# Patient Record
Sex: MALE | Race: WHITE | NOT HISPANIC OR LATINO | Employment: FULL TIME | RURAL
[De-identification: names, ages, dates, MRNs, and addresses within clinical notes are randomized per-mention and may not be internally consistent; named-entity substitution may affect disease eponyms.]

---

## 2017-06-09 RX ORDER — LISINOPRIL 40 MG/1
TABLET ORAL
Qty: 90 TABLET | Refills: 1 | Status: SHIPPED | OUTPATIENT
Start: 2017-06-09 | End: 2017-09-05 | Stop reason: SDUPTHER

## 2017-07-11 ENCOUNTER — OFFICE VISIT (OUTPATIENT)
Dept: FAMILY MEDICINE CLINIC | Facility: CLINIC | Age: 57
End: 2017-07-11

## 2017-07-11 VITALS
HEIGHT: 72 IN | RESPIRATION RATE: 16 BRPM | WEIGHT: 245 LBS | OXYGEN SATURATION: 97 % | SYSTOLIC BLOOD PRESSURE: 132 MMHG | HEART RATE: 72 BPM | BODY MASS INDEX: 33.18 KG/M2 | DIASTOLIC BLOOD PRESSURE: 86 MMHG

## 2017-07-11 DIAGNOSIS — Z00.00 WELLNESS EXAMINATION: Primary | ICD-10-CM

## 2017-07-11 PROCEDURE — 99396 PREV VISIT EST AGE 40-64: CPT | Performed by: FAMILY MEDICINE

## 2017-07-11 NOTE — PROGRESS NOTES
"Jade Bradley is a 57 y.o. male.     Chief Complaint   Patient presents with   • Annual Exam     physical for [employer]       History of Present Illness     see form--deniesn any cp, head, syncope or hypoglycemia      Current Outpatient Prescriptions:   •  lansoprazole (PREVACID) 30 MG capsule, Take 1 capsule by mouth Daily., Disp: 90 capsule, Rfl: 1  •  lisinopril (PRINIVIL,ZESTRIL) 40 MG tablet, TAKE 1 TABLET DAILY, Disp: 90 tablet, Rfl: 1  •  metFORMIN (GLUCOPHAGE) 500 MG tablet, Take 1 tablet by mouth 2 (Two) Times a Day With Meals. Depends on blood sugar readings, Disp: 180 tablet, Rfl: 1  •  ONE TOUCH ULTRA TEST test strip, 1 each by Other route 2 (Two) Times a Day. Use as instructed, Disp: 3 each, Rfl: 2  •  ONETOUCH DELICA LANCETS FINE misc, TEST TWICE DAILY, Disp: , Rfl: 5  Allergies   Allergen Reactions   • Penicillins        Past Medical History:   Diagnosis Date   • Diabetes mellitus    • Diverticula of colon    • GERD (gastroesophageal reflux disease)    • Hypertension      Past Surgical History:   Procedure Laterality Date   • COLONOSCOPY  11/01/2010    snare 70, 60, rectum tics recall 5 years   • COLONOSCOPY Left 11/3/2016    Procedure: COLONOSCOPY WITH ANESTHESIA;  Surgeon: Lisandro Holt MD;  Location: [facility] ENDOSCOPY;  Service:    • HEMORRHOIDECTOMY     • HERNIA REPAIR     • TONSILLECTOMY         Review of Systems   Constitutional: Negative.    HENT: Negative.    Eyes: Negative.    Respiratory: Negative.    Cardiovascular: Negative.    Gastrointestinal: Negative.    Endocrine: Negative.    Genitourinary: Negative.    Musculoskeletal: Negative.    Skin: Negative.    Allergic/Immunologic: Negative.    Neurological: Negative.    Hematological: Negative.    Psychiatric/Behavioral: Negative.        Objective  /86  Pulse 72  Resp 16  Ht 72\" (182.9 cm)  Wt 245 lb (111 kg)  SpO2 97%  BMI 33.23 kg/m2  Physical Exam   Constitutional: He is oriented to person, place, and time. " He appears well-developed and well-nourished.   HENT:   Head: Normocephalic and atraumatic.   Right Ear: External ear normal.   Left Ear: External ear normal.   Nose: Nose normal.   Mouth/Throat: Oropharynx is clear and moist.   Eyes: Conjunctivae and EOM are normal. Pupils are equal, round, and reactive to light.   Neck: Normal range of motion. Neck supple.   Cardiovascular: Normal rate, regular rhythm, normal heart sounds and intact distal pulses.    Pulmonary/Chest: Effort normal and breath sounds normal.   Abdominal: Soft. Bowel sounds are normal.   Musculoskeletal: Normal range of motion.   Neurological: He is alert and oriented to person, place, and time. He has normal reflexes.   Skin: Skin is warm and dry.   Psychiatric: He has a normal mood and affect. His behavior is normal. Judgment and thought content normal.   Nursing note and vitals reviewed.      Assessment/Plan   Ashley was seen today for annual exam.    Diagnoses and all orders for this visit:    Wellness examination    ([employer] physical)  See form             No orders of the defined types were placed in this encounter.      Follow up: next appt

## 2017-09-05 RX ORDER — LISINOPRIL 40 MG/1
40 TABLET ORAL DAILY
Qty: 90 TABLET | Refills: 1 | Status: SHIPPED | OUTPATIENT
Start: 2017-09-05 | End: 2018-02-06 | Stop reason: SDUPTHER

## 2017-09-29 ENCOUNTER — LAB (OUTPATIENT)
Dept: FAMILY MEDICINE CLINIC | Facility: CLINIC | Age: 57
End: 2017-09-29

## 2017-09-29 ENCOUNTER — DOCUMENTATION (OUTPATIENT)
Dept: FAMILY MEDICINE CLINIC | Facility: CLINIC | Age: 57
End: 2017-09-29

## 2017-09-29 DIAGNOSIS — E13.9 DIABETES MELLITUS OF OTHER TYPE WITHOUT COMPLICATION: Primary | ICD-10-CM

## 2017-09-29 LAB — HBA1C MFR BLD: 6.2 %

## 2018-02-06 ENCOUNTER — OFFICE VISIT (OUTPATIENT)
Dept: FAMILY MEDICINE CLINIC | Facility: CLINIC | Age: 58
End: 2018-02-06

## 2018-02-06 VITALS
HEIGHT: 72 IN | WEIGHT: 243 LBS | SYSTOLIC BLOOD PRESSURE: 142 MMHG | TEMPERATURE: 98.5 F | RESPIRATION RATE: 16 BRPM | OXYGEN SATURATION: 95 % | HEART RATE: 101 BPM | BODY MASS INDEX: 32.91 KG/M2 | DIASTOLIC BLOOD PRESSURE: 78 MMHG

## 2018-02-06 DIAGNOSIS — G89.29 CHRONIC MIDLINE LOW BACK PAIN WITHOUT SCIATICA: ICD-10-CM

## 2018-02-06 DIAGNOSIS — M54.50 CHRONIC MIDLINE LOW BACK PAIN WITHOUT SCIATICA: ICD-10-CM

## 2018-02-06 DIAGNOSIS — E11.9 DM TYPE 2, GOAL A1C TO BE DETERMINED (HCC): ICD-10-CM

## 2018-02-06 DIAGNOSIS — L85.3 DRY SKIN DERMATITIS: ICD-10-CM

## 2018-02-06 DIAGNOSIS — I10 ESSENTIAL HYPERTENSION: Primary | ICD-10-CM

## 2018-02-06 DIAGNOSIS — Z12.5 SCREENING FOR PROSTATE CANCER: ICD-10-CM

## 2018-02-06 PROCEDURE — 99214 OFFICE O/P EST MOD 30 MIN: CPT | Performed by: FAMILY MEDICINE

## 2018-02-06 RX ORDER — LISINOPRIL 40 MG/1
40 TABLET ORAL DAILY
Qty: 90 TABLET | Refills: 1 | Status: SHIPPED | OUTPATIENT
Start: 2018-02-06 | End: 2018-07-31 | Stop reason: SDUPTHER

## 2018-02-06 NOTE — PROGRESS NOTES
"Subjective   Ashley Bradley is a 57 y.o. male.     Chief Complaint   Patient presents with   • Follow-up     back pain/leg rash        History of Present Illness     notes having pain in his lower back  and fingers--he occ monitors his bs--his bp remains stable ..occ rash on left leg      Current Outpatient Prescriptions:   •  fluocinonide-emollient (LIDEX-E) 0.05 % cream, Apply  topically Daily. Use up to  7days, Disp: 30 g, Rfl: 0  •  lisinopril (PRINIVIL,ZESTRIL) 40 MG tablet, Take 1 tablet by mouth Daily., Disp: 90 tablet, Rfl: 1  •  metFORMIN (GLUCOPHAGE) 500 MG tablet, Take 1 tablet by mouth 2 (Two) Times a Day With Meals. Depends on blood sugar readings, Disp: 180 tablet, Rfl: 1  •  ONE TOUCH ULTRA TEST test strip, Test 2 times daily as directed, Disp: 3 each, Rfl: 2  •  ONETOUCH DELICA LANCETS FINE misc, TEST TWICE DAILY, Disp: , Rfl: 5  Allergies   Allergen Reactions   • Penicillins        Past Medical History:   Diagnosis Date   • Diabetes mellitus    • Diverticula of colon    • GERD (gastroesophageal reflux disease)    • Hypertension      Past Surgical History:   Procedure Laterality Date   • COLONOSCOPY  11/01/2010    snare 70, 60, rectum tics recall 5 years   • COLONOSCOPY Left 11/3/2016    Procedure: COLONOSCOPY WITH ANESTHESIA;  Surgeon: Lisandro Holt MD;  Location: [facility] ENDOSCOPY;  Service:    • HEMORRHOIDECTOMY     • HERNIA REPAIR     • TONSILLECTOMY         Review of Systems   Constitutional: Negative.    HENT: Negative.    Eyes: Negative.    Respiratory: Negative.    Cardiovascular: Negative.    Gastrointestinal: Negative.    Endocrine: Negative.    Genitourinary: Negative.    Musculoskeletal: Positive for back pain.   Skin: Positive for rash.   Allergic/Immunologic: Negative.    Neurological: Negative.    Hematological: Negative.    Psychiatric/Behavioral: Negative.        Objective  /78  Pulse 101  Temp 98.5 °F (36.9 °C)  Resp 16  Ht 182.9 cm (72\")  Wt 110 kg (243 lb)  SpO2 95% "  BMI 32.96 kg/m2  Physical Exam   Constitutional: He is oriented to person, place, and time. He appears well-developed and well-nourished.   HENT:   Head: Normocephalic and atraumatic.   Right Ear: External ear normal.   Left Ear: External ear normal.   Nose: Nose normal.   Mouth/Throat: Oropharynx is clear and moist.   Eyes: Conjunctivae and EOM are normal. Pupils are equal, round, and reactive to light.   Neck: Normal range of motion. Neck supple.   Cardiovascular: Normal rate, regular rhythm, normal heart sounds and intact distal pulses.    Pulmonary/Chest: Effort normal and breath sounds normal.   Abdominal: Soft. Bowel sounds are normal.   Musculoskeletal: Normal range of motion.    Ashley had a diabetic foot exam performed today.   During the foot exam he had a monofilament test not performed.    Neurological Sensory Findings - Unaltered hot/cold right ankle/foot discrimination and unaltered hot/cold left ankle/foot discrimination. Unaltered sharp/dull right ankle/foot discrimination and unaltered sharp/dull left ankle/foot discrimination. No right ankle/foot altered proprioception and no left ankle/foot altered proprioception    Vascular Status -  His exam exhibits right foot vasculature normal. His exam exhibits no right foot edema. His exam exhibits left foot vasculature normal. His exam exhibits no left foot edema.   Skin Integrity  -  His right foot skin is intact.     Ashley 's left foot skin is intact. .  Neurological: He is alert and oriented to person, place, and time. He has normal reflexes.   Skin: Skin is warm and dry. Rash noted.   Psychiatric: He has a normal mood and affect. His behavior is normal. Judgment and thought content normal.   Nursing note and vitals reviewed.      Assessment/Plan   Ashley was seen today for follow-up.    Diagnoses and all orders for this visit:    Essential hypertension  -     CBC w AUTO Differential  -     Comprehensive Metabolic Panel  -     Lipid Panel    DM type  2, goal A1C to be determined  -     Hemoglobin A1c  -     MicroAlbumin, Urine, Random - Urine, Clean Catch    Chronic midline low back pain without sciatica  -     Ambulatory Referral to Rheumatology    Dry skin dermatitis    Screening for prostate cancer  -     PSA Screen    Other orders  -     metFORMIN (GLUCOPHAGE) 500 MG tablet; Take 1 tablet by mouth 2 (Two) Times a Day With Meals. Depends on blood sugar readings  -     lisinopril (PRINIVIL,ZESTRIL) 40 MG tablet; Take 1 tablet by mouth Daily.  -     fluocinonide-emollient (LIDEX-E) 0.05 % cream; Apply  topically Daily. Use up to  7days                 Orders Placed This Encounter   Procedures   • Comprehensive Metabolic Panel   • Lipid Panel   • Hemoglobin A1c   • MicroAlbumin, Urine, Random - Urine, Clean Catch   • PSA Screen   • Ambulatory Referral to Rheumatology     Referral Priority:   Routine     Referral Type:   Consultation     Referral Reason:   Specialty Services Required     Referred to Provider:   Arnav Rodas MD     Requested Specialty:   Rheumatology     Number of Visits Requested:   1   • CBC w AUTO Differential     Order Specific Question:   Manual Differential     Answer:   No       Follow up: 6 month(s)

## 2018-02-07 LAB
ALBUMIN SERPL-MCNC: 4.7 G/DL (ref 3.5–5)
ALBUMIN/GLOB SERPL: 1.7 G/DL (ref 1.1–2.5)
ALP SERPL-CCNC: 144 U/L (ref 24–120)
ALT SERPL-CCNC: 79 U/L (ref 0–54)
AST SERPL-CCNC: 42 U/L (ref 7–45)
BASOPHILS # BLD AUTO: 0.17 10*3/MM3 (ref 0–0.2)
BASOPHILS NFR BLD AUTO: 1.4 % (ref 0–2)
BILIRUB SERPL-MCNC: 0.6 MG/DL (ref 0.1–1)
BUN SERPL-MCNC: 12 MG/DL (ref 5–21)
BUN/CREAT SERPL: 14.1 (ref 7–25)
CALCIUM SERPL-MCNC: 10.6 MG/DL (ref 8.4–10.4)
CHLORIDE SERPL-SCNC: 95 MMOL/L (ref 98–110)
CHOLEST SERPL-MCNC: 195 MG/DL (ref 130–200)
CO2 SERPL-SCNC: 26 MMOL/L (ref 24–31)
CREAT SERPL-MCNC: 0.85 MG/DL (ref 0.5–1.4)
EOSINOPHIL # BLD AUTO: 0.2 10*3/MM3 (ref 0–0.7)
EOSINOPHIL NFR BLD AUTO: 1.6 % (ref 0–4)
ERYTHROCYTE [DISTWIDTH] IN BLOOD BY AUTOMATED COUNT: 12 % (ref 12–15)
GFR SERPLBLD CREATININE-BSD FMLA CKD-EPI: 112 ML/MIN/1.73
GFR SERPLBLD CREATININE-BSD FMLA CKD-EPI: 93 ML/MIN/1.73
GLOBULIN SER CALC-MCNC: 2.8 GM/DL
GLUCOSE SERPL-MCNC: 216 MG/DL (ref 70–100)
HBA1C MFR BLD: 7.9 %
HCT VFR BLD AUTO: 51.6 % (ref 40–52)
HDLC SERPL-MCNC: 34 MG/DL
HGB BLD-MCNC: 16.9 G/DL (ref 14–18)
IMM GRANULOCYTES # BLD: 0.13 10*3/MM3 (ref 0–0.03)
IMM GRANULOCYTES NFR BLD: 1 % (ref 0–5)
LDLC SERPL CALC-MCNC: 110 MG/DL (ref 0–99)
LYMPHOCYTES # BLD AUTO: 3.53 10*3/MM3 (ref 0.72–4.86)
LYMPHOCYTES NFR BLD AUTO: 28.3 % (ref 15–45)
MCH RBC QN AUTO: 31.5 PG (ref 28–32)
MCHC RBC AUTO-ENTMCNC: 32.8 G/DL (ref 33–36)
MCV RBC AUTO: 96.3 FL (ref 82–95)
MICROALBUMIN UR-MCNC: 165.1 UG/ML
MONOCYTES # BLD AUTO: 0.89 10*3/MM3 (ref 0.19–1.3)
MONOCYTES NFR BLD AUTO: 7.1 % (ref 4–12)
NEUTROPHILS # BLD AUTO: 7.55 10*3/MM3 (ref 1.87–8.4)
NEUTROPHILS NFR BLD AUTO: 60.6 % (ref 39–78)
NRBC BLD AUTO-RTO: 0 /100 WBC (ref 0–0)
PLATELET # BLD AUTO: 299 10*3/MM3 (ref 130–400)
POTASSIUM SERPL-SCNC: 5.1 MMOL/L (ref 3.5–5.3)
PROT SERPL-MCNC: 7.5 G/DL (ref 6.3–8.7)
PSA SERPL-MCNC: 1.7 NG/ML (ref 0–4)
RBC # BLD AUTO: 5.36 10*6/MM3 (ref 4.8–5.9)
SODIUM SERPL-SCNC: 137 MMOL/L (ref 135–145)
TRIGL SERPL-MCNC: 255 MG/DL (ref 0–149)
VLDLC SERPL CALC-MCNC: 51 MG/DL
WBC # BLD AUTO: 12.47 10*3/MM3 (ref 4.8–10.8)

## 2018-02-13 ENCOUNTER — OFFICE VISIT (OUTPATIENT)
Dept: FAMILY MEDICINE CLINIC | Facility: CLINIC | Age: 58
End: 2018-02-13

## 2018-02-13 VITALS
OXYGEN SATURATION: 97 % | HEIGHT: 72 IN | WEIGHT: 243 LBS | RESPIRATION RATE: 16 BRPM | HEART RATE: 90 BPM | SYSTOLIC BLOOD PRESSURE: 124 MMHG | DIASTOLIC BLOOD PRESSURE: 82 MMHG | BODY MASS INDEX: 32.91 KG/M2

## 2018-02-13 DIAGNOSIS — E11.9 TYPE 2 DIABETES MELLITUS WITHOUT COMPLICATION, WITHOUT LONG-TERM CURRENT USE OF INSULIN (HCC): Primary | ICD-10-CM

## 2018-02-13 PROCEDURE — 99213 OFFICE O/P EST LOW 20 MIN: CPT | Performed by: FAMILY MEDICINE

## 2018-02-13 RX ORDER — METFORMIN HYDROCHLORIDE EXTENDED-RELEASE TABLETS 1000 MG/1
1000 TABLET, FILM COATED, EXTENDED RELEASE ORAL
Qty: 30 TABLET | Refills: 2 | Status: SHIPPED | OUTPATIENT
Start: 2018-02-13 | End: 2018-04-18 | Stop reason: SDUPTHER

## 2018-02-13 NOTE — PROGRESS NOTES
"Subjective   Ashley Bradley is a 57 y.o. male.     Chief Complaint   Patient presents with   • Follow-up     labs        History of Present Illness     he reveals he has been out of metfromin for about a month--we note his recent a1c...denies any hypoglycemia      Current Outpatient Prescriptions:   •  fluocinonide-emollient (LIDEX-E) 0.05 % cream, Apply  topically Daily. Use up to  7days, Disp: 30 g, Rfl: 0  •  lisinopril (PRINIVIL,ZESTRIL) 40 MG tablet, Take 1 tablet by mouth Daily., Disp: 90 tablet, Rfl: 1  •  metFORMIN (FORTAMET) 1000 MG (OSM) 24 hr tablet, Take 1 tablet by mouth Daily With Breakfast., Disp: 30 tablet, Rfl: 2  •  metFORMIN (GLUCOPHAGE) 500 MG tablet, Take 1 tablet by mouth 2 (Two) Times a Day With Meals. Depends on blood sugar readings, Disp: 180 tablet, Rfl: 1  •  ONE TOUCH ULTRA TEST test strip, Test 2 times daily as directed, Disp: 3 each, Rfl: 2  •  ONETOUCH DELICA LANCETS FINE misc, TEST TWICE DAILY, Disp: , Rfl: 5  Allergies   Allergen Reactions   • Penicillins        Past Medical History:   Diagnosis Date   • Diabetes mellitus    • Diverticula of colon    • GERD (gastroesophageal reflux disease)    • Hypertension      Past Surgical History:   Procedure Laterality Date   • COLONOSCOPY  11/01/2010    snare 70, 60, rectum tics recall 5 years   • COLONOSCOPY Left 11/3/2016    Procedure: COLONOSCOPY WITH ANESTHESIA;  Surgeon: Lisandro Holt MD;  Location: [facility] ENDOSCOPY;  Service:    • HEMORRHOIDECTOMY     • HERNIA REPAIR     • TONSILLECTOMY         Review of Systems   Constitutional: Negative.    HENT: Negative.    Eyes: Negative.    Respiratory: Negative.    Cardiovascular: Negative.    Gastrointestinal: Negative.    Endocrine: Negative.        Objective  /82  Pulse 90  Resp 16  Ht 182.9 cm (72\")  Wt 110 kg (243 lb)  SpO2 97%  BMI 32.96 kg/m2  Physical Exam   Constitutional: He appears well-developed and well-nourished.   HENT:   Head: Normocephalic.   Eyes: Pupils are " equal, round, and reactive to light.   Neck: Normal range of motion.   Cardiovascular: Normal rate, regular rhythm, normal heart sounds and intact distal pulses.    Pulmonary/Chest: Effort normal and breath sounds normal.   Abdominal: Soft. Bowel sounds are normal.   Musculoskeletal: Normal range of motion.   Neurological: He is alert. He has normal reflexes.   Skin: Skin is warm.   Psychiatric: He has a normal mood and affect. His behavior is normal. Judgment and thought content normal.   Nursing note reviewed.      Assessment/Plan   Ashley was seen today for follow-up.    Diagnoses and all orders for this visit:    Type 2 diabetes mellitus without complication, without long-term current use of insulin    Other orders  -     metFORMIN (FORTAMET) 1000 MG (OSM) 24 hr tablet; Take 1 tablet by mouth Daily With Breakfast.                 No orders of the defined types were placed in this encounter.      Follow up: 3 month(s)

## 2018-02-19 ENCOUNTER — TELEPHONE (OUTPATIENT)
Dept: FAMILY MEDICINE CLINIC | Facility: CLINIC | Age: 58
End: 2018-02-19

## 2018-02-19 DIAGNOSIS — M54.50 CHRONIC MIDLINE LOW BACK PAIN WITHOUT SCIATICA: Primary | ICD-10-CM

## 2018-02-19 DIAGNOSIS — G89.29 CHRONIC MIDLINE LOW BACK PAIN WITHOUT SCIATICA: Primary | ICD-10-CM

## 2018-02-19 NOTE — TELEPHONE ENCOUNTER
Calling about a referral that was made to Dr. Rodas. She says Dr. Rodas does not see patients with low back pain unless they have some concern for inflammatory issues.

## 2018-02-19 NOTE — TELEPHONE ENCOUNTER
Pt informed Dr. Rodas does not see Pts with just low back pain. Mr Bradley says he doesn't know what to do from here. Still has the pain. Wants to know what can be done?

## 2018-02-19 NOTE — TELEPHONE ENCOUNTER
He is agreeable to see a back specialist. He is leaving Thursday on the boat.Will be gone 21 days.

## 2018-04-18 RX ORDER — METFORMIN HYDROCHLORIDE EXTENDED-RELEASE TABLETS 1000 MG/1
1000 TABLET, FILM COATED, EXTENDED RELEASE ORAL
Qty: 90 TABLET | Refills: 3 | Status: SHIPPED | OUTPATIENT
Start: 2018-04-18 | End: 2018-12-10 | Stop reason: CLARIF

## 2018-05-14 ENCOUNTER — OFFICE VISIT (OUTPATIENT)
Dept: FAMILY MEDICINE CLINIC | Facility: CLINIC | Age: 58
End: 2018-05-14

## 2018-05-14 VITALS
HEIGHT: 72 IN | WEIGHT: 239 LBS | RESPIRATION RATE: 16 BRPM | OXYGEN SATURATION: 93 % | HEART RATE: 92 BPM | SYSTOLIC BLOOD PRESSURE: 118 MMHG | DIASTOLIC BLOOD PRESSURE: 76 MMHG | BODY MASS INDEX: 32.37 KG/M2

## 2018-05-14 DIAGNOSIS — E11.9 TYPE 2 DIABETES MELLITUS WITHOUT COMPLICATION, WITHOUT LONG-TERM CURRENT USE OF INSULIN (HCC): ICD-10-CM

## 2018-05-14 DIAGNOSIS — L24.7 IRRITANT CONTACT DERMATITIS DUE TO PLANTS, EXCEPT FOOD: ICD-10-CM

## 2018-05-14 DIAGNOSIS — N18.2 CHRONIC KIDNEY DISEASE, STAGE II (MILD): ICD-10-CM

## 2018-05-14 DIAGNOSIS — E78.2 MIXED HYPERLIPIDEMIA: ICD-10-CM

## 2018-05-14 DIAGNOSIS — I10 ESSENTIAL HYPERTENSION: Primary | ICD-10-CM

## 2018-05-14 PROCEDURE — 99214 OFFICE O/P EST MOD 30 MIN: CPT | Performed by: FAMILY MEDICINE

## 2018-05-14 RX ORDER — PREDNISONE 10 MG/1
TABLET ORAL
Qty: 15 TABLET | Refills: 0 | Status: SHIPPED | OUTPATIENT
Start: 2018-05-14 | End: 2019-04-09

## 2018-05-14 RX ORDER — DICLOFENAC SODIUM 75 MG/1
TABLET, DELAYED RELEASE ORAL
COMMUNITY
Start: 2018-05-01 | End: 2019-04-09 | Stop reason: SDUPTHER

## 2018-05-14 NOTE — PROGRESS NOTES
Subjective   Ashley Bradley is a 58 y.o. male.     Chief Complaint   Patient presents with   • Follow-up     3 mo    type 2 DM   • Rash     on arms        History of Present Illness     Ashley is here for check on his medical problelms--he thinks his bs are issa cheema and bp has been stable --his is using nsaids for his back and he insists dr shields said it was ok to continue this depsite ckd --he also has been outsie and [place] and developed purutic rash aroudn the arms      Current Outpatient Prescriptions:   •  diclofenac (VOLTAREN) 75 MG EC tablet, , Disp: , Rfl:   •  lisinopril (PRINIVIL,ZESTRIL) 40 MG tablet, Take 1 tablet by mouth Daily., Disp: 90 tablet, Rfl: 1  •  metFORMIN (FORTAMET) 1000 MG (OSM) 24 hr tablet, Take 1 tablet by mouth Daily With Breakfast., Disp: 90 tablet, Rfl: 3  •  ONE TOUCH ULTRA TEST test strip, Test 2 times daily as directed, Disp: 3 each, Rfl: 2  •  ONETOUCH DELICA LANCETS FINE misc, TEST TWICE DAILY, Disp: , Rfl: 5  •  fluocinonide-emollient (LIDEX-E) 0.05 % cream, Apply  topically 3 (Three) Times a Day., Disp: 30 g, Rfl: 0  •  predniSONE (DELTASONE) 10 MG tablet, 30mg x 3 days then 20mg x 2 days then 10mg x 2 days, Disp: 15 tablet, Rfl: 0  Allergies   Allergen Reactions   • Penicillins        Past Medical History:   Diagnosis Date   • Diabetes mellitus    • Diverticula of colon    • GERD (gastroesophageal reflux disease)    • Hypertension      Past Surgical History:   Procedure Laterality Date   • COLONOSCOPY  11/01/2010    snare 70, 60, rectum tics recall 5 years   • COLONOSCOPY Left 11/3/2016    Procedure: COLONOSCOPY WITH ANESTHESIA;  Surgeon: Lisandro Holt MD;  Location: [facility] ENDOSCOPY;  Service:    • HEMORRHOIDECTOMY     • HERNIA REPAIR     • TONSILLECTOMY         Review of Systems   Constitutional: Negative.    HENT: Negative.    Eyes: Negative.    Respiratory: Negative.    Cardiovascular: Negative.    Gastrointestinal: Negative.    Endocrine: Negative.   "  Genitourinary: Negative.    Musculoskeletal: Negative.    Skin: Positive for rash.   Allergic/Immunologic: Negative.    Neurological: Negative.    Hematological: Negative.    Psychiatric/Behavioral: Negative.        Objective  /76   Pulse 92   Resp 16   Ht 182.9 cm (72\")   Wt 108 kg (239 lb)   SpO2 93%   BMI 32.41 kg/m²   Physical Exam   Constitutional: He is oriented to person, place, and time. He appears well-developed and well-nourished.   HENT:   Head: Normocephalic and atraumatic.   Right Ear: External ear normal.   Left Ear: External ear normal.   Nose: Nose normal.   Mouth/Throat: Oropharynx is clear and moist.   Eyes: Conjunctivae and EOM are normal. Pupils are equal, round, and reactive to light.   Neck: Normal range of motion. Neck supple.   Cardiovascular: Normal rate, regular rhythm, normal heart sounds and intact distal pulses.    Pulmonary/Chest: Effort normal and breath sounds normal.   Abdominal: Soft. Bowel sounds are normal.   Musculoskeletal: Normal range of motion.   Neurological: He is alert and oriented to person, place, and time. He has normal reflexes.   Skin: Skin is warm. Capillary refill takes less than 2 seconds. Rash noted.   Psychiatric: He has a normal mood and affect. His behavior is normal. Judgment and thought content normal.   Nursing note and vitals reviewed.      Assessment/Plan   Ashley was seen today for follow-up and rash.    Diagnoses and all orders for this visit:    Essential hypertension    Mixed hyperlipidemia    Type 2 diabetes mellitus without complication, without long-term current use of insulin  -     Basic metabolic panel  -     Hemoglobin A1c    Chronic kidney disease, stage II (mild)    Irritant contact dermatitis due to plants, except food    Other orders  -     predniSONE (DELTASONE) 10 MG tablet; 30mg x 3 days then 20mg x 2 days then 10mg x 2 days  -     fluocinonide-emollient (LIDEX-E) 0.05 % cream; Apply  topically 3 (Three) Times a " Day.                 Orders Placed This Encounter   Procedures   • Basic metabolic panel   • Hemoglobin A1c       Follow up: 4 month(s)

## 2018-05-15 LAB
BUN SERPL-MCNC: 18 MG/DL (ref 5–21)
BUN/CREAT SERPL: 19.8 (ref 7–25)
CALCIUM SERPL-MCNC: 10 MG/DL (ref 8.4–10.4)
CHLORIDE SERPL-SCNC: 100 MMOL/L (ref 98–110)
CO2 SERPL-SCNC: 27 MMOL/L (ref 24–31)
CREAT SERPL-MCNC: 0.91 MG/DL (ref 0.5–1.4)
GFR SERPLBLD CREATININE-BSD FMLA CKD-EPI: 103 ML/MIN/1.73
GFR SERPLBLD CREATININE-BSD FMLA CKD-EPI: 85 ML/MIN/1.73
GLUCOSE SERPL-MCNC: 92 MG/DL (ref 70–100)
HBA1C MFR BLD: 7.4 %
POTASSIUM SERPL-SCNC: 4.5 MMOL/L (ref 3.5–5.3)
SODIUM SERPL-SCNC: 140 MMOL/L (ref 135–145)

## 2018-06-19 ENCOUNTER — TELEPHONE (OUTPATIENT)
Dept: FAMILY MEDICINE CLINIC | Facility: CLINIC | Age: 58
End: 2018-06-19

## 2018-06-19 NOTE — TELEPHONE ENCOUNTER
Pt called he went to [facility] and he said that he looks like a lobster he asked if u will call in silvadene cream [facility] [phone]

## 2018-07-31 RX ORDER — LISINOPRIL 40 MG/1
40 TABLET ORAL DAILY
Qty: 90 TABLET | Refills: 1 | Status: SHIPPED | OUTPATIENT
Start: 2018-07-31 | End: 2019-01-28 | Stop reason: SDUPTHER

## 2018-07-31 RX ORDER — LISINOPRIL 40 MG/1
40 TABLET ORAL DAILY
Qty: 90 TABLET | Refills: 1 | Status: SHIPPED | OUTPATIENT
Start: 2018-07-31 | End: 2018-07-31 | Stop reason: SDUPTHER

## 2018-12-10 ENCOUNTER — TELEPHONE (OUTPATIENT)
Dept: FAMILY MEDICINE CLINIC | Facility: CLINIC | Age: 58
End: 2018-12-10

## 2018-12-10 RX ORDER — METFORMIN HYDROCHLORIDE 500 MG/1
TABLET, EXTENDED RELEASE ORAL
Qty: 180 TABLET | Refills: 1 | Status: SHIPPED | OUTPATIENT
Start: 2018-12-10 | End: 2019-04-09 | Stop reason: SDUPTHER

## 2018-12-10 NOTE — TELEPHONE ENCOUNTER
Needs his Metformin switched to glucophage for insurance. He is presently taking Metformin 1000mg extended release. Glucophage ER only comes 500 or750. Can he switch to the regular release or he will have to take 2 tabs of the 500mg ER

## 2018-12-18 ENCOUNTER — TRANSCRIBE ORDERS (OUTPATIENT)
Dept: ADMINISTRATIVE | Facility: HOSPITAL | Age: 58
End: 2018-12-18

## 2018-12-18 DIAGNOSIS — M54.10 RADICULITIS: Primary | ICD-10-CM

## 2018-12-19 ENCOUNTER — HOSPITAL ENCOUNTER (OUTPATIENT)
Dept: MRI IMAGING | Facility: HOSPITAL | Age: 58
Discharge: HOME | End: 2018-12-19
Admitting: NURSE PRACTITIONER

## 2018-12-19 DIAGNOSIS — M54.10 RADICULITIS: ICD-10-CM

## 2018-12-19 PROCEDURE — 72148 MRI LUMBAR SPINE W/O DYE: CPT

## 2019-01-28 RX ORDER — LISINOPRIL 40 MG/1
40 TABLET ORAL DAILY
Qty: 30 TABLET | Refills: 0 | Status: SHIPPED | OUTPATIENT
Start: 2019-01-28 | End: 2019-04-04

## 2019-03-15 ENCOUNTER — TELEPHONE (OUTPATIENT)
Dept: FAMILY MEDICINE CLINIC | Facility: CLINIC | Age: 59
End: 2019-03-15

## 2019-03-15 NOTE — TELEPHONE ENCOUNTER
margaret I got a message from vidya rn pharm md since pt is over 40 yrs old and a diabetic it is recommended that he be on a statin drug and she stated that he has dx of hyperlipidemia. [phone] ref # [id number]

## 2019-04-04 ENCOUNTER — TELEPHONE (OUTPATIENT)
Dept: FAMILY MEDICINE CLINIC | Facility: CLINIC | Age: 59
End: 2019-04-04

## 2019-04-04 RX ORDER — LISINOPRIL 40 MG/1
40 TABLET ORAL DAILY
Qty: 90 TABLET | Refills: 0 | Status: SHIPPED | OUTPATIENT
Start: 2019-04-04 | End: 2019-04-09 | Stop reason: SDUPTHER

## 2019-04-04 NOTE — TELEPHONE ENCOUNTER
I spoke to the patient regarding his lisinopril refill.  He said that his insurance would only fill/pay for 90 days at a time.  I informed him that he had not been seen by Dr. Rankin since 5/2018 and he would need to have an office visit to get further refills.  He made an appt for 4-9-19 at 1615 and knows that if he does not keep this appt he will not get further refills.

## 2019-04-09 ENCOUNTER — OFFICE VISIT (OUTPATIENT)
Dept: FAMILY MEDICINE CLINIC | Facility: CLINIC | Age: 59
End: 2019-04-09

## 2019-04-09 VITALS
WEIGHT: 239 LBS | HEART RATE: 85 BPM | BODY MASS INDEX: 32.37 KG/M2 | DIASTOLIC BLOOD PRESSURE: 86 MMHG | OXYGEN SATURATION: 96 % | HEIGHT: 72 IN | SYSTOLIC BLOOD PRESSURE: 128 MMHG | RESPIRATION RATE: 16 BRPM

## 2019-04-09 DIAGNOSIS — E11.9 TYPE 2 DIABETES MELLITUS WITHOUT COMPLICATION, WITHOUT LONG-TERM CURRENT USE OF INSULIN (HCC): ICD-10-CM

## 2019-04-09 DIAGNOSIS — I10 ESSENTIAL HYPERTENSION: Primary | ICD-10-CM

## 2019-04-09 PROCEDURE — 99213 OFFICE O/P EST LOW 20 MIN: CPT | Performed by: FAMILY MEDICINE

## 2019-04-09 RX ORDER — METFORMIN HYDROCHLORIDE 500 MG/1
TABLET, EXTENDED RELEASE ORAL
Qty: 180 TABLET | Refills: 3 | Status: SHIPPED | OUTPATIENT
Start: 2019-04-09 | End: 2020-05-01

## 2019-04-09 RX ORDER — DICLOFENAC SODIUM 75 MG/1
75 TABLET, DELAYED RELEASE ORAL 2 TIMES DAILY
Qty: 180 TABLET | Refills: 3 | Status: SHIPPED | OUTPATIENT
Start: 2019-04-09 | End: 2020-05-01

## 2019-04-09 RX ORDER — LISINOPRIL 40 MG/1
40 TABLET ORAL DAILY
Qty: 90 TABLET | Refills: 3 | Status: SHIPPED | OUTPATIENT
Start: 2019-04-09 | End: 2020-05-01

## 2019-04-09 NOTE — PROGRESS NOTES
"Subjective   Ashley Bradley is a 59 y.o. male.     Chief Complaint   Patient presents with   • Annual Exam     check up & med refill        History of Present Illness     he thinks his bs are stable --he notes his bp is stable wituout cp or ha      Current Outpatient Medications:   •  diclofenac (VOLTAREN) 75 MG EC tablet, , Disp: , Rfl:   •  lisinopril (PRINIVIL,ZESTRIL) 40 MG tablet, Take 1 tablet by mouth Daily., Disp: 90 tablet, Rfl: 0  •  metFORMIN ER (GLUCOPHAGE XR) 500 MG 24 hr tablet, Take 2 tablets with breakfast every day, Disp: 180 tablet, Rfl: 1  •  ONE TOUCH ULTRA TEST test strip, Test 2 times daily as directed, Disp: 3 each, Rfl: 2  •  ONETOUCH DELICA LANCETS FINE misc, TEST TWICE DAILY, Disp: , Rfl: 5  Allergies   Allergen Reactions   • Penicillins        Past Medical History:   Diagnosis Date   • Diabetes mellitus (CMS/HCC)    • Diverticula of colon    • GERD (gastroesophageal reflux disease)    • Hypertension      Past Surgical History:   Procedure Laterality Date   • COLONOSCOPY  11/01/2010    snare 70, 60, rectum tics recall 5 years   • COLONOSCOPY Left 11/3/2016    Procedure: COLONOSCOPY WITH ANESTHESIA;  Surgeon: Lisandro Holt MD;  Location: [facility] ENDOSCOPY;  Service:    • HEMORRHOIDECTOMY     • HERNIA REPAIR     • TONSILLECTOMY         Review of Systems   Constitutional: Negative.    HENT: Negative.    Eyes: Negative.    Respiratory: Negative.    Cardiovascular: Negative.    Gastrointestinal: Negative.    Endocrine: Negative.    Genitourinary: Negative.    Musculoskeletal: Negative.    Skin: Negative.    Allergic/Immunologic: Negative.    Neurological: Negative.    Hematological: Negative.    Psychiatric/Behavioral: Negative.        Objective  /86   Pulse 85   Resp 16   Ht 182.9 cm (72\")   Wt 108 kg (239 lb)   SpO2 96%   BMI 32.41 kg/m²   Physical Exam   Constitutional: He is oriented to person, place, and time. He appears well-developed and well-nourished.   HENT:   Head: " Normocephalic and atraumatic.   Right Ear: External ear normal.   Left Ear: External ear normal.   Nose: Nose normal.   Mouth/Throat: Oropharynx is clear and moist.   Eyes: Conjunctivae and EOM are normal. Pupils are equal, round, and reactive to light.   Neck: Normal range of motion. Neck supple.   Cardiovascular: Normal rate, regular rhythm, normal heart sounds and intact distal pulses.   Pulmonary/Chest: Effort normal and breath sounds normal.   Abdominal: Soft. Bowel sounds are normal.   Musculoskeletal: Normal range of motion.    Ashley had a diabetic foot exam performed today.   During the foot exam he had a monofilament test performed.    Neurological Sensory Findings - Unaltered hot/cold right ankle/foot discrimination and unaltered hot/cold left ankle/foot discrimination. Unaltered sharp/dull right ankle/foot discrimination and unaltered sharp/dull left ankle/foot discrimination. No right ankle/foot altered proprioception and no left ankle/foot altered proprioception  Vascular Status -  His right foot exhibits abnormal foot vasculature . His right foot exhibits no edema. His left foot exhibits abnormal foot vasculature . His left foot exhibits no edema.  Skin Integrity  -  His right foot skin is not intact.  His left foot skin is not intact..  Neurological: He is alert and oriented to person, place, and time.   Skin: Skin is warm and dry. Capillary refill takes less than 2 seconds.   Psychiatric: He has a normal mood and affect. His behavior is normal. Judgment and thought content normal.   Nursing note and vitals reviewed.      Assessment/Plan   Ashley was seen today for annual exam.    Diagnoses and all orders for this visit:    Essential hypertension  -     PSA Screen  -     CBC w AUTO Differential  -     Comprehensive metabolic panel  -     Lipid Panel With / Chol / HDL Ratio  -     Hemoglobin A1c  -     MicroAlbumin, Urine, Random - Urine, Clean Catch    Type 2 diabetes mellitus without complication,  without long-term current use of insulin (CMS/HCC)      He says his colonoscopy is up to date           Orders Placed This Encounter   Procedures   • PSA Screen   • Comprehensive metabolic panel   • Lipid Panel With / Chol / HDL Ratio   • Hemoglobin A1c   • MicroAlbumin, Urine, Random - Urine, Clean Catch   • CBC w AUTO Differential     Order Specific Question:   Manual Differential     Answer:   No       Follow up: 6 month(s)

## 2019-04-10 LAB
ALBUMIN SERPL-MCNC: 4.2 G/DL (ref 3.5–5.2)
ALBUMIN/GLOB SERPL: 1.5 G/DL
ALP SERPL-CCNC: 103 U/L (ref 39–117)
ALT SERPL-CCNC: 48 U/L (ref 1–41)
AST SERPL-CCNC: 31 U/L (ref 1–40)
BASOPHILS # BLD AUTO: (no result) 10*3/UL
BASOPHILS # BLD MANUAL: 0.09 10*3/MM3 (ref 0–0.2)
BASOPHILS NFR BLD MANUAL: 1 % (ref 0–1.5)
BILIRUB SERPL-MCNC: 0.5 MG/DL (ref 0.2–1.2)
BUN SERPL-MCNC: 16 MG/DL (ref 8–23)
BUN/CREAT SERPL: 16.5 (ref 7–25)
CALCIUM SERPL-MCNC: 10 MG/DL (ref 8.6–10.5)
CHLORIDE SERPL-SCNC: 98 MMOL/L (ref 98–107)
CHOLEST SERPL-MCNC: 185 MG/DL (ref 0–200)
CHOLEST/HDLC SERPL: 6.61 {RATIO}
CO2 SERPL-SCNC: 24 MMOL/L (ref 22–29)
CREAT SERPL-MCNC: 0.97 MG/DL (ref 0.76–1.27)
DIFFERENTIAL COMMENT: NORMAL
EOSINOPHIL # BLD AUTO: (no result) 10*3/UL
EOSINOPHIL NFR BLD AUTO: (no result) %
ERYTHROCYTE [DISTWIDTH] IN BLOOD BY AUTOMATED COUNT: 11.9 % (ref 12.3–15.4)
GLOBULIN SER CALC-MCNC: 2.8 GM/DL
GLUCOSE SERPL-MCNC: 100 MG/DL (ref 65–99)
HBA1C MFR BLD: 8.2 % (ref 4.8–5.6)
HCT VFR BLD AUTO: 46.2 % (ref 37.5–51)
HDLC SERPL-MCNC: 28 MG/DL (ref 40–60)
HGB BLD-MCNC: 14.6 G/DL (ref 13–17.7)
LDLC SERPL CALC-MCNC: 96 MG/DL (ref 0–100)
LYMPHOCYTES # BLD AUTO: (no result) 10*3/UL
LYMPHOCYTES # BLD MANUAL: 3.09 10*3/MM3 (ref 0.7–3.1)
LYMPHOCYTES NFR BLD AUTO: (no result) %
LYMPHOCYTES NFR BLD MANUAL: 34 % (ref 19.6–45.3)
MCH RBC QN AUTO: 32.5 PG (ref 26.6–33)
MCHC RBC AUTO-ENTMCNC: 31.6 G/DL (ref 31.5–35.7)
MCV RBC AUTO: 102.9 FL (ref 79–97)
MICROALBUMIN UR-MCNC: 18.5 UG/ML
MONOCYTES # BLD MANUAL: 0.75 10*3/MM3 (ref 0.1–0.9)
MONOCYTES NFR BLD AUTO: (no result) %
MONOCYTES NFR BLD MANUAL: 8.2 % (ref 5–12)
NEUTROPHILS # BLD MANUAL: 5.06 10*3/MM3 (ref 1.4–7)
NEUTROPHILS NFR BLD AUTO: (no result) %
NEUTROPHILS NFR BLD MANUAL: 55.7 % (ref 42.7–76)
PLATELET # BLD AUTO: 219 10*3/MM3 (ref 140–450)
PLATELET BLD QL SMEAR: NORMAL
POTASSIUM SERPL-SCNC: 5.6 MMOL/L (ref 3.5–5.2)
PROT SERPL-MCNC: 7 G/DL (ref 6–8.5)
PSA SERPL-MCNC: 1.82 NG/ML (ref 0–4)
RBC # BLD AUTO: 4.49 10*6/MM3 (ref 4.14–5.8)
RBC MORPH BLD: NORMAL
SODIUM SERPL-SCNC: 138 MMOL/L (ref 136–145)
TRIGL SERPL-MCNC: 305 MG/DL (ref 0–150)
VLDLC SERPL CALC-MCNC: 61 MG/DL (ref 5–40)
WBC # BLD AUTO: 9.09 10*3/MM3 (ref 3.4–10.8)

## 2019-04-11 DIAGNOSIS — E87.5 SERUM POTASSIUM ELEVATED: Primary | ICD-10-CM

## 2019-04-11 NOTE — PROGRESS NOTES
Pt informed his dm control is not good and his K+ is high. Needs to get this repeated at one of the hospitals. He is going to P to get done. Order put in.

## 2019-04-12 ENCOUNTER — LAB (OUTPATIENT)
Dept: LAB | Facility: HOSPITAL | Age: 59
End: 2019-04-12

## 2019-04-12 DIAGNOSIS — E87.5 SERUM POTASSIUM ELEVATED: ICD-10-CM

## 2019-04-12 LAB — POTASSIUM BLD-SCNC: 5.4 MMOL/L (ref 3.5–5.3)

## 2019-04-12 PROCEDURE — 84132 ASSAY OF SERUM POTASSIUM: CPT | Performed by: FAMILY MEDICINE

## 2019-05-24 ENCOUNTER — TELEPHONE (OUTPATIENT)
Dept: FAMILY MEDICINE CLINIC | Facility: CLINIC | Age: 59
End: 2019-05-24

## 2019-05-24 NOTE — TELEPHONE ENCOUNTER
I suggest walk in ortho clinic--he will get instant reading on xray and treatment instead of having to wait for reading report on Tuesday--Monday holiday

## 2019-05-24 NOTE — TELEPHONE ENCOUNTER
Pt called said that he hurt his right leg last night he dont know if he has fx or pulled muscle but is in pain he asked for xray or do u want to see or send to ortho walk in [phone]

## 2019-11-08 ENCOUNTER — OFFICE VISIT (OUTPATIENT)
Dept: FAMILY MEDICINE CLINIC | Facility: CLINIC | Age: 59
End: 2019-11-08

## 2019-11-08 ENCOUNTER — TELEPHONE (OUTPATIENT)
Dept: FAMILY MEDICINE CLINIC | Facility: CLINIC | Age: 59
End: 2019-11-08

## 2019-11-08 VITALS
HEART RATE: 101 BPM | HEIGHT: 72 IN | WEIGHT: 234 LBS | BODY MASS INDEX: 31.69 KG/M2 | OXYGEN SATURATION: 97 % | SYSTOLIC BLOOD PRESSURE: 128 MMHG | DIASTOLIC BLOOD PRESSURE: 88 MMHG | RESPIRATION RATE: 16 BRPM

## 2019-11-08 DIAGNOSIS — I10 ESSENTIAL HYPERTENSION: Primary | ICD-10-CM

## 2019-11-08 DIAGNOSIS — E11.9 TYPE 2 DIABETES MELLITUS WITHOUT COMPLICATION, WITHOUT LONG-TERM CURRENT USE OF INSULIN (HCC): ICD-10-CM

## 2019-11-08 DIAGNOSIS — Z23 NEED FOR VACCINATION: ICD-10-CM

## 2019-11-08 PROCEDURE — 90674 CCIIV4 VAC NO PRSV 0.5 ML IM: CPT | Performed by: FAMILY MEDICINE

## 2019-11-08 PROCEDURE — 90471 IMMUNIZATION ADMIN: CPT | Performed by: FAMILY MEDICINE

## 2019-11-08 PROCEDURE — 99213 OFFICE O/P EST LOW 20 MIN: CPT | Performed by: FAMILY MEDICINE

## 2019-11-08 RX ORDER — FLASH GLUCOSE SENSOR
1 KIT MISCELLANEOUS
Qty: 1 EACH | Refills: 4 | Status: ON HOLD | OUTPATIENT
Start: 2019-11-08 | End: 2022-06-02

## 2019-11-08 RX ORDER — CLARITHROMYCIN 500 MG/1
500 TABLET, COATED ORAL EVERY 12 HOURS SCHEDULED
Qty: 20 TABLET | Refills: 0 | Status: SHIPPED | OUTPATIENT
Start: 2019-11-08 | End: 2019-11-18

## 2019-11-08 RX ORDER — FLASH GLUCOSE SCANNING READER
1 EACH MISCELLANEOUS DAILY
Qty: 1 DEVICE | Refills: 0 | Status: ON HOLD | OUTPATIENT
Start: 2019-11-08 | End: 2022-06-02

## 2019-11-08 NOTE — TELEPHONE ENCOUNTER
Ashley stopped before he left the office & said that he req med for productive cough w/ green mucus & congestion.  [facility]

## 2019-11-08 NOTE — PROGRESS NOTES
"Subjective   Ashley Bradley is a 59 y.o. male.     Chief Complaint   Patient presents with   • Follow-up     6 mo   htn        History of Present Illness     he thinks his bs are doing better---he thinks bp is better wthout cp or ha      Current Outpatient Medications:   •  diclofenac (VOLTAREN) 75 MG EC tablet, Take 1 tablet by mouth 2 (Two) Times a Day., Disp: 180 tablet, Rfl: 3  •  lisinopril (PRINIVIL,ZESTRIL) 40 MG tablet, Take 1 tablet by mouth Daily., Disp: 90 tablet, Rfl: 3  •  metFORMIN ER (GLUCOPHAGE XR) 500 MG 24 hr tablet, Take 2 tablets with breakfast every day, Disp: 180 tablet, Rfl: 3  •  ONE TOUCH ULTRA TEST test strip, Use to test blood sugar twice daily, Disp: 200 each, Rfl: 3  •  ONETOUCH DELICA LANCETS FINE misc, Use to test blood sugar twice daily, Disp: 200 each, Rfl: 3  Allergies   Allergen Reactions   • Penicillins        Past Medical History:   Diagnosis Date   • Diabetes mellitus (CMS/HCC)    • Diverticula of colon    • GERD (gastroesophageal reflux disease)    • Hypertension      Past Surgical History:   Procedure Laterality Date   • COLONOSCOPY  11/01/2010    snare 70, 60, rectum tics recall 5 years   • COLONOSCOPY Left 11/3/2016    Procedure: COLONOSCOPY WITH ANESTHESIA;  Surgeon: Lisandro Holt MD;  Location: [facility] ENDOSCOPY;  Service:    • HEMORRHOIDECTOMY     • HERNIA REPAIR     • TONSILLECTOMY         Review of Systems   Constitutional: Negative.    HENT: Negative.    Eyes: Negative.    Respiratory: Negative.    Cardiovascular: Negative.    Gastrointestinal: Negative.    Endocrine: Negative.    Genitourinary: Negative.    Musculoskeletal: Negative.    Skin: Negative.    Allergic/Immunologic: Negative.    Neurological: Negative.    Hematological: Negative.    Psychiatric/Behavioral: Negative.        Objective  /88   Pulse 101   Resp 16   Ht 182.9 cm (72\")   Wt 106 kg (234 lb)   SpO2 97%   BMI 31.74 kg/m²   Physical Exam   Constitutional: He is oriented to person, " place, and time. He appears well-developed and well-nourished.   HENT:   Head: Normocephalic and atraumatic.   Right Ear: External ear normal.   Left Ear: External ear normal.   Nose: Nose normal.   Mouth/Throat: Oropharynx is clear and moist.   Eyes: Conjunctivae and EOM are normal. Pupils are equal, round, and reactive to light.   Neck: Normal range of motion. Neck supple.   Cardiovascular: Normal rate, regular rhythm, normal heart sounds and intact distal pulses.   Pulmonary/Chest: Effort normal and breath sounds normal.   Abdominal: Soft. Bowel sounds are normal.   Musculoskeletal: Normal range of motion.   Neurological: He is alert and oriented to person, place, and time.   Skin: Skin is warm. Capillary refill takes less than 2 seconds.   Psychiatric: He has a normal mood and affect. His behavior is normal. Judgment and thought content normal.   Nursing note and vitals reviewed.      Assessment/Plan   Ashley was seen today for follow-up.    Diagnoses and all orders for this visit:    Essential hypertension  -     Comprehensive metabolic panel  -     CBC w AUTO Differential  -     Hemoglobin A1c    Type 2 diabetes mellitus without complication, without long-term current use of insulin (CMS/[facility])  -     Comprehensive metabolic panel  -     CBC w AUTO Differential  -     Hemoglobin A1c                 Orders Placed This Encounter   Procedures   • Comprehensive metabolic panel   • Hemoglobin A1c   • CBC w AUTO Differential     Order Specific Question:   Manual Differential     Answer:   No       Follow up: 4 month(s)

## 2019-11-09 LAB
ALBUMIN SERPL-MCNC: 4.8 G/DL (ref 3.5–5.2)
ALBUMIN/GLOB SERPL: 2.1 G/DL
ALP SERPL-CCNC: 100 U/L (ref 39–117)
ALT SERPL-CCNC: 46 U/L (ref 1–41)
AST SERPL-CCNC: 26 U/L (ref 1–40)
BASOPHILS # BLD AUTO: (no result) 10*3/UL
BASOPHILS # BLD MANUAL: 0.11 10*3/MM3 (ref 0–0.2)
BASOPHILS NFR BLD MANUAL: 1 % (ref 0–1.5)
BILIRUB SERPL-MCNC: 0.4 MG/DL (ref 0.2–1.2)
BUN SERPL-MCNC: 10 MG/DL (ref 8–23)
BUN/CREAT SERPL: 10.9 (ref 7–25)
CALCIUM SERPL-MCNC: 9.7 MG/DL (ref 8.6–10.5)
CHLORIDE SERPL-SCNC: 99 MMOL/L (ref 98–107)
CO2 SERPL-SCNC: 27.4 MMOL/L (ref 22–29)
CREAT SERPL-MCNC: 0.92 MG/DL (ref 0.76–1.27)
DIFFERENTIAL COMMENT: ABNORMAL
EOSINOPHIL # BLD AUTO: (no result) 10*3/UL
EOSINOPHIL NFR BLD AUTO: (no result) %
ERYTHROCYTE [DISTWIDTH] IN BLOOD BY AUTOMATED COUNT: 11.8 % (ref 12.3–15.4)
GLOBULIN SER CALC-MCNC: 2.3 GM/DL
GLUCOSE SERPL-MCNC: 112 MG/DL (ref 65–99)
HBA1C MFR BLD: 6.8 % (ref 4.8–5.6)
HCT VFR BLD AUTO: 44.5 % (ref 37.5–51)
HGB BLD-MCNC: 15.7 G/DL (ref 13–17.7)
LYMPHOCYTES # BLD AUTO: (no result) 10*3/UL
LYMPHOCYTES # BLD MANUAL: 3.44 10*3/MM3 (ref 0.7–3.1)
LYMPHOCYTES NFR BLD AUTO: (no result) %
LYMPHOCYTES NFR BLD MANUAL: 32 % (ref 19.6–45.3)
MCH RBC QN AUTO: 33.8 PG (ref 26.6–33)
MCHC RBC AUTO-ENTMCNC: 35.3 G/DL (ref 31.5–35.7)
MCV RBC AUTO: 95.7 FL (ref 79–97)
MONOCYTES # BLD MANUAL: 1.11 10*3/MM3 (ref 0.1–0.9)
MONOCYTES NFR BLD AUTO: (no result) %
MONOCYTES NFR BLD MANUAL: 10.3 % (ref 5–12)
NEUTROPHILS # BLD MANUAL: 5.99 10*3/MM3 (ref 1.7–7)
NEUTROPHILS NFR BLD AUTO: (no result) %
NEUTROPHILS NFR BLD MANUAL: 55.7 % (ref 42.7–76)
PLATELET # BLD AUTO: 206 10*3/MM3 (ref 140–450)
PLATELET BLD QL SMEAR: ABNORMAL
POTASSIUM SERPL-SCNC: 5.3 MMOL/L (ref 3.5–5.2)
PROT SERPL-MCNC: 7.1 G/DL (ref 6–8.5)
RBC # BLD AUTO: 4.65 10*6/MM3 (ref 4.14–5.8)
RBC MORPH BLD: ABNORMAL
SODIUM SERPL-SCNC: 139 MMOL/L (ref 136–145)
WBC # BLD AUTO: 10.75 10*3/MM3 (ref 3.4–10.8)

## 2019-12-30 ENCOUNTER — TELEPHONE (OUTPATIENT)
Dept: FAMILY MEDICINE CLINIC | Facility: CLINIC | Age: 59
End: 2019-12-30

## 2019-12-30 RX ORDER — BENZONATATE 200 MG/1
200 CAPSULE ORAL 3 TIMES DAILY PRN
Qty: 30 CAPSULE | Refills: 0 | Status: SHIPPED | OUTPATIENT
Start: 2019-12-30 | End: 2020-03-13

## 2019-12-30 RX ORDER — CLARITHROMYCIN 500 MG/1
500 TABLET, COATED ORAL EVERY 12 HOURS SCHEDULED
Qty: 20 TABLET | Refills: 0 | Status: SHIPPED | OUTPATIENT
Start: 2019-12-30 | End: 2020-01-09

## 2019-12-30 RX ORDER — GUAIFENESIN/DEXTROMETHORPHAN 100-10MG/5
5 SYRUP ORAL EVERY 6 HOURS PRN
Qty: 125 ML | Refills: 0 | Status: SHIPPED | OUTPATIENT
Start: 2019-12-30 | End: 2020-03-13

## 2019-12-30 NOTE — TELEPHONE ENCOUNTER
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

## 2019-12-30 NOTE — TELEPHONE ENCOUNTER
Pt called and said that he is having coughing a lot of chest congestion head congestion runny nose he is on the boat and asked if u will send in anbtc? [phone]

## 2020-01-03 ENCOUNTER — TELEPHONE (OUTPATIENT)
Dept: FAMILY MEDICINE CLINIC | Facility: CLINIC | Age: 60
End: 2020-01-03

## 2020-01-03 NOTE — TELEPHONE ENCOUNTER
Jovita breaux/ [facility] called on behalf of [facility].  She said that due to Ashley's dx of DM & elevated cholesterol as well as being over 40 y/o,  they recommend putting him on a statin.      REF # [id number]   Ph# [phone]

## 2020-03-13 ENCOUNTER — HOSPITAL ENCOUNTER (OUTPATIENT)
Dept: GENERAL RADIOLOGY | Facility: HOSPITAL | Age: 60
Discharge: HOME | End: 2020-03-13
Admitting: FAMILY MEDICINE

## 2020-03-13 ENCOUNTER — OFFICE VISIT (OUTPATIENT)
Dept: FAMILY MEDICINE CLINIC | Facility: CLINIC | Age: 60
End: 2020-03-13

## 2020-03-13 VITALS
SYSTOLIC BLOOD PRESSURE: 128 MMHG | BODY MASS INDEX: 32.1 KG/M2 | HEART RATE: 100 BPM | HEIGHT: 72 IN | RESPIRATION RATE: 16 BRPM | WEIGHT: 237 LBS | OXYGEN SATURATION: 95 % | DIASTOLIC BLOOD PRESSURE: 86 MMHG

## 2020-03-13 DIAGNOSIS — J40 BRONCHITIS: ICD-10-CM

## 2020-03-13 DIAGNOSIS — E11.9 TYPE 2 DIABETES MELLITUS WITHOUT COMPLICATION, WITHOUT LONG-TERM CURRENT USE OF INSULIN (HCC): ICD-10-CM

## 2020-03-13 DIAGNOSIS — I10 ESSENTIAL HYPERTENSION: Primary | ICD-10-CM

## 2020-03-13 DIAGNOSIS — J01.90 ACUTE SINUSITIS, RECURRENCE NOT SPECIFIED, UNSPECIFIED LOCATION: ICD-10-CM

## 2020-03-13 PROCEDURE — 71046 X-RAY EXAM CHEST 2 VIEWS: CPT

## 2020-03-13 PROCEDURE — 99213 OFFICE O/P EST LOW 20 MIN: CPT | Performed by: FAMILY MEDICINE

## 2020-03-13 RX ORDER — LEVOFLOXACIN 500 MG/1
500 TABLET, FILM COATED ORAL DAILY
Qty: 10 TABLET | Refills: 0 | Status: SHIPPED | OUTPATIENT
Start: 2020-03-13 | End: 2021-08-27

## 2020-03-13 NOTE — PROGRESS NOTES
Subjective   Ashley Bradley is a 59 y.o. male.     Chief Complaint   Patient presents with   • Follow-up     4 mo   htn   • Cough     productive cough       History of Present Illness     he thinks bs and bp are dsoing well--he is coughing up green to brown sputum without fver or hemoptysis      Current Outpatient Medications:   •  Continuous Blood Gluc  (FREESTYLE MAURI 14 DAY READER) device, 1 each Daily., Disp: 1 Device, Rfl: 0  •  Continuous Blood Gluc Sensor (FREESTYLE MAURI 14 DAY SENSOR) misc, 1 each Every 14 (Fourteen) Days., Disp: 1 each, Rfl: 4  •  diclofenac (VOLTAREN) 75 MG EC tablet, Take 1 tablet by mouth 2 (Two) Times a Day., Disp: 180 tablet, Rfl: 3  •  levoFLOXacin (LEVAQUIN) 500 MG tablet, Take 1 tablet by mouth Daily., Disp: 10 tablet, Rfl: 0  •  lisinopril (PRINIVIL,ZESTRIL) 40 MG tablet, Take 1 tablet by mouth Daily., Disp: 90 tablet, Rfl: 3  •  metFORMIN ER (GLUCOPHAGE XR) 500 MG 24 hr tablet, Take 2 tablets with breakfast every day, Disp: 180 tablet, Rfl: 3  •  ONE TOUCH ULTRA TEST test strip, Use to test blood sugar twice daily, Disp: 200 each, Rfl: 3  •  ONETOUCH DELICA LANCETS FINE misc, Use to test blood sugar twice daily, Disp: 200 each, Rfl: 3  Allergies   Allergen Reactions   • Penicillins        Past Medical History:   Diagnosis Date   • Diabetes mellitus (CMS/HCC)    • Diverticula of colon    • GERD (gastroesophageal reflux disease)    • Hypertension      Past Surgical History:   Procedure Laterality Date   • COLONOSCOPY  11/01/2010    snare 70, 59, rectum tics recall 5 years   • COLONOSCOPY Left 11/3/2016    Procedure: COLONOSCOPY WITH ANESTHESIA;  Surgeon: Lisandro Holt MD;  Location: [facility] ENDOSCOPY;  Service:    • HEMORRHOIDECTOMY     • HERNIA REPAIR     • TONSILLECTOMY         Review of Systems   Constitutional: Negative.    HENT: Negative.    Eyes: Negative.    Respiratory: Positive for cough.    Cardiovascular: Negative.    Gastrointestinal: Negative.    Endocrine:  "Negative.    Genitourinary: Negative.    Musculoskeletal: Negative.    Skin: Negative.    Allergic/Immunologic: Negative.    Neurological: Negative.    Hematological: Negative.    Psychiatric/Behavioral: Negative.        Objective  /86   Pulse 100   Resp 16   Ht 182.9 cm (72\")   Wt 108 kg (237 lb)   SpO2 95%   BMI 32.14 kg/m²   Physical Exam   Constitutional: He is oriented to person, place, and time. He appears well-developed and well-nourished.   HENT:   Head: Normocephalic and atraumatic.   Right Ear: External ear normal.   Left Ear: External ear normal.   Nose: Nose normal.   Mouth/Throat: Oropharynx is clear and moist.   Eyes: Pupils are equal, round, and reactive to light. Conjunctivae and EOM are normal.   Neck: Normal range of motion. Neck supple.   Cardiovascular: Normal rate, regular rhythm, normal heart sounds and intact distal pulses.   Pulmonary/Chest: Effort normal and breath sounds normal.   Abdominal: Soft. Bowel sounds are normal.   Musculoskeletal: Normal range of motion.   Neurological: He is alert and oriented to person, place, and time.   Skin: Skin is warm. Capillary refill takes less than 2 seconds.   Psychiatric: He has a normal mood and affect. His behavior is normal. Judgment and thought content normal.   Nursing note and vitals reviewed.      Assessment/Plan   Ashley was seen today for follow-up and cough.    Diagnoses and all orders for this visit:    Essential hypertension    Type 2 diabetes mellitus without complication, without long-term current use of insulin (CMS/[facility])    Acute sinusitis, recurrence not specified, unspecified location  -     XR Chest PA & Lateral    Bronchitis  -     XR Chest PA & Lateral    Other orders  -     levoFLOXacin (LEVAQUIN) 500 MG tablet; Take 1 tablet by mouth Daily.                 Orders Placed This Encounter   Procedures   • XR Chest PA & Lateral     Order Specific Question:   Reason for Exam:     Answer:   cough     Order Specific Question: "   Does this patient have a diabetic monitoring/medication delivering device on?     Answer:   No       Follow up: 2 month(s) with labs

## 2020-03-20 ENCOUNTER — TELEPHONE (OUTPATIENT)
Dept: FAMILY MEDICINE CLINIC | Facility: CLINIC | Age: 60
End: 2020-03-20

## 2020-03-20 NOTE — TELEPHONE ENCOUNTER
I think where she is coming from is the fact that lisinopril acts in the lungs--I think a much bigger concern is the cig smoking hx--this would be the time to stop smoking certainly--

## 2020-03-20 NOTE — TELEPHONE ENCOUNTER
"Notified pt as per dr arreguin's information and he said that he \"only smokes one every once in a while\"    "

## 2020-03-20 NOTE — TELEPHONE ENCOUNTER
Ashley called and asked if you had heard or read anything about taking lisinopril and covid 19.  He says that his fiance saw on the news that he only has a 20% chance of survival if he contracts covid-19 due to him taking lisinopril.

## 2020-05-01 RX ORDER — METFORMIN HYDROCHLORIDE 500 MG/1
TABLET, EXTENDED RELEASE ORAL
Qty: 180 TABLET | Refills: 3 | Status: SHIPPED | OUTPATIENT
Start: 2020-05-01 | End: 2021-06-14

## 2020-05-01 RX ORDER — DICLOFENAC SODIUM 75 MG/1
TABLET, DELAYED RELEASE ORAL
Qty: 180 TABLET | Refills: 3 | Status: SHIPPED | OUTPATIENT
Start: 2020-05-01 | End: 2021-06-14

## 2020-05-01 RX ORDER — LISINOPRIL 40 MG/1
TABLET ORAL
Qty: 90 TABLET | Refills: 3 | Status: SHIPPED | OUTPATIENT
Start: 2020-05-01 | End: 2021-06-14

## 2020-07-14 ENCOUNTER — TELEPHONE (OUTPATIENT)
Dept: FAMILY MEDICINE CLINIC | Facility: CLINIC | Age: 60
End: 2020-07-14

## 2020-07-14 RX ORDER — PREDNISONE 10 MG/1
TABLET ORAL
Qty: 18 TABLET | Refills: 0 | Status: SHIPPED | OUTPATIENT
Start: 2020-07-14 | End: 2021-08-27

## 2020-11-04 RX ORDER — VARENICLINE TARTRATE 1 MG/1
1 TABLET, FILM COATED ORAL 2 TIMES DAILY
Qty: 56 TABLET | Refills: 1 | Status: SHIPPED | OUTPATIENT
Start: 2020-12-02 | End: 2020-11-30

## 2020-11-04 NOTE — TELEPHONE ENCOUNTER
chantix start pack--be warned this can cause weird dreams and signficant depression so be aware--congrats on stopping smoking

## 2020-11-04 NOTE — TELEPHONE ENCOUNTER
Patient called in and stated that he would like some medication to quit smoking. He has heard of chantix and would like to give it a try. Please advise on whether this can be done or not.    Callback #: [phone]

## 2020-11-30 RX ORDER — VARENICLINE TARTRATE 1 MG/1
TABLET, FILM COATED ORAL
Qty: 56 TABLET | Refills: 1 | Status: SHIPPED | OUTPATIENT
Start: 2020-11-30 | End: 2021-01-05

## 2020-11-30 NOTE — TELEPHONE ENCOUNTER
Requested Prescriptions     Pending Prescriptions Disp Refills   • Chantix Continuing Month Aden 1 MG tablet [Pharmacy Med Name: CHANTIX 1 MG CONT MONTH BOX] 56 tablet 1     Sig: TAKE 1 TABLET BY MOUTH 2 (TWO) TIMES A DAY FOR 56 DAYS.     Last OV was 3/13/20

## 2021-01-05 RX ORDER — VARENICLINE TARTRATE 1 MG/1
TABLET, FILM COATED ORAL
Qty: 56 TABLET | Refills: 1 | Status: SHIPPED | OUTPATIENT
Start: 2021-01-05 | End: 2021-01-21 | Stop reason: SDUPTHER

## 2021-01-05 NOTE — TELEPHONE ENCOUNTER
Requested Prescriptions     Pending Prescriptions Disp Refills   • Chantix Continuing Month Aden 1 MG tablet [Pharmacy Med Name: CHANTIX 1 MG CONT MONTH BOX] 56 tablet 1     Sig: TAKE 1 TABLET BY MOUTH 2 (TWO) TIMES A DAY FOR 56 DAYS.

## 2021-01-21 RX ORDER — VARENICLINE TARTRATE 1 MG/1
1 TABLET, FILM COATED ORAL DAILY
Qty: 60 TABLET | Refills: 1 | Status: SHIPPED | OUTPATIENT
Start: 2021-01-21 | End: 2021-03-16 | Stop reason: SDUPTHER

## 2021-03-16 RX ORDER — VARENICLINE TARTRATE 1 MG/1
1 TABLET, FILM COATED ORAL 2 TIMES DAILY
Qty: 60 TABLET | Refills: 2 | Status: SHIPPED | OUTPATIENT
Start: 2021-03-16 | End: 2021-08-27

## 2021-03-24 ENCOUNTER — HOSPITAL ENCOUNTER (OUTPATIENT)
Dept: NON INVASIVE DIAGNOSTICS | Age: 61
Discharge: HOME | End: 2021-03-24

## 2021-03-24 DIAGNOSIS — Z00.00 ENCOUNTER FOR GENERAL ADULT MEDICAL EXAMINATION WITHOUT ABNORMAL FINDINGS: ICD-10-CM

## 2021-03-24 LAB
LV EF: 50 %
LVEF MODALITY: NORMAL

## 2021-03-24 PROCEDURE — 6360000004 HC RX CONTRAST MEDICATION: Performed by: INTERNAL MEDICINE

## 2021-03-24 PROCEDURE — C8928 TTE W OR W/O FOL W/CON,STRES: HCPCS

## 2021-03-24 RX ADMIN — PERFLUTREN 1.65 MG: 6.52 INJECTION, SUSPENSION INTRAVENOUS at 11:10

## 2021-06-14 RX ORDER — DICLOFENAC SODIUM 75 MG/1
75 TABLET, DELAYED RELEASE ORAL 2 TIMES DAILY
Qty: 180 TABLET | Refills: 0 | Status: SHIPPED | OUTPATIENT
Start: 2021-06-14 | End: 2021-10-26

## 2021-06-14 RX ORDER — METFORMIN HYDROCHLORIDE 500 MG/1
1000 TABLET, EXTENDED RELEASE ORAL
Qty: 180 TABLET | Refills: 0 | Status: SHIPPED | OUTPATIENT
Start: 2021-06-14 | End: 2021-10-26

## 2021-06-14 RX ORDER — LISINOPRIL 40 MG/1
40 TABLET ORAL DAILY
Qty: 90 TABLET | Refills: 0 | Status: SHIPPED | OUTPATIENT
Start: 2021-06-14 | End: 2021-10-26

## 2021-06-14 NOTE — TELEPHONE ENCOUNTER
LAST OV 3/13/2020      Requested Prescriptions     Pending Prescriptions Disp Refills   • metFORMIN ER (GLUCOPHAGE-XR) 500 MG 24 hr tablet [Pharmacy Med Name: METFORMIN HCL  MG TABLET] 180 tablet 0     Sig: Take 2 tablets by mouth Daily With Breakfast. CALL OFFICE TO SCHEDULE APPOINTMENT   • lisinopril (PRINIVIL,ZESTRIL) 40 MG tablet [Pharmacy Med Name: LISINOPRIL 40 MG TABLET] 90 tablet 0     Sig: Take 1 tablet by mouth Daily. CALL OFFICE TO SCHEDULE APPOINTMENT   • diclofenac (VOLTAREN) 75 MG EC tablet [Pharmacy Med Name: DICLOFENAC SOD EC 75 MG TAB] 180 tablet 0     Sig: Take 1 tablet by mouth 2 (Two) Times a Day. CALL OFFICE TO SCHEDULE APPOINTMENT        Problem: GI Endoscopy (Adult)  Goal: Signs and Symptoms of Listed Potential Problems Will be Absent or Manageable (GI Endoscopy)  Outcome: Outcome(s) achieved Date Met:  03/22/17

## 2021-08-19 ENCOUNTER — TELEPHONE (OUTPATIENT)
Dept: FAMILY MEDICINE CLINIC | Facility: CLINIC | Age: 61
End: 2021-08-19

## 2021-08-19 NOTE — TELEPHONE ENCOUNTER
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 [phone]

## 2021-08-27 ENCOUNTER — OFFICE VISIT (OUTPATIENT)
Dept: FAMILY MEDICINE CLINIC | Facility: CLINIC | Age: 61
End: 2021-08-27

## 2021-08-27 VITALS
BODY MASS INDEX: 30.2 KG/M2 | DIASTOLIC BLOOD PRESSURE: 80 MMHG | HEIGHT: 72 IN | TEMPERATURE: 97.5 F | HEART RATE: 85 BPM | RESPIRATION RATE: 18 BRPM | OXYGEN SATURATION: 96 % | SYSTOLIC BLOOD PRESSURE: 120 MMHG | WEIGHT: 223 LBS

## 2021-08-27 DIAGNOSIS — E11.9 TYPE 2 DIABETES MELLITUS WITHOUT COMPLICATION, WITHOUT LONG-TERM CURRENT USE OF INSULIN (HCC): ICD-10-CM

## 2021-08-27 DIAGNOSIS — I10 ESSENTIAL HYPERTENSION: Primary | ICD-10-CM

## 2021-08-27 DIAGNOSIS — Z12.5 SCREENING FOR MALIGNANT NEOPLASM OF PROSTATE: ICD-10-CM

## 2021-08-27 PROBLEM — F17.200 SMOKER: Status: ACTIVE | Noted: 2021-08-27

## 2021-08-27 PROBLEM — IMO0002 DEGENERATION OF INTERVERTEBRAL DISC: Status: ACTIVE | Noted: 2021-08-27

## 2021-08-27 PROCEDURE — 99213 OFFICE O/P EST LOW 20 MIN: CPT | Performed by: FAMILY MEDICINE

## 2021-08-27 NOTE — PROGRESS NOTES
Subjective   Ashley Bradley is a 61 y.o. male.     Chief Complaint   Patient presents with   • Diabetes     Patient is having high readings.       History of Present Illness     as above ..he notes good bp control without cp or ha      Current Outpatient Medications:   •  Continuous Blood Gluc  (FREESTYLE MAURI 14 DAY READER) device, 1 each Daily., Disp: 1 Device, Rfl: 0  •  Continuous Blood Gluc Sensor (FREESTYLE MAURI 14 DAY SENSOR) misc, 1 each Every 14 (Fourteen) Days., Disp: 1 each, Rfl: 4  •  diclofenac (VOLTAREN) 75 MG EC tablet, Take 1 tablet by mouth 2 (Two) Times a Day. CALL OFFICE TO SCHEDULE APPOINTMENT, Disp: 180 tablet, Rfl: 0  •  lisinopril (PRINIVIL,ZESTRIL) 40 MG tablet, Take 1 tablet by mouth Daily. CALL OFFICE TO SCHEDULE APPOINTMENT, Disp: 90 tablet, Rfl: 0  •  metFORMIN ER (GLUCOPHAGE-XR) 500 MG 24 hr tablet, Take 2 tablets by mouth Daily With Breakfast. CALL OFFICE TO SCHEDULE APPOINTMENT, Disp: 180 tablet, Rfl: 0  •  ONETOUCH DELICA LANCETS FINE misc, Use to test blood sugar twice daily, Disp: 200 each, Rfl: 3  Allergies   Allergen Reactions   • Penicillamine Unknown - High Severity   • Penicillins        Past Medical History:   Diagnosis Date   • Diabetes mellitus (CMS/HCC)    • Diverticula of colon    • GERD (gastroesophageal reflux disease)    • Hypertension      Past Surgical History:   Procedure Laterality Date   • COLONOSCOPY  11/01/2010    snare 70, 60, rectum tics recall 5 years   • COLONOSCOPY Left 11/3/2016    Procedure: COLONOSCOPY WITH ANESTHESIA;  Surgeon: Lisandro Holt MD;  Location: [facility] ENDOSCOPY;  Service:    • HEMORRHOIDECTOMY     • HERNIA REPAIR     • TONSILLECTOMY         Review of Systems   Constitutional: Negative.    HENT: Negative.    Eyes: Negative.    Respiratory: Negative.    Cardiovascular: Negative.    Gastrointestinal: Negative.    Endocrine: Negative.    Genitourinary: Negative.    Musculoskeletal: Negative.    Skin: Negative.   "  Allergic/Immunologic: Negative.    Neurological: Negative.    Hematological: Negative.    Psychiatric/Behavioral: Negative.        Objective  /80 (BP Location: Left arm, Patient Position: Sitting, Cuff Size: Adult)   Pulse 85   Temp 97.5 °F (36.4 °C) (Infrared)   Resp 18   Ht 182.9 cm (72\")   Wt 101 kg (223 lb)   SpO2 96%   BMI 30.24 kg/m²   Physical Exam  Vitals and nursing note reviewed.   Constitutional:       Appearance: Normal appearance. He is normal weight.   HENT:      Head: Normocephalic and atraumatic.      Nose: Nose normal.      Mouth/Throat:      Mouth: Mucous membranes are dry.   Eyes:      Extraocular Movements: Extraocular movements intact.      Pupils: Pupils are equal, round, and reactive to light.   Cardiovascular:      Rate and Rhythm: Normal rate and regular rhythm.      Pulses: Normal pulses.   Pulmonary:      Effort: Pulmonary effort is normal.      Breath sounds: Normal breath sounds.   Abdominal:      General: Abdomen is flat. Bowel sounds are normal.      Palpations: Abdomen is soft.   Musculoskeletal:         General: Normal range of motion.      Cervical back: Normal range of motion and neck supple.   Skin:     General: Skin is warm and dry.      Capillary Refill: Capillary refill takes less than 2 seconds.   Neurological:      General: No focal deficit present.      Mental Status: He is alert and oriented to person, place, and time. Mental status is at baseline.   Psychiatric:         Mood and Affect: Mood normal.         Behavior: Behavior normal.         Thought Content: Thought content normal.         Judgment: Judgment normal.     Diabetic foot exam:   Left: Filament test absent   Pulses Dorsalis Pedis:  present   Reflexes 3+    Vibratory sensation normal   Proprioception normal   Sharp/dull discrimination diminished       Right: Filament test present   Pulses Dorsalis Pedis:  present   Reflexes 3+    Vibratory sensation diminished   Proprioception " diminished   Sharp/dull discrimination diminished      Assessment/Plan   Diagnoses and all orders for this visit:    1. Essential hypertension (Primary)  -     CBC & Differential  -     Comprehensive metabolic panel  -     Lipid Panel With / Chol / HDL Ratio  -     Hemoglobin A1c  -     Hepatitis C Antibody  -     PSA Screen  -     MicroAlbumin, Urine, Random - Urine, Clean Catch    2. Type 2 diabetes mellitus without complication, without long-term current use of insulin (CMS/HCC)  -     CBC & Differential  -     Comprehensive metabolic panel  -     Lipid Panel With / Chol / HDL Ratio  -     Hemoglobin A1c  -     Hepatitis C Antibody  -     PSA Screen  -     MicroAlbumin, Urine, Random - Urine, Clean Catch    3. Screening for malignant neoplasm of prostate  -     PSA Screen      He will monitor bp and bs and keep me informed           Orders Placed This Encounter   Procedures   • Comprehensive metabolic panel     Order Specific Question:   Release to patient     Answer:   Immediate   • Lipid Panel With / Chol / HDL Ratio     Order Specific Question:   Release to patient     Answer:   Immediate   • Hemoglobin A1c     Order Specific Question:   Release to patient     Answer:   Immediate   • Hepatitis C Antibody     Order Specific Question:   Release to patient     Answer:   Immediate   • PSA Screen     Order Specific Question:   Release to patient     Answer:   Immediate   • MicroAlbumin, Urine, Random - Urine, Clean Catch     Order Specific Question:   Release to patient     Answer:   Immediate   • CBC & Differential       Follow up: 4 month(s)

## 2021-08-30 ENCOUNTER — TELEPHONE (OUTPATIENT)
Dept: FAMILY MEDICINE CLINIC | Facility: CLINIC | Age: 61
End: 2021-08-30

## 2021-08-30 LAB
ALBUMIN SERPL-MCNC: 4.5 G/DL (ref 3.5–5.2)
ALBUMIN/GLOB SERPL: 2 G/DL
ALP SERPL-CCNC: 107 U/L (ref 39–117)
ALT SERPL-CCNC: 38 U/L (ref 1–41)
AST SERPL-CCNC: 28 U/L (ref 1–40)
BASOPHILS # BLD AUTO: ABNORMAL 10*3/UL
BASOPHILS # BLD MANUAL: 0.1 10*3/MM3 (ref 0–0.2)
BASOPHILS NFR BLD MANUAL: 1 % (ref 0–1.5)
BILIRUB SERPL-MCNC: 0.6 MG/DL (ref 0–1.2)
BUN SERPL-MCNC: 11 MG/DL (ref 8–23)
BUN/CREAT SERPL: 10 (ref 7–25)
CALCIUM SERPL-MCNC: 9.7 MG/DL (ref 8.6–10.5)
CHLORIDE SERPL-SCNC: 97 MMOL/L (ref 98–107)
CHOLEST SERPL-MCNC: 199 MG/DL (ref 0–200)
CHOLEST/HDLC SERPL: 6.86 {RATIO}
CO2 SERPL-SCNC: 28.5 MMOL/L (ref 22–29)
CREAT SERPL-MCNC: 1.1 MG/DL (ref 0.76–1.27)
DIFFERENTIAL COMMENT: NORMAL
EOSINOPHIL # BLD AUTO: ABNORMAL 10*3/UL
EOSINOPHIL # BLD MANUAL: 0.32 10*3/MM3 (ref 0–0.4)
EOSINOPHIL NFR BLD AUTO: ABNORMAL %
EOSINOPHIL NFR BLD MANUAL: 3.1 % (ref 0.3–6.2)
ERYTHROCYTE [DISTWIDTH] IN BLOOD BY AUTOMATED COUNT: 12.5 % (ref 12.3–15.4)
GLOBULIN SER CALC-MCNC: 2.2 GM/DL
GLUCOSE SERPL-MCNC: 131 MG/DL (ref 65–99)
HBA1C MFR BLD: NORMAL %
HCT VFR BLD AUTO: 44.8 % (ref 37.5–51)
HCV AB S/CO SERPL IA: <0.1 S/CO RATIO (ref 0–0.9)
HDLC SERPL-MCNC: 29 MG/DL (ref 40–60)
HGB BLD-MCNC: 15 G/DL (ref 13–17.7)
LDLC SERPL CALC-MCNC: 125 MG/DL (ref 0–100)
LYMPHOCYTES # BLD AUTO: ABNORMAL 10*3/UL
LYMPHOCYTES # BLD MANUAL: 2.59 10*3/MM3 (ref 0.7–3.1)
LYMPHOCYTES NFR BLD AUTO: ABNORMAL %
LYMPHOCYTES NFR BLD MANUAL: 25.5 % (ref 19.6–45.3)
MCH RBC QN AUTO: 32.5 PG (ref 26.6–33)
MCHC RBC AUTO-ENTMCNC: 33.5 G/DL (ref 31.5–35.7)
MCV RBC AUTO: 97.2 FL (ref 79–97)
MICROALBUMIN UR-MCNC: 54.2 UG/ML
MONOCYTES # BLD MANUAL: 0.62 10*3/MM3 (ref 0.1–0.9)
MONOCYTES NFR BLD AUTO: ABNORMAL %
MONOCYTES NFR BLD MANUAL: 6.1 % (ref 5–12)
NEUTROPHILS # BLD MANUAL: 6.54 10*3/MM3 (ref 1.7–7)
NEUTROPHILS NFR BLD AUTO: ABNORMAL %
NEUTROPHILS NFR BLD MANUAL: 64.3 % (ref 42.7–76)
PLATELET # BLD AUTO: 260 10*3/MM3 (ref 140–450)
PLATELET BLD QL SMEAR: NORMAL
POTASSIUM SERPL-SCNC: 4.4 MMOL/L (ref 3.5–5.2)
PROT SERPL-MCNC: 6.7 G/DL (ref 6–8.5)
PSA SERPL-MCNC: 1.89 NG/ML (ref 0–4)
RBC # BLD AUTO: 4.61 10*6/MM3 (ref 4.14–5.8)
RBC MORPH BLD: NORMAL
REQUEST PROBLEM: NORMAL
SODIUM SERPL-SCNC: 136 MMOL/L (ref 136–145)
TRIGL SERPL-MCNC: 252 MG/DL (ref 0–150)
VLDLC SERPL CALC-MCNC: 45 MG/DL (ref 5–40)
WBC # BLD AUTO: 10.17 10*3/MM3 (ref 3.4–10.8)

## 2021-08-30 NOTE — TELEPHONE ENCOUNTER
At his appt did you all talke about meds for neuropathy?   H/O umbilical hernia repair  10/15/2014  S/p bilateral blepharoplasty  12/17/2016  S/P colonoscopy  2/1/2018  S/P partial hysterectomy  8/16/2011

## 2021-08-30 NOTE — TELEPHONE ENCOUNTER
Caller: Ashley Bradley    Relationship: Self    Medication needed:   ONETOUCH TEST STRIPS AND MEDICATION FOR NUEROPATHY    What is the patient's preferred pharmacy: [id number] - [place] - [address]  [phone] [facility] [phone] FX

## 2021-08-31 ENCOUNTER — CLINICAL SUPPORT (OUTPATIENT)
Dept: FAMILY MEDICINE CLINIC | Facility: CLINIC | Age: 61
End: 2021-08-31

## 2021-08-31 DIAGNOSIS — E11.9 TYPE 2 DIABETES MELLITUS WITHOUT COMPLICATION, WITHOUT LONG-TERM CURRENT USE OF INSULIN (HCC): Primary | ICD-10-CM

## 2021-08-31 LAB — HBA1C MFR BLD: 8.5 %

## 2021-08-31 PROCEDURE — 83036 HEMOGLOBIN GLYCOSYLATED A1C: CPT | Performed by: FAMILY MEDICINE

## 2021-09-01 DIAGNOSIS — M79.2 NEUROPATHIC PAIN: Primary | ICD-10-CM

## 2021-09-01 RX ORDER — GABAPENTIN 100 MG/1
100 CAPSULE ORAL 3 TIMES DAILY
Qty: 90 CAPSULE | Refills: 0 | Status: SHIPPED | OUTPATIENT
Start: 2021-09-01 | End: 2021-12-07

## 2021-09-13 ENCOUNTER — TELEPHONE (OUTPATIENT)
Dept: FAMILY MEDICINE CLINIC | Facility: CLINIC | Age: 61
End: 2021-09-13

## 2021-09-13 NOTE — TELEPHONE ENCOUNTER
Pt called and stated that he can not get his fasting glucose under 161 he said he even doulbled up on his meds for a couple days and still fasting is elevated he wants to know what to do?

## 2021-10-25 NOTE — TELEPHONE ENCOUNTER
Caller: Ashley Bradley    Relationship: Self      Medication requested (name and dosage):   LISTED BELOW    Requested Prescriptions:   Requested Prescriptions     Pending Prescriptions Disp Refills   • glucose blood test strip 100 each 12     Sig: onetouch test strips-dx e11.9- test twice daily        Pharmacy where request should be sent:   [id number] - [place] - [address] - [phone]  - [phone]   [phone]    Additional details provided by patient:   PATIENT USES ONE TOUCH ULTRA TEST STRIPS    Best call back number: [phone]     Does the patient have less than a 3 day supply:  [x] Yes  [] No    Minh Colbert Rep   10/25/21 10:25 CDT

## 2021-10-26 RX ORDER — METFORMIN HYDROCHLORIDE 500 MG/1
TABLET, EXTENDED RELEASE ORAL
Qty: 180 TABLET | Refills: 0 | Status: SHIPPED | OUTPATIENT
Start: 2021-10-26 | End: 2021-11-19

## 2021-10-26 RX ORDER — DICLOFENAC SODIUM 75 MG/1
TABLET, DELAYED RELEASE ORAL
Qty: 180 TABLET | Refills: 0 | Status: SHIPPED | OUTPATIENT
Start: 2021-10-26 | End: 2021-11-19

## 2021-10-26 RX ORDER — LISINOPRIL 40 MG/1
TABLET ORAL
Qty: 90 TABLET | Refills: 0 | Status: SHIPPED | OUTPATIENT
Start: 2021-10-26 | End: 2021-11-19

## 2021-11-19 RX ORDER — DICLOFENAC SODIUM 75 MG/1
TABLET, DELAYED RELEASE ORAL
Qty: 180 TABLET | Refills: 0 | Status: SHIPPED | OUTPATIENT
Start: 2021-11-19 | End: 2022-03-29

## 2021-11-19 RX ORDER — LISINOPRIL 40 MG/1
TABLET ORAL
Qty: 90 TABLET | Refills: 0 | Status: SHIPPED | OUTPATIENT
Start: 2021-11-19 | End: 2022-03-29

## 2021-11-19 RX ORDER — METFORMIN HYDROCHLORIDE 500 MG/1
TABLET, EXTENDED RELEASE ORAL
Qty: 180 TABLET | Refills: 0 | Status: SHIPPED | OUTPATIENT
Start: 2021-11-19 | End: 2022-03-29

## 2021-12-07 DIAGNOSIS — M79.2 NEUROPATHIC PAIN: ICD-10-CM

## 2021-12-07 RX ORDER — ORAL SEMAGLUTIDE 7 MG/1
TABLET ORAL
Qty: 30 TABLET | Refills: 1 | Status: SHIPPED | OUTPATIENT
Start: 2021-12-07 | End: 2021-12-30 | Stop reason: SDUPTHER

## 2021-12-07 RX ORDER — GABAPENTIN 100 MG/1
CAPSULE ORAL
Qty: 90 CAPSULE | Refills: 0 | Status: SHIPPED | OUTPATIENT
Start: 2021-12-07 | End: 2022-03-30

## 2021-12-30 ENCOUNTER — OFFICE VISIT (OUTPATIENT)
Dept: FAMILY MEDICINE CLINIC | Facility: CLINIC | Age: 61
End: 2021-12-30

## 2021-12-30 VITALS
DIASTOLIC BLOOD PRESSURE: 84 MMHG | OXYGEN SATURATION: 96 % | HEIGHT: 72 IN | RESPIRATION RATE: 16 BRPM | HEART RATE: 84 BPM | WEIGHT: 227 LBS | SYSTOLIC BLOOD PRESSURE: 126 MMHG | BODY MASS INDEX: 30.75 KG/M2

## 2021-12-30 DIAGNOSIS — E11.9 TYPE 2 DIABETES MELLITUS WITHOUT COMPLICATION, WITHOUT LONG-TERM CURRENT USE OF INSULIN (HCC): ICD-10-CM

## 2021-12-30 DIAGNOSIS — I10 ESSENTIAL HYPERTENSION: Primary | ICD-10-CM

## 2021-12-30 PROCEDURE — 99213 OFFICE O/P EST LOW 20 MIN: CPT | Performed by: FAMILY MEDICINE

## 2021-12-30 RX ORDER — ORAL SEMAGLUTIDE 7 MG/1
1 TABLET ORAL DAILY
Qty: 90 TABLET | Refills: 1 | Status: SHIPPED | OUTPATIENT
Start: 2021-12-30 | End: 2022-07-01

## 2021-12-30 NOTE — PROGRESS NOTES
Subjective   Ashley Bradley is a 61 y.o. male.     Chief Complaint   Patient presents with   • Hypertension     4 mo f/u          History of Present Illness     he thinks his bp and his bs have been somewhat elevatd..      Current Outpatient Medications:   •  Continuous Blood Gluc  (FREESTYLE MAURI 14 DAY READER) device, 1 each Daily., Disp: 1 Device, Rfl: 0  •  Continuous Blood Gluc Sensor (FREESTYLE MAURI 14 DAY SENSOR) misc, 1 each Every 14 (Fourteen) Days., Disp: 1 each, Rfl: 4  •  diclofenac (VOLTAREN) 75 MG EC tablet, TAKE 1 TABLET BY MOUTH TWICE A DAY, Disp: 180 tablet, Rfl: 0  •  gabapentin (NEURONTIN) 100 MG capsule, TAKE 1 CAPSULE BY MOUTH THREE TIMES A DAY, Disp: 90 capsule, Rfl: 0  •  glucose blood test strip, onetouch test strips-dx e11.9- test twice daily, Disp: 100 each, Rfl: 12  •  lisinopril (PRINIVIL,ZESTRIL) 40 MG tablet, TAKE 1 TABLET BY MOUTH EVERY DAY, Disp: 90 tablet, Rfl: 0  •  metFORMIN ER (GLUCOPHAGE-XR) 500 MG 24 hr tablet, TAKE 2 TABLETS BY MOUTH DAILY WITH BREAKFAST., Disp: 180 tablet, Rfl: 0  •  ONETOUCH DELICA LANCETS FINE misc, Use to test blood sugar twice daily, Disp: 200 each, Rfl: 3  •  Rybelsus 7 MG tablet, TAKE 1 TABLET BY MOUTH DAILY (START AFTER TAKE 1 MONTH OF 3MG TABLET), Disp: 30 tablet, Rfl: 1  •  Semaglutide 3 MG tablet, Take 1 tablet by mouth Daily., Disp: 30 tablet, Rfl: 0  Allergies   Allergen Reactions   • Penicillamine Unknown - High Severity   • Penicillins        Past Medical History:   Diagnosis Date   • Diabetes mellitus (CMS/HCC)    • Diverticula of colon    • GERD (gastroesophageal reflux disease)    • Hypertension      Past Surgical History:   Procedure Laterality Date   • COLONOSCOPY  11/01/2010    snare 70, 60, rectum tics recall 5 years   • COLONOSCOPY Left 11/3/2016    Procedure: COLONOSCOPY WITH ANESTHESIA;  Surgeon: Lisandro Holt MD;  Location: [facility] ENDOSCOPY;  Service:    • HEMORRHOIDECTOMY     • HERNIA REPAIR     • TONSILLECTOMY    "      Review of Systems   Constitutional: Negative.    HENT: Negative.    Eyes: Negative.    Respiratory: Negative.    Cardiovascular: Negative.    Gastrointestinal: Negative.    Endocrine: Negative.    Genitourinary: Negative.    Musculoskeletal: Negative.    Skin: Negative.    Allergic/Immunologic: Negative.    Neurological: Negative.    Hematological: Negative.        Objective  /84   Pulse 84   Resp 16   Ht 182.9 cm (72\")   Wt 103 kg (227 lb)   SpO2 96%   BMI 30.79 kg/m²   Physical Exam  Vitals and nursing note reviewed.   Constitutional:       Appearance: Normal appearance. He is normal weight.   HENT:      Head: Normocephalic and atraumatic.      Nose: Nose normal.      Mouth/Throat:      Mouth: Mucous membranes are moist.   Eyes:      Extraocular Movements: Extraocular movements intact.      Pupils: Pupils are equal, round, and reactive to light.   Cardiovascular:      Rate and Rhythm: Normal rate and regular rhythm.      Pulses: Normal pulses.      Heart sounds: Normal heart sounds.   Pulmonary:      Effort: Pulmonary effort is normal.      Breath sounds: Normal breath sounds.   Abdominal:      General: Abdomen is flat. Bowel sounds are normal.      Palpations: Abdomen is soft.   Musculoskeletal:         General: Normal range of motion.      Cervical back: Normal range of motion and neck supple.   Skin:     General: Skin is warm and dry.      Capillary Refill: Capillary refill takes less than 2 seconds.   Neurological:      General: No focal deficit present.      Mental Status: He is alert and oriented to person, place, and time. Mental status is at baseline.   Psychiatric:         Mood and Affect: Mood normal.         Assessment/Plan   Diagnoses and all orders for this visit:    1. Essential hypertension (Primary)    2. Type 2 diabetes mellitus without complication, without long-term current use of insulin (HCC)      a1c today           No orders of the defined types were placed in this " encounter.      Follow up: 4 month(s)

## 2022-01-31 ENCOUNTER — HOSPITAL ENCOUNTER (OUTPATIENT)
Dept: INFUSION THERAPY | Facility: HOSPITAL | Age: 62
Discharge: HOME | End: 2022-01-31
Admitting: NURSE PRACTITIONER

## 2022-01-31 VITALS
TEMPERATURE: 98 F | OXYGEN SATURATION: 98 % | SYSTOLIC BLOOD PRESSURE: 146 MMHG | DIASTOLIC BLOOD PRESSURE: 95 MMHG | RESPIRATION RATE: 22 BRPM | HEART RATE: 84 BPM

## 2022-01-31 PROCEDURE — M0247 HC INTRAVENOUS INFUSION SOTROVIMAB: HCPCS | Performed by: NURSE PRACTITIONER

## 2022-01-31 PROCEDURE — 25010000002 SOTROVIMAB 500 MG/8ML SOLUTION 8 ML VIAL: Performed by: NURSE PRACTITIONER

## 2022-01-31 RX ORDER — EPINEPHRINE 0.3 MG/.3ML
0.3 INJECTION SUBCUTANEOUS ONCE AS NEEDED
Status: DISCONTINUED | OUTPATIENT
Start: 2022-01-31 | End: 2022-02-02 | Stop reason: HOSPADM

## 2022-01-31 RX ORDER — SODIUM CHLORIDE 9 MG/ML
30 INJECTION, SOLUTION INTRAVENOUS ONCE
Status: DISCONTINUED | OUTPATIENT
Start: 2022-01-31 | End: 2022-02-02 | Stop reason: HOSPADM

## 2022-01-31 RX ORDER — METHYLPREDNISOLONE SODIUM SUCCINATE 125 MG/2ML
125 INJECTION, POWDER, LYOPHILIZED, FOR SOLUTION INTRAMUSCULAR; INTRAVENOUS ONCE AS NEEDED
Status: DISCONTINUED | OUTPATIENT
Start: 2022-01-31 | End: 2022-02-02 | Stop reason: HOSPADM

## 2022-01-31 RX ORDER — DIPHENHYDRAMINE HCL 50 MG
50 CAPSULE ORAL ONCE AS NEEDED
Status: DISCONTINUED | OUTPATIENT
Start: 2022-01-31 | End: 2022-02-02 | Stop reason: HOSPADM

## 2022-01-31 RX ORDER — DIPHENHYDRAMINE HYDROCHLORIDE 50 MG/ML
50 INJECTION INTRAMUSCULAR; INTRAVENOUS ONCE AS NEEDED
Status: DISCONTINUED | OUTPATIENT
Start: 2022-01-31 | End: 2022-02-02 | Stop reason: HOSPADM

## 2022-01-31 RX ADMIN — SODIUM CHLORIDE 500 MG: 9 INJECTION, SOLUTION INTRAVENOUS at 09:15

## 2022-03-29 RX ORDER — DICLOFENAC SODIUM 75 MG/1
TABLET, DELAYED RELEASE ORAL
Qty: 180 TABLET | Refills: 0 | Status: ON HOLD | OUTPATIENT
Start: 2022-03-29 | End: 2022-06-02

## 2022-03-29 RX ORDER — METFORMIN HYDROCHLORIDE 500 MG/1
TABLET, EXTENDED RELEASE ORAL
Qty: 180 TABLET | Refills: 0 | Status: ON HOLD | OUTPATIENT
Start: 2022-03-29 | End: 2022-06-02

## 2022-03-29 RX ORDER — LISINOPRIL 40 MG/1
TABLET ORAL
Qty: 90 TABLET | Refills: 0 | Status: ON HOLD | OUTPATIENT
Start: 2022-03-29 | End: 2022-06-02

## 2022-03-30 ENCOUNTER — OFFICE VISIT (OUTPATIENT)
Dept: FAMILY MEDICINE CLINIC | Facility: CLINIC | Age: 62
End: 2022-03-30

## 2022-03-30 VITALS
SYSTOLIC BLOOD PRESSURE: 138 MMHG | WEIGHT: 231.5 LBS | RESPIRATION RATE: 18 BRPM | OXYGEN SATURATION: 98 % | BODY MASS INDEX: 31.36 KG/M2 | DIASTOLIC BLOOD PRESSURE: 82 MMHG | HEIGHT: 72 IN | HEART RATE: 96 BPM

## 2022-03-30 DIAGNOSIS — Z00.00 ANNUAL PHYSICAL EXAM: Primary | ICD-10-CM

## 2022-03-30 DIAGNOSIS — M25.522 LEFT ELBOW PAIN: ICD-10-CM

## 2022-03-30 DIAGNOSIS — M79.2 NEUROPATHIC PAIN: ICD-10-CM

## 2022-03-30 DIAGNOSIS — R01.1 MURMUR, CARDIAC: ICD-10-CM

## 2022-03-30 PROCEDURE — 99396 PREV VISIT EST AGE 40-64: CPT | Performed by: FAMILY MEDICINE

## 2022-03-30 RX ORDER — GABAPENTIN 100 MG/1
CAPSULE ORAL
Qty: 90 CAPSULE | Refills: 0 | Status: ON HOLD | OUTPATIENT
Start: 2022-03-30 | End: 2022-06-02

## 2022-03-30 NOTE — PROGRESS NOTES
Subjective   Ashley Bradley is a 61 y.o. male.     Chief Complaint   Patient presents with   • Annual Exam        History of Present Illness     here for annual exam---he thinks bs and bp are doing well ..  He notes left elbow pain for several mos    Current Outpatient Medications:   •  Continuous Blood Gluc  (FREESTYLE MAURI 14 DAY READER) device, 1 each Daily., Disp: 1 Device, Rfl: 0  •  Continuous Blood Gluc Sensor (FREESTYLE MAURI 14 DAY SENSOR) misc, 1 each Every 14 (Fourteen) Days., Disp: 1 each, Rfl: 4  •  diclofenac (VOLTAREN) 75 MG EC tablet, TAKE 1 TABLET BY MOUTH TWICE A DAY, Disp: 180 tablet, Rfl: 0  •  gabapentin (NEURONTIN) 100 MG capsule, TAKE 1 CAPSULE BY MOUTH THREE TIMES A DAY, Disp: 90 capsule, Rfl: 0  •  glucose blood test strip, onetouch test strips-dx e11.9- test twice daily, Disp: 100 each, Rfl: 12  •  lisinopril (PRINIVIL,ZESTRIL) 40 MG tablet, TAKE 1 TABLET BY MOUTH EVERY DAY, Disp: 90 tablet, Rfl: 0  •  metFORMIN ER (GLUCOPHAGE-XR) 500 MG 24 hr tablet, TAKE 2 TABLETS BY MOUTH DAILY WITH BREAKFAST., Disp: 180 tablet, Rfl: 0  •  ONETOUCH DELICA LANCETS FINE misc, Use to test blood sugar twice daily, Disp: 200 each, Rfl: 3  •  Semaglutide (Rybelsus) 7 MG tablet, Take 7 mg by mouth Daily., Disp: 90 tablet, Rfl: 1  Allergies   Allergen Reactions   • Penicillamine Unknown - High Severity   • Penicillins        Patient's Body mass index is 31.4 kg/m². indicating that he is obese (BMI >30). Obesity-related health conditions include the following: hypertension and diabetes mellitus. Obesity is unchanged. BMI is is above average; BMI management plan is completed. We discussed portion control and increasing exercise..      Past Medical History:   Diagnosis Date   • Diabetes mellitus (HCC)    • Diverticula of colon    • GERD (gastroesophageal reflux disease)    • Hypertension      Past Surgical History:   Procedure Laterality Date   • COLONOSCOPY  11/01/2010    snare 70, 60, rectum tics  "recall 5 years   • COLONOSCOPY Left 11/3/2016    Procedure: COLONOSCOPY WITH ANESTHESIA;  Surgeon: Lisandro Holt MD;  Location: [facility] ENDOSCOPY;  Service:    • HEMORRHOIDECTOMY     • HERNIA REPAIR     • TONSILLECTOMY         Review of Systems   Constitutional: Negative.    HENT: Negative.    Eyes: Negative.    Respiratory: Negative.    Cardiovascular: Negative.    Gastrointestinal: Negative.    Endocrine: Negative.    Genitourinary: Negative.    Musculoskeletal: Negative.    Skin: Negative.    Allergic/Immunologic: Negative.    Neurological: Negative.    Hematological: Negative.    Psychiatric/Behavioral: Negative.        Objective  /82 (BP Location: Left arm, Patient Position: Sitting)   Pulse 96   Resp 18   Ht 182.9 cm (72\")   Wt 105 kg (231 lb 8 oz)   SpO2 98%   BMI 31.40 kg/m²   Physical Exam  Vitals and nursing note reviewed.   Constitutional:       Appearance: Normal appearance. He is normal weight.   HENT:      Head: Normocephalic and atraumatic.      Nose: Nose normal.      Mouth/Throat:      Mouth: Mucous membranes are moist.      Pharynx: Oropharynx is clear.   Eyes:      Extraocular Movements: Extraocular movements intact.      Conjunctiva/sclera: Conjunctivae normal.      Pupils: Pupils are equal, round, and reactive to light.   Cardiovascular:      Rate and Rhythm: Normal rate and regular rhythm.      Pulses: Normal pulses.      Heart sounds: Murmur heard.   Pulmonary:      Effort: Pulmonary effort is normal.      Breath sounds: Normal breath sounds.   Abdominal:      General: Abdomen is flat. Bowel sounds are normal.      Palpations: Abdomen is soft.   Musculoskeletal:         General: Normal range of motion.      Cervical back: Normal range of motion and neck supple.   Skin:     General: Skin is warm.      Capillary Refill: Capillary refill takes less than 2 seconds.   Neurological:      General: No focal deficit present.      Mental Status: He is alert and oriented to person, place, " and time. Mental status is at baseline.   Psychiatric:         Mood and Affect: Mood normal.         Assessment/Plan   Diagnoses and all orders for this visit:    1. Annual physical exam (Primary)    2. Murmur, cardiac  -     Ambulatory Referral to Cardiology    3. Left elbow pain  -     Ambulatory Referral to Orthopedic Surgery    see form--              Orders Placed This Encounter   Procedures   • Ambulatory Referral to Cardiology     Referral Priority:   Routine     Referral Type:   Consultation     Referral Reason:   Specialty Services Required     Referred to Provider:   Earl Flood DO     Requested Specialty:   Cardiology     Number of Visits Requested:   1   • Ambulatory Referral to Orthopedic Surgery     Referral Priority:   Routine     Referral Type:   Consultation     Referral Reason:   Specialty Services Required     Requested Specialty:   Orthopedic Surgery     Number of Visits Requested:   1       Follow up: 4 month(s)

## 2022-04-05 ENCOUNTER — OFFICE VISIT (OUTPATIENT)
Dept: CARDIOLOGY | Facility: CLINIC | Age: 62
End: 2022-04-05

## 2022-04-05 VITALS
DIASTOLIC BLOOD PRESSURE: 82 MMHG | HEIGHT: 72 IN | HEART RATE: 87 BPM | WEIGHT: 230 LBS | BODY MASS INDEX: 31.15 KG/M2 | SYSTOLIC BLOOD PRESSURE: 120 MMHG | OXYGEN SATURATION: 99 %

## 2022-04-05 DIAGNOSIS — R06.09 DYSPNEA ON EXERTION: ICD-10-CM

## 2022-04-05 DIAGNOSIS — R01.1 HEART MURMUR: ICD-10-CM

## 2022-04-05 DIAGNOSIS — E11.9 TYPE 2 DIABETES MELLITUS WITHOUT COMPLICATION, WITHOUT LONG-TERM CURRENT USE OF INSULIN: ICD-10-CM

## 2022-04-05 DIAGNOSIS — R07.89 CHEST PAIN, ATYPICAL: Primary | ICD-10-CM

## 2022-04-05 DIAGNOSIS — I10 ESSENTIAL HYPERTENSION: ICD-10-CM

## 2022-04-05 DIAGNOSIS — E78.2 MIXED HYPERLIPIDEMIA: ICD-10-CM

## 2022-04-05 PROCEDURE — 99204 OFFICE O/P NEW MOD 45 MIN: CPT | Performed by: EMERGENCY MEDICINE

## 2022-04-05 PROCEDURE — 93000 ELECTROCARDIOGRAM COMPLETE: CPT | Performed by: EMERGENCY MEDICINE

## 2022-04-05 RX ORDER — ATORVASTATIN CALCIUM 40 MG/1
40 TABLET, FILM COATED ORAL DAILY
Qty: 90 TABLET | Refills: 3 | Status: SHIPPED | OUTPATIENT
Start: 2022-04-05 | End: 2022-07-29 | Stop reason: SDUPTHER

## 2022-04-09 PROBLEM — R01.1 HEART MURMUR: Status: ACTIVE | Noted: 2022-04-09

## 2022-04-10 NOTE — PROGRESS NOTES
[facility] - CARDIOLOGY  New Patient Initial Outpatient Evaulation    Primary Care Physician: Jian Rankin MD    Subjective     Chief Complaint   Patient presents with   • Heart Murmur     NEW PT        History of Present Illness    Patient is a very pleasant 62-year-old male with a past medical history of hypertension, hyperlipidemia, and diabetes and previous smoker who presents to the cardiology clinic today for initial evaluation.  [place] has a very physical job.  He was evaluated by Dr. Raknin last week who noticed that he had a new murmur for which she was referred to cardiology.  He denies any significant chest pain episodes but does admit to getting shortness of breath when he exerts himself.  He also gets episodes of dizziness when he gets overworked.  He denies any significant palpitations.  His current cardiac regimen consist of lisinopril 40 mg daily.  His blood pressure and heart rate are well controlled.  A recent lipid panel showed a total cholesterol of 199, triglyceride 252, HDL 25 and LDL of 125.      Review of Systems   Constitutional: Negative for diaphoresis, fever and malaise/fatigue.   HENT: Negative for congestion.    Eyes: Negative for vision loss in left eye and vision loss in right eye.   Cardiovascular: Positive for dyspnea on exertion. Negative for chest pain, claudication, irregular heartbeat, leg swelling, orthopnea, palpitations and syncope.   Respiratory: Negative for cough, shortness of breath and wheezing.    Hematologic/Lymphatic: Negative for adenopathy.   Skin: Negative for rash.   Musculoskeletal: Negative for joint pain and joint swelling.   Gastrointestinal: Negative for abdominal pain, diarrhea, nausea and vomiting.   Neurological: Positive for dizziness. Negative for excessive daytime sleepiness, focal weakness, light-headedness, numbness and weakness.   Psychiatric/Behavioral: Negative for depression. The patient does not have  insomnia.         Otherwise complete ROS reviewed and negative except as mentioned in the HPI.      Past Medical History:   Past Medical History:   Diagnosis Date   • Diabetes mellitus (HCC)    • Diverticula of colon    • GERD (gastroesophageal reflux disease)    • Hypertension        Past Surgical History:  Past Surgical History:   Procedure Laterality Date   • CARDIAC CATHETERIZATION     • COLONOSCOPY  11/01/2010    snare 70, 60, rectum tics recall 5 years   • COLONOSCOPY Left 11/03/2016    Procedure: COLONOSCOPY WITH ANESTHESIA;  Surgeon: Lisandro Holt MD;  Location: [facility] ENDOSCOPY;  Service:    • HEMORRHOIDECTOMY     • HERNIA REPAIR     • TONSILLECTOMY         Family History: family history includes Heart murmur in his father; Ulcerative colitis in his mother; Valvular heart disease in his mother.    Social History:  reports that he has quit smoking. His smoking use included cigarettes. He has a 10.00 pack-year smoking history. His smokeless tobacco use includes snuff. He reports that he does not drink alcohol and does not use drugs.    Medications:  Prior to Admission medications    Medication Sig Start Date End Date Taking? Authorizing Provider   Continuous Blood Gluc  (FREESTYLE MAURI 14 DAY READER) device 1 each Daily. 11/8/19  Yes Jian Rankin MD   Continuous Blood Gluc Sensor (FREESTYLE MAURI 14 DAY SENSOR) misc 1 each Every 14 (Fourteen) Days. 11/8/19  Yes Jian Rankin MD   diclofenac (VOLTAREN) 75 MG EC tablet TAKE 1 TABLET BY MOUTH TWICE A DAY 3/29/22  Yes Jian Rankin MD   gabapentin (NEURONTIN) 100 MG capsule TAKE 1 CAPSULE BY MOUTH THREE TIMES A DAY 3/30/22  Yes Jian Rankin MD   glucose blood test strip onetouch test strips-dx e11.9- test twice daily 10/25/21  Yes Jian Rankin MD   lisinopril (PRINIVIL,ZESTRIL) 40 MG tablet TAKE 1 TABLET BY MOUTH EVERY DAY 3/29/22  Yes Jian Rankin MD   metFORMIN ER (GLUCOPHAGE-XR) 500 MG 24 hr  "tablet TAKE 2 TABLETS BY MOUTH DAILY WITH BREAKFAST. 3/29/22  Yes Jian Rankin MD   ONETOUCH DELICA LANCETS FINE misc Use to test blood sugar twice daily 4/9/19  Yes Jian Rankin MD   Semaglutide (Rybelsus) 7 MG tablet Take 7 mg by mouth Daily. 12/30/21  Yes Jian Rankin MD   atorvastatin (LIPITOR) 40 MG tablet Take 1 tablet by mouth Daily. 4/5/22   Earl Flood DO     Allergies:  Allergies   Allergen Reactions   • Penicillamine Unknown - High Severity   • Penicillins        Objective     Vital Signs: /82   Pulse 87   Ht 182.9 cm (72.01\")   Wt 104 kg (230 lb)   SpO2 99%   BMI 31.19 kg/m²     Vitals and nursing note reviewed.   Constitutional:       Appearance: Normal and healthy appearance. Well-developed and not in distress.   Eyes:      Extraocular Movements: Extraocular movements intact.      Pupils: Pupils are equal, round, and reactive to light.   HENT:      Head: Normocephalic and atraumatic.    Mouth/Throat:      Pharynx: Oropharynx is clear.   Neck:      Vascular: JVD normal.      Trachea: Trachea normal.   Pulmonary:      Effort: Pulmonary effort is normal.      Breath sounds: Normal breath sounds. No wheezing. No rhonchi. No rales.   Cardiovascular:      PMI at left midclavicular line. Normal rate. Regular rhythm. Normal S1. Normal S2.      Murmurs: There is a grade 2/6 systolic murmur.      No gallop. No click. No rub.   Pulses:     Dorsalis pedis: 2+ bilaterally.     Posterior tibial: 2+ bilaterally.  Abdominal:      General: Bowel sounds are normal.      Palpations: Abdomen is soft.      Tenderness: There is no abdominal tenderness.   Musculoskeletal: Normal range of motion.      Cervical back: Normal range of motion and neck supple. Skin:     General: Skin is warm and dry.      Capillary Refill: Capillary refill takes less than 2 seconds.   Feet:      Right foot:      Skin integrity: Skin integrity normal.      Left foot:      Skin integrity: Skin " integrity normal.   Neurological:      Mental Status: Alert and oriented to person, place and time.      Cranial Nerves: Cranial nerves are intact.      Sensory: Sensation is intact.      Motor: Motor function is intact.      Coordination: Coordination is intact.   Psychiatric:         Speech: Speech normal.         Behavior: Behavior is cooperative.         Results Reviewed:      ECG 12 Lead    Date/Time: 4/9/2022 9:41 PM  Performed by: Earl Flood DO  Authorized by: Earl Flood DO   Comparison: compared with previous ECG   Similar to previous ECG  Rhythm: sinus rhythm  Rate: normal  Conduction: conduction normal  ST Segments: ST segments normal  T Waves: T waves normal  QRS axis: normal  Other: no other findings    Clinical impression: normal ECG              Lab Results   Component Value Date    TRIG 252 (H) 08/27/2021    HDL 29 (L) 08/27/2021    VLDL 45 (H) 08/27/2021     Lab Results   Component Value Date    HGBA1C 8.5 08/31/2021       Assessment / Plan        Problem List Items Addressed This Visit        Cardiac and Vasculature    Essential hypertension    Mixed hyperlipidemia    Relevant Medications    atorvastatin (LIPITOR) 40 MG tablet    Heart murmur       Endocrine and Metabolic    Type 2 diabetes mellitus without complication, without long-term current use of insulin (HCC)      Other Visit Diagnoses     Chest pain, atypical    -  Primary    Relevant Orders    Stress Test With Myocardial Perfusion (1 Day)    Dyspnea on exertion        Relevant Orders    Adult Transthoracic Echo Complete W/ Cont if Necessary Per Protocol          Plan:    62-year-old male with a history of hypertension, hyperlipidemia and diabetes presents today for initial evaluation.  He has not having any specific chest pain but does have significant dyspnea on exertion with episodes of dizziness.  Based off of his lipid panel and ASCVD risk core, we will start the patient on high intensity statin with  Lipitor 40 mg daily.    We will also schedule him for a stress test to restratify him for possible coronary artery disease.  We will also get a transthoracic echocardiogram to further assess this new murmur recently discovered.    Follow-up with cardiology in 6 weeks or sooner if necessary    Thank you Dr. Rankin for this referral.  Please call or text me with any questions at any time.  My cell phone number [phone].      Earl Flood, DO    Interventional cardiology  [facility]  04/09/22   21:44 CDT

## 2022-05-18 ENCOUNTER — HOSPITAL ENCOUNTER (OUTPATIENT)
Dept: CARDIOLOGY | Facility: HOSPITAL | Age: 62
Discharge: HOME | End: 2022-05-18
Admitting: EMERGENCY MEDICINE

## 2022-05-18 ENCOUNTER — HOSPITAL ENCOUNTER (OUTPATIENT)
Dept: CARDIOLOGY | Facility: HOSPITAL | Age: 62
Discharge: HOME | End: 2022-05-18

## 2022-05-18 VITALS
WEIGHT: 230 LBS | HEART RATE: 68 BPM | HEIGHT: 72 IN | BODY MASS INDEX: 31.15 KG/M2 | DIASTOLIC BLOOD PRESSURE: 67 MMHG | SYSTOLIC BLOOD PRESSURE: 130 MMHG

## 2022-05-18 VITALS
HEIGHT: 72 IN | SYSTOLIC BLOOD PRESSURE: 120 MMHG | BODY MASS INDEX: 31.15 KG/M2 | DIASTOLIC BLOOD PRESSURE: 82 MMHG | WEIGHT: 230 LBS

## 2022-05-18 DIAGNOSIS — R06.09 DYSPNEA ON EXERTION: ICD-10-CM

## 2022-05-18 DIAGNOSIS — R07.89 CHEST PAIN, ATYPICAL: ICD-10-CM

## 2022-05-18 PROCEDURE — 25010000002 REGADENOSON 0.4 MG/5ML SOLUTION: Performed by: EMERGENCY MEDICINE

## 2022-05-18 PROCEDURE — 93306 TTE W/DOPPLER COMPLETE: CPT | Performed by: EMERGENCY MEDICINE

## 2022-05-18 PROCEDURE — 78452 HT MUSCLE IMAGE SPECT MULT: CPT | Performed by: EMERGENCY MEDICINE

## 2022-05-18 PROCEDURE — 93017 CV STRESS TEST TRACING ONLY: CPT

## 2022-05-18 PROCEDURE — A9500 TC99M SESTAMIBI: HCPCS | Performed by: EMERGENCY MEDICINE

## 2022-05-18 PROCEDURE — 0 TECHNETIUM SESTAMIBI: Performed by: EMERGENCY MEDICINE

## 2022-05-18 PROCEDURE — 93306 TTE W/DOPPLER COMPLETE: CPT

## 2022-05-18 PROCEDURE — 93018 CV STRESS TEST I&R ONLY: CPT | Performed by: EMERGENCY MEDICINE

## 2022-05-18 PROCEDURE — 78452 HT MUSCLE IMAGE SPECT MULT: CPT

## 2022-05-18 RX ADMIN — TECHNETIUM TC 99M SESTAMIBI 1 DOSE: 1 INJECTION INTRAVENOUS at 08:40

## 2022-05-18 RX ADMIN — TECHNETIUM TC 99M SESTAMIBI 1 DOSE: 1 INJECTION INTRAVENOUS at 10:00

## 2022-05-18 RX ADMIN — REGADENOSON 0.4 MG: 0.08 INJECTION, SOLUTION INTRAVENOUS at 09:53

## 2022-05-19 LAB
BH CV ECHO MEAS - AO MAX PG: 23.4 MMHG
BH CV ECHO MEAS - AO MEAN PG: 12.5 MMHG
BH CV ECHO MEAS - AO ROOT DIAM: 3 CM
BH CV ECHO MEAS - AO V2 MAX: 242 CM/SEC
BH CV ECHO MEAS - AO V2 VTI: 50.5 CM
BH CV ECHO MEAS - AVA(I,D): 1.95 CM2
BH CV ECHO MEAS - EDV(CUBED): 65.9 ML
BH CV ECHO MEAS - EDV(MOD-SP4): 69.7 ML
BH CV ECHO MEAS - EF(MOD-SP4): 60.4 %
BH CV ECHO MEAS - ESV(CUBED): 18.4 ML
BH CV ECHO MEAS - ESV(MOD-SP4): 27.6 ML
BH CV ECHO MEAS - FS: 34.7 %
BH CV ECHO MEAS - IVS/LVPW: 0.94 CM
BH CV ECHO MEAS - IVSD: 0.87 CM
BH CV ECHO MEAS - LA DIMENSION: 2.7 CM
BH CV ECHO MEAS - LAT PEAK E' VEL: 7.9 CM/SEC
BH CV ECHO MEAS - LV DIASTOLIC VOL/BSA (35-75): 30.8 CM2
BH CV ECHO MEAS - LV MASS(C)D: 110.4 GRAMS
BH CV ECHO MEAS - LV MAX PG: 4.7 MMHG
BH CV ECHO MEAS - LV MEAN PG: 3 MMHG
BH CV ECHO MEAS - LV SYSTOLIC VOL/BSA (12-30): 12.2 CM2
BH CV ECHO MEAS - LV V1 MAX: 108 CM/SEC
BH CV ECHO MEAS - LV V1 VTI: 23.7 CM
BH CV ECHO MEAS - LVIDD: 4 CM
BH CV ECHO MEAS - LVIDS: 2.6 CM
BH CV ECHO MEAS - LVOT AREA: 4.2 CM2
BH CV ECHO MEAS - LVOT DIAM: 2.3 CM
BH CV ECHO MEAS - LVPWD: 0.92 CM
BH CV ECHO MEAS - MED PEAK E' VEL: 7.4 CM/SEC
BH CV ECHO MEAS - MV A MAX VEL: 92.2 CM/SEC
BH CV ECHO MEAS - MV DEC TIME: 0.21 MSEC
BH CV ECHO MEAS - MV E MAX VEL: 85.9 CM/SEC
BH CV ECHO MEAS - MV E/A: 0.93
BH CV ECHO MEAS - SI(MOD-SP4): 18.6 ML/M2
BH CV ECHO MEAS - SV(LVOT): 98.5 ML
BH CV ECHO MEAS - SV(MOD-SP4): 42.1 ML
BH CV ECHO MEASUREMENTS AVERAGE E/E' RATIO: 11.23
LEFT ATRIUM VOLUME INDEX: 23 ML/M2
LEFT ATRIUM VOLUME: 52 CM3
MAXIMAL PREDICTED HEART RATE: 157 BPM
STRESS TARGET HR: 133 BPM

## 2022-05-20 ENCOUNTER — OFFICE VISIT (OUTPATIENT)
Dept: CARDIOLOGY | Facility: CLINIC | Age: 62
End: 2022-05-20

## 2022-05-20 VITALS
OXYGEN SATURATION: 99 % | HEIGHT: 72 IN | WEIGHT: 230 LBS | BODY MASS INDEX: 31.15 KG/M2 | SYSTOLIC BLOOD PRESSURE: 124 MMHG | HEART RATE: 82 BPM | DIASTOLIC BLOOD PRESSURE: 76 MMHG

## 2022-05-20 DIAGNOSIS — F17.200 SMOKER: ICD-10-CM

## 2022-05-20 DIAGNOSIS — I10 ESSENTIAL HYPERTENSION: ICD-10-CM

## 2022-05-20 DIAGNOSIS — N18.2 CHRONIC KIDNEY DISEASE, STAGE II (MILD): ICD-10-CM

## 2022-05-20 DIAGNOSIS — E11.9 TYPE 2 DIABETES MELLITUS WITHOUT COMPLICATION, WITHOUT LONG-TERM CURRENT USE OF INSULIN: ICD-10-CM

## 2022-05-20 DIAGNOSIS — E78.2 MIXED HYPERLIPIDEMIA: ICD-10-CM

## 2022-05-20 DIAGNOSIS — R94.39 ABNORMAL NUCLEAR STRESS TEST: Primary | ICD-10-CM

## 2022-05-20 DIAGNOSIS — R01.1 HEART MURMUR: ICD-10-CM

## 2022-05-20 LAB
BH CV REST NUCLEAR ISOTOPE DOSE: 11 MCI
BH CV STRESS BP STAGE 1: NORMAL
BH CV STRESS COMMENTS STAGE 1: NORMAL
BH CV STRESS DOSE REGADENOSON STAGE 1: 0.4
BH CV STRESS DURATION MIN STAGE 1: 0
BH CV STRESS DURATION SEC STAGE 1: 10
BH CV STRESS HR STAGE 1: 88
BH CV STRESS NUCLEAR ISOTOPE DOSE: 34.5 MCI
BH CV STRESS PROTOCOL 1: NORMAL
BH CV STRESS RECOVERY BP: NORMAL MMHG
BH CV STRESS RECOVERY HR: 78 BPM
BH CV STRESS STAGE 1: 1
MAXIMAL PREDICTED HEART RATE: 157 BPM
PERCENT MAX PREDICTED HR: 56.05 %
STRESS BASELINE BP: NORMAL MMHG
STRESS BASELINE HR: 68 BPM
STRESS PERCENT HR: 66 %
STRESS POST EXERCISE DUR SEC: 10 SEC
STRESS POST PEAK BP: NORMAL MMHG
STRESS POST PEAK HR: 88 BPM
STRESS TARGET HR: 133 BPM

## 2022-05-20 PROCEDURE — 99214 OFFICE O/P EST MOD 30 MIN: CPT | Performed by: EMERGENCY MEDICINE

## 2022-05-20 RX ORDER — NITROGLYCERIN 0.4 MG/1
TABLET SUBLINGUAL
Qty: 30 TABLET | Refills: 3 | Status: SHIPPED | OUTPATIENT
Start: 2022-05-20 | End: 2022-08-03 | Stop reason: SDUPTHER

## 2022-05-20 RX ORDER — METOPROLOL SUCCINATE 25 MG/1
25 TABLET, EXTENDED RELEASE ORAL DAILY
Qty: 30 TABLET | Refills: 11 | Status: SHIPPED | OUTPATIENT
Start: 2022-05-20

## 2022-05-20 NOTE — PROGRESS NOTES
Subjective:     Encounter Date:05/20/2022      Patient ID: Ashley Bradley is a 62 y.o. male.    Chief Complaint:  History of Present Illness    Patient is a 62-year-old male who is a 2-month follow-up today.  He has a history of hypertension and hyperlipidemia as well as diabetes.  He underwent a stress test and transthoracic echocardiogram after his initial visit in which she complained of chest pain and dyspnea on exertion.  The echocardiogram showed normal systolic and normal diastolic function.  Trace aortic stenosis was the only valvular abnormality and likely the cause for the murmur.    Stress test showed evidence of ischemia in the inferior distribution.  Overall, the stress test was felt to be intermediate to high risk and further cardiac work-up was recommended.    Patient states he continues to have the same symptoms he complained of 2 months ago including the chest pain and pressure on exertion as well as the shortness of breath when he is working.    Review of Systems   Constitutional: Positive for malaise/fatigue. Negative for diaphoresis and fever.   HENT: Negative for congestion.    Eyes: Negative for vision loss in left eye and vision loss in right eye.   Cardiovascular: Positive for chest pain and dyspnea on exertion. Negative for claudication, irregular heartbeat, leg swelling, orthopnea, palpitations and syncope.   Respiratory: Positive for shortness of breath. Negative for cough and wheezing.    Hematologic/Lymphatic: Negative for adenopathy.   Skin: Negative for rash.   Musculoskeletal: Negative for joint pain and joint swelling.   Gastrointestinal: Negative for abdominal pain, diarrhea, nausea and vomiting.   Neurological: Negative for excessive daytime sleepiness, dizziness, focal weakness, light-headedness, numbness and weakness.   Psychiatric/Behavioral: Negative for depression. The patient does not have insomnia.            Current Outpatient Medications:   •  atorvastatin (LIPITOR)  40 MG tablet, Take 1 tablet by mouth Daily., Disp: 90 tablet, Rfl: 3  •  Continuous Blood Gluc  (FREESTYLE MAURI 14 DAY READER) device, 1 each Daily., Disp: 1 Device, Rfl: 0  •  Continuous Blood Gluc Sensor (FREESTYLE MAURI 14 DAY SENSOR) misc, 1 each Every 14 (Fourteen) Days., Disp: 1 each, Rfl: 4  •  diclofenac (VOLTAREN) 75 MG EC tablet, TAKE 1 TABLET BY MOUTH TWICE A DAY, Disp: 180 tablet, Rfl: 0  •  gabapentin (NEURONTIN) 100 MG capsule, TAKE 1 CAPSULE BY MOUTH THREE TIMES A DAY, Disp: 90 capsule, Rfl: 0  •  glucose blood test strip, onetouch test strips-dx e11.9- test twice daily, Disp: 100 each, Rfl: 12  •  lisinopril (PRINIVIL,ZESTRIL) 40 MG tablet, TAKE 1 TABLET BY MOUTH EVERY DAY, Disp: 90 tablet, Rfl: 0  •  metFORMIN ER (GLUCOPHAGE-XR) 500 MG 24 hr tablet, TAKE 2 TABLETS BY MOUTH DAILY WITH BREAKFAST., Disp: 180 tablet, Rfl: 0  •  ONETOUCH DELICA LANCETS FINE misc, Use to test blood sugar twice daily, Disp: 200 each, Rfl: 3  •  Semaglutide (Rybelsus) 7 MG tablet, Take 7 mg by mouth Daily., Disp: 90 tablet, Rfl: 1  •  metoprolol succinate XL (TOPROL-XL) 25 MG 24 hr tablet, Take 1 tablet by mouth Daily., Disp: 30 tablet, Rfl: 11  •  nitroglycerin (NITROSTAT) 0.4 MG SL tablet, 1 under the tongue as needed for angina, may repeat q5mins for up three doses, Disp: 30 tablet, Rfl: 3       Objective:      Vitals:    05/20/22 1009   BP: 124/76   Pulse: 82   SpO2: 99%     Vitals and nursing note reviewed.   Constitutional:       Appearance: Normal and healthy appearance. Well-developed and not in distress.   Eyes:      Extraocular Movements: Extraocular movements intact.      Pupils: Pupils are equal, round, and reactive to light.   HENT:      Head: Normocephalic and atraumatic.    Mouth/Throat:      Pharynx: Oropharynx is clear.   Neck:      Vascular: JVD normal.      Trachea: Trachea normal.   Pulmonary:      Effort: Pulmonary effort is normal.      Breath sounds: Normal breath sounds. No wheezing. No  rhonchi. No rales.   Cardiovascular:      PMI at left midclavicular line. Normal rate. Regular rhythm. Normal S1. Normal S2.      Murmurs: There is a grade 2/6 systolic murmur.      No gallop. No click. No rub.   Pulses:     Dorsalis pedis: 2+ bilaterally.     Posterior tibial: 2+ bilaterally.  Abdominal:      General: Bowel sounds are normal.      Palpations: Abdomen is soft.      Tenderness: There is no abdominal tenderness.   Musculoskeletal: Normal range of motion.      Cervical back: Normal range of motion and neck supple. Skin:     General: Skin is warm and dry.      Capillary Refill: Capillary refill takes less than 2 seconds.   Feet:      Right foot:      Skin integrity: Skin integrity normal.      Left foot:      Skin integrity: Skin integrity normal.   Neurological:      Mental Status: Alert and oriented to person, place and time.      Cranial Nerves: Cranial nerves are intact.      Sensory: Sensation is intact.      Motor: Motor function is intact.      Coordination: Coordination is intact.   Psychiatric:         Speech: Speech normal.         Behavior: Behavior is cooperative.         Lab Review:       Procedures      Assessment/Plan:     Problem List Items Addressed This Visit        Cardiac and Vasculature    Essential hypertension    Relevant Medications    metoprolol succinate XL (TOPROL-XL) 25 MG 24 hr tablet    Mixed hyperlipidemia    Heart murmur    Abnormal nuclear stress test - Primary    Overview     Added automatically from request for surgery [id number]           Relevant Orders    Case Request Cath Lab: Left Heart  WITH POSS INTERVENTION, RIGHT RADIAL, US GUIDED (Completed)       Endocrine and Metabolic    Type 2 diabetes mellitus without complication, without long-term current use of insulin (HCC)       Genitourinary and Reproductive     Chronic kidney disease, stage II (mild)       Tobacco    Smoker            Recommendations/plans:      Based off patient's symptoms and results of his nuclear  stress test, we will schedule him for a diagnostic left heart catheterization with possible intervention based on findings.    We will also start him on sublingual nitroglycerin as needed as well as beta-blocker therapy with Toprol-XL 25 mg daily for his symptoms.  Continue on current dose of Lipitor, lisinopril.    Follow-up with cardiology in 1 month to continue optimizing his medical regimen    Earl Flood, DO   Interventional cardiology  [facility]  05/20/2022  12:21 CDT

## 2022-05-23 ENCOUNTER — HOSPITAL ENCOUNTER (INPATIENT)
Facility: HOSPITAL | Age: 62
LOS: 2 days | Discharge: HOME | End: 2022-05-25
Attending: EMERGENCY MEDICINE | Admitting: EMERGENCY MEDICINE

## 2022-05-23 DIAGNOSIS — R94.39 ABNORMAL NUCLEAR STRESS TEST: ICD-10-CM

## 2022-05-23 DIAGNOSIS — I25.110 CORONARY ARTERY DISEASE INVOLVING NATIVE CORONARY ARTERY OF NATIVE HEART WITH UNSTABLE ANGINA PECTORIS: Primary | ICD-10-CM

## 2022-05-23 PROBLEM — I25.10 CAD (CORONARY ARTERY DISEASE): Status: ACTIVE | Noted: 2022-05-23

## 2022-05-23 LAB
ANION GAP SERPL CALCULATED.3IONS-SCNC: 10 MMOL/L (ref 5–15)
BUN SERPL-MCNC: 12 MG/DL (ref 8–23)
BUN/CREAT SERPL: 12.4 (ref 7–25)
CALCIUM SPEC-SCNC: 9.7 MG/DL (ref 8.6–10.5)
CHLORIDE SERPL-SCNC: 101 MMOL/L (ref 98–107)
CO2 SERPL-SCNC: 26 MMOL/L (ref 22–29)
CREAT SERPL-MCNC: 0.97 MG/DL (ref 0.76–1.27)
DEPRECATED RDW RBC AUTO: 40.7 FL (ref 37–54)
EGFRCR SERPLBLD CKD-EPI 2021: 87.7 ML/MIN/1.73
ERYTHROCYTE [DISTWIDTH] IN BLOOD BY AUTOMATED COUNT: 11.8 % (ref 12.3–15.4)
GLUCOSE SERPL-MCNC: 155 MG/DL (ref 65–99)
HCT VFR BLD AUTO: 47.4 % (ref 37.5–51)
HGB BLD-MCNC: 15.9 G/DL (ref 13–17.7)
INR PPP: 1.15 (ref 0.91–1.09)
MCH RBC QN AUTO: 31.9 PG (ref 26.6–33)
MCHC RBC AUTO-ENTMCNC: 33.5 G/DL (ref 31.5–35.7)
MCV RBC AUTO: 95.2 FL (ref 79–97)
PLATELET # BLD AUTO: 241 10*3/MM3 (ref 140–450)
PMV BLD AUTO: 12.3 FL (ref 6–12)
POTASSIUM SERPL-SCNC: 5 MMOL/L (ref 3.5–5.2)
PROTHROMBIN TIME: 14.3 SECONDS (ref 11.9–14.6)
RBC # BLD AUTO: 4.98 10*6/MM3 (ref 4.14–5.8)
SODIUM SERPL-SCNC: 137 MMOL/L (ref 136–145)
WBC NRBC COR # BLD: 8.9 10*3/MM3 (ref 3.4–10.8)

## 2022-05-23 PROCEDURE — S0260 H&P FOR SURGERY: HCPCS | Performed by: EMERGENCY MEDICINE

## 2022-05-23 PROCEDURE — 80048 BASIC METABOLIC PNL TOTAL CA: CPT | Performed by: EMERGENCY MEDICINE

## 2022-05-23 PROCEDURE — C1887 CATHETER, GUIDING: HCPCS | Performed by: EMERGENCY MEDICINE

## 2022-05-23 PROCEDURE — B2111ZZ FLUOROSCOPY OF MULTIPLE CORONARY ARTERIES USING LOW OSMOLAR CONTRAST: ICD-10-PCS | Performed by: EMERGENCY MEDICINE

## 2022-05-23 PROCEDURE — 02703ZZ DILATION OF CORONARY ARTERY, ONE ARTERY, PERCUTANEOUS APPROACH: ICD-10-PCS | Performed by: EMERGENCY MEDICINE

## 2022-05-23 PROCEDURE — G0378 HOSPITAL OBSERVATION PER HR: HCPCS

## 2022-05-23 PROCEDURE — C1725 CATH, TRANSLUMIN NON-LASER: HCPCS | Performed by: EMERGENCY MEDICINE

## 2022-05-23 PROCEDURE — 93005 ELECTROCARDIOGRAM TRACING: CPT | Performed by: EMERGENCY MEDICINE

## 2022-05-23 PROCEDURE — 85610 PROTHROMBIN TIME: CPT | Performed by: EMERGENCY MEDICINE

## 2022-05-23 PROCEDURE — 25010000002 HEPARIN (PORCINE) 1000-0.9 UT/500ML-% SOLUTION: Performed by: EMERGENCY MEDICINE

## 2022-05-23 PROCEDURE — C1874 STENT, COATED/COV W/DEL SYS: HCPCS | Performed by: EMERGENCY MEDICINE

## 2022-05-23 PROCEDURE — 99152 MOD SED SAME PHYS/QHP 5/>YRS: CPT | Performed by: EMERGENCY MEDICINE

## 2022-05-23 PROCEDURE — 85347 COAGULATION TIME ACTIVATED: CPT

## 2022-05-23 PROCEDURE — 25010000002 DIPHENHYDRAMINE PER 50 MG: Performed by: EMERGENCY MEDICINE

## 2022-05-23 PROCEDURE — 92920 PRQ TRLUML C ANGIOP 1ART&/BR: CPT | Performed by: EMERGENCY MEDICINE

## 2022-05-23 PROCEDURE — C1769 GUIDE WIRE: HCPCS | Performed by: EMERGENCY MEDICINE

## 2022-05-23 PROCEDURE — C9600 PERC DRUG-EL COR STENT SING: HCPCS | Performed by: EMERGENCY MEDICINE

## 2022-05-23 PROCEDURE — 25010000002 FENTANYL CITRATE (PF) 50 MCG/ML SOLUTION: Performed by: EMERGENCY MEDICINE

## 2022-05-23 PROCEDURE — 93458 L HRT ARTERY/VENTRICLE ANGIO: CPT | Performed by: EMERGENCY MEDICINE

## 2022-05-23 PROCEDURE — 02JA3ZZ INSPECTION OF HEART, PERCUTANEOUS APPROACH: ICD-10-PCS | Performed by: EMERGENCY MEDICINE

## 2022-05-23 PROCEDURE — 25010000002 HEPARIN (PORCINE) 2000-0.9 UNIT/L-% SOLUTION: Performed by: EMERGENCY MEDICINE

## 2022-05-23 PROCEDURE — 99153 MOD SED SAME PHYS/QHP EA: CPT | Performed by: EMERGENCY MEDICINE

## 2022-05-23 PROCEDURE — 25010000002 HEPARIN (PORCINE) PER 1000 UNITS: Performed by: EMERGENCY MEDICINE

## 2022-05-23 PROCEDURE — 85027 COMPLETE CBC AUTOMATED: CPT | Performed by: EMERGENCY MEDICINE

## 2022-05-23 PROCEDURE — 93010 ELECTROCARDIOGRAM REPORT: CPT | Performed by: EMERGENCY MEDICINE

## 2022-05-23 PROCEDURE — 4A023N7 MEASUREMENT OF CARDIAC SAMPLING AND PRESSURE, LEFT HEART, PERCUTANEOUS APPROACH: ICD-10-PCS | Performed by: EMERGENCY MEDICINE

## 2022-05-23 PROCEDURE — C1894 INTRO/SHEATH, NON-LASER: HCPCS | Performed by: EMERGENCY MEDICINE

## 2022-05-23 PROCEDURE — 0 IOPAMIDOL PER 1 ML: Performed by: EMERGENCY MEDICINE

## 2022-05-23 PROCEDURE — 92928 PRQ TCAT PLMT NTRAC ST 1 LES: CPT | Performed by: EMERGENCY MEDICINE

## 2022-05-23 PROCEDURE — 027034Z DILATION OF CORONARY ARTERY, ONE ARTERY WITH DRUG-ELUTING INTRALUMINAL DEVICE, PERCUTANEOUS APPROACH: ICD-10-PCS | Performed by: EMERGENCY MEDICINE

## 2022-05-23 PROCEDURE — 25010000002 MIDAZOLAM HCL (PF) 5 MG/5ML SOLUTION: Performed by: EMERGENCY MEDICINE

## 2022-05-23 DEVICE — XIENCE SKYPOINT™ EVEROLIMUS ELUTING CORONARY STENT SYSTEM 2.50 MM X 18 MM / RAPID-EXCHANGE
Type: IMPLANTABLE DEVICE | Site: CORONARY | Status: FUNCTIONAL
Brand: XIENCE SKYPOINT™

## 2022-05-23 RX ORDER — SODIUM CHLORIDE 0.9 % (FLUSH) 0.9 %
10 SYRINGE (ML) INJECTION EVERY 12 HOURS SCHEDULED
Status: DISCONTINUED | OUTPATIENT
Start: 2022-05-23 | End: 2022-05-23 | Stop reason: HOSPADM

## 2022-05-23 RX ORDER — SODIUM CHLORIDE 9 MG/ML
100 INJECTION, SOLUTION INTRAVENOUS CONTINUOUS
Status: DISCONTINUED | OUTPATIENT
Start: 2022-05-23 | End: 2022-05-25 | Stop reason: HOSPADM

## 2022-05-23 RX ORDER — ASPIRIN 81 MG/1
81 TABLET ORAL DAILY
Status: DISCONTINUED | OUTPATIENT
Start: 2022-05-24 | End: 2022-05-25 | Stop reason: HOSPADM

## 2022-05-23 RX ORDER — SODIUM CHLORIDE 0.9 % (FLUSH) 0.9 %
10 SYRINGE (ML) INJECTION AS NEEDED
Status: DISCONTINUED | OUTPATIENT
Start: 2022-05-23 | End: 2022-05-23 | Stop reason: HOSPADM

## 2022-05-23 RX ORDER — HEPARIN SODIUM 1000 [USP'U]/ML
INJECTION, SOLUTION INTRAVENOUS; SUBCUTANEOUS AS NEEDED
Status: DISCONTINUED | OUTPATIENT
Start: 2022-05-23 | End: 2022-05-23 | Stop reason: HOSPADM

## 2022-05-23 RX ORDER — ONDANSETRON 2 MG/ML
4 INJECTION INTRAMUSCULAR; INTRAVENOUS EVERY 6 HOURS PRN
Status: DISCONTINUED | OUTPATIENT
Start: 2022-05-23 | End: 2022-05-25 | Stop reason: HOSPADM

## 2022-05-23 RX ORDER — GABAPENTIN 100 MG/1
100 CAPSULE ORAL 3 TIMES DAILY
Status: DISCONTINUED | OUTPATIENT
Start: 2022-05-23 | End: 2022-05-25 | Stop reason: HOSPADM

## 2022-05-23 RX ORDER — SODIUM CHLORIDE 9 MG/ML
5 INJECTION, SOLUTION INTRAVENOUS CONTINUOUS
Status: DISCONTINUED | OUTPATIENT
Start: 2022-05-23 | End: 2022-05-23

## 2022-05-23 RX ORDER — METOPROLOL SUCCINATE 25 MG/1
25 TABLET, EXTENDED RELEASE ORAL DAILY
Status: DISCONTINUED | OUTPATIENT
Start: 2022-05-24 | End: 2022-05-25 | Stop reason: HOSPADM

## 2022-05-23 RX ORDER — ACETAMINOPHEN 325 MG/1
650 TABLET ORAL EVERY 4 HOURS PRN
Status: DISCONTINUED | OUTPATIENT
Start: 2022-05-23 | End: 2022-05-25 | Stop reason: HOSPADM

## 2022-05-23 RX ORDER — ONDANSETRON 4 MG/1
4 TABLET, FILM COATED ORAL EVERY 6 HOURS PRN
Status: DISCONTINUED | OUTPATIENT
Start: 2022-05-23 | End: 2022-05-25 | Stop reason: HOSPADM

## 2022-05-23 RX ORDER — NITROGLYCERIN 20 MG/100ML
5-200 INJECTION INTRAVENOUS
Status: DISCONTINUED | OUTPATIENT
Start: 2022-05-23 | End: 2022-05-23 | Stop reason: SDUPTHER

## 2022-05-23 RX ORDER — NITROGLYCERIN 5 MG/ML
INJECTION, SOLUTION INTRAVENOUS AS NEEDED
Status: DISCONTINUED | OUTPATIENT
Start: 2022-05-23 | End: 2022-05-23 | Stop reason: HOSPADM

## 2022-05-23 RX ORDER — HEPARIN SODIUM 200 [USP'U]/100ML
INJECTION, SOLUTION INTRAVENOUS AS NEEDED
Status: DISCONTINUED | OUTPATIENT
Start: 2022-05-23 | End: 2022-05-23 | Stop reason: HOSPADM

## 2022-05-23 RX ORDER — LISINOPRIL 20 MG/1
40 TABLET ORAL DAILY
Status: DISCONTINUED | OUTPATIENT
Start: 2022-05-24 | End: 2022-05-25 | Stop reason: HOSPADM

## 2022-05-23 RX ORDER — LIDOCAINE HYDROCHLORIDE 20 MG/ML
INJECTION, SOLUTION INFILTRATION; PERINEURAL AS NEEDED
Status: DISCONTINUED | OUTPATIENT
Start: 2022-05-23 | End: 2022-05-23 | Stop reason: HOSPADM

## 2022-05-23 RX ORDER — SODIUM CHLORIDE 9 MG/ML
1-3 INJECTION, SOLUTION INTRAVENOUS CONTINUOUS
Status: DISCONTINUED | OUTPATIENT
Start: 2022-05-23 | End: 2022-05-23

## 2022-05-23 RX ORDER — NITROGLYCERIN 20 MG/100ML
10-50 INJECTION INTRAVENOUS
Status: DISCONTINUED | OUTPATIENT
Start: 2022-05-23 | End: 2022-05-25 | Stop reason: HOSPADM

## 2022-05-23 RX ORDER — FENTANYL CITRATE 50 UG/ML
INJECTION, SOLUTION INTRAMUSCULAR; INTRAVENOUS AS NEEDED
Status: DISCONTINUED | OUTPATIENT
Start: 2022-05-23 | End: 2022-05-23 | Stop reason: HOSPADM

## 2022-05-23 RX ORDER — NITROGLYCERIN 20 MG/100ML
INJECTION INTRAVENOUS
Status: COMPLETED
Start: 2022-05-23 | End: 2022-05-23

## 2022-05-23 RX ORDER — NAPROXEN 500 MG/1
250 TABLET ORAL 2 TIMES DAILY WITH MEALS
Status: DISCONTINUED | OUTPATIENT
Start: 2022-05-23 | End: 2022-05-25 | Stop reason: HOSPADM

## 2022-05-23 RX ORDER — ATORVASTATIN CALCIUM 40 MG/1
40 TABLET, FILM COATED ORAL DAILY
Status: DISCONTINUED | OUTPATIENT
Start: 2022-05-24 | End: 2022-05-25 | Stop reason: HOSPADM

## 2022-05-23 RX ORDER — DIPHENHYDRAMINE HYDROCHLORIDE 50 MG/ML
INJECTION INTRAMUSCULAR; INTRAVENOUS AS NEEDED
Status: DISCONTINUED | OUTPATIENT
Start: 2022-05-23 | End: 2022-05-23 | Stop reason: HOSPADM

## 2022-05-23 RX ORDER — MIDAZOLAM HYDROCHLORIDE 1 MG/ML
INJECTION, SOLUTION INTRAMUSCULAR; INTRAVENOUS AS NEEDED
Status: DISCONTINUED | OUTPATIENT
Start: 2022-05-23 | End: 2022-05-23 | Stop reason: HOSPADM

## 2022-05-23 RX ORDER — ONDANSETRON 2 MG/ML
4 INJECTION INTRAMUSCULAR; INTRAVENOUS EVERY 6 HOURS PRN
Status: DISCONTINUED | OUTPATIENT
Start: 2022-05-23 | End: 2022-05-23 | Stop reason: HOSPADM

## 2022-05-23 RX ORDER — MORPHINE SULFATE 2 MG/ML
2 INJECTION, SOLUTION INTRAMUSCULAR; INTRAVENOUS EVERY 4 HOURS PRN
Status: DISCONTINUED | OUTPATIENT
Start: 2022-05-23 | End: 2022-05-25 | Stop reason: HOSPADM

## 2022-05-23 RX ADMIN — GABAPENTIN 100 MG: 100 CAPSULE ORAL at 20:20

## 2022-05-23 RX ADMIN — SODIUM CHLORIDE 2000 ML: 9 INJECTION, SOLUTION INTRAVENOUS at 20:20

## 2022-05-23 RX ADMIN — NITROGLYCERIN 10 MCG/MIN: 20 INJECTION INTRAVENOUS at 15:39

## 2022-05-23 RX ADMIN — NAPROXEN 250 MG: 500 TABLET ORAL at 20:19

## 2022-05-24 PROBLEM — I25.110 CORONARY ARTERY DISEASE INVOLVING NATIVE CORONARY ARTERY OF NATIVE HEART WITH UNSTABLE ANGINA PECTORIS (HCC): Status: ACTIVE | Noted: 2022-05-24

## 2022-05-24 LAB
ANION GAP SERPL CALCULATED.3IONS-SCNC: 10 MMOL/L (ref 5–15)
BUN SERPL-MCNC: 12 MG/DL (ref 8–23)
BUN/CREAT SERPL: 16.2 (ref 7–25)
CALCIUM SPEC-SCNC: 8.4 MG/DL (ref 8.6–10.5)
CHLORIDE SERPL-SCNC: 104 MMOL/L (ref 98–107)
CO2 SERPL-SCNC: 25 MMOL/L (ref 22–29)
CREAT SERPL-MCNC: 0.74 MG/DL (ref 0.76–1.27)
DEPRECATED RDW RBC AUTO: 42.5 FL (ref 37–54)
EGFRCR SERPLBLD CKD-EPI 2021: 101.8 ML/MIN/1.73
ERYTHROCYTE [DISTWIDTH] IN BLOOD BY AUTOMATED COUNT: 11.9 % (ref 12.3–15.4)
GLUCOSE SERPL-MCNC: 125 MG/DL (ref 65–99)
HCT VFR BLD AUTO: 38.6 % (ref 37.5–51)
HGB BLD-MCNC: 12.8 G/DL (ref 13–17.7)
MCH RBC QN AUTO: 32.2 PG (ref 26.6–33)
MCHC RBC AUTO-ENTMCNC: 33.2 G/DL (ref 31.5–35.7)
MCV RBC AUTO: 97.2 FL (ref 79–97)
PLATELET # BLD AUTO: 173 10*3/MM3 (ref 140–450)
PMV BLD AUTO: 12.6 FL (ref 6–12)
POTASSIUM SERPL-SCNC: 4.7 MMOL/L (ref 3.5–5.2)
QT INTERVAL: 380 MS
QT INTERVAL: 380 MS
QT INTERVAL: 384 MS
QTC INTERVAL: 405 MS
QTC INTERVAL: 412 MS
QTC INTERVAL: 416 MS
RBC # BLD AUTO: 3.97 10*6/MM3 (ref 4.14–5.8)
SODIUM SERPL-SCNC: 139 MMOL/L (ref 136–145)
WBC NRBC COR # BLD: 7.53 10*3/MM3 (ref 3.4–10.8)

## 2022-05-24 PROCEDURE — 99152 MOD SED SAME PHYS/QHP 5/>YRS: CPT | Performed by: EMERGENCY MEDICINE

## 2022-05-24 PROCEDURE — 93005 ELECTROCARDIOGRAM TRACING: CPT | Performed by: EMERGENCY MEDICINE

## 2022-05-24 PROCEDURE — C1725 CATH, TRANSLUMIN NON-LASER: HCPCS | Performed by: EMERGENCY MEDICINE

## 2022-05-24 PROCEDURE — 80048 BASIC METABOLIC PNL TOTAL CA: CPT | Performed by: EMERGENCY MEDICINE

## 2022-05-24 PROCEDURE — C1760 CLOSURE DEV, VASC: HCPCS | Performed by: EMERGENCY MEDICINE

## 2022-05-24 PROCEDURE — 99153 MOD SED SAME PHYS/QHP EA: CPT | Performed by: EMERGENCY MEDICINE

## 2022-05-24 PROCEDURE — 02703ZZ DILATION OF CORONARY ARTERY, ONE ARTERY, PERCUTANEOUS APPROACH: ICD-10-PCS | Performed by: EMERGENCY MEDICINE

## 2022-05-24 PROCEDURE — C1769 GUIDE WIRE: HCPCS | Performed by: EMERGENCY MEDICINE

## 2022-05-24 PROCEDURE — 25010000002 HEPARIN (PORCINE) PER 1000 UNITS: Performed by: EMERGENCY MEDICINE

## 2022-05-24 PROCEDURE — 25010000002 HEPARIN (PORCINE) 2000-0.9 UNIT/L-% SOLUTION: Performed by: EMERGENCY MEDICINE

## 2022-05-24 PROCEDURE — 25010000002 LORAZEPAM PER 2 MG: Performed by: EMERGENCY MEDICINE

## 2022-05-24 PROCEDURE — C1874 STENT, COATED/COV W/DEL SYS: HCPCS | Performed by: EMERGENCY MEDICINE

## 2022-05-24 PROCEDURE — C9600 PERC DRUG-EL COR STENT SING: HCPCS | Performed by: EMERGENCY MEDICINE

## 2022-05-24 PROCEDURE — 25010000002 MIDAZOLAM HCL (PF) 5 MG/5ML SOLUTION: Performed by: EMERGENCY MEDICINE

## 2022-05-24 PROCEDURE — C1887 CATHETER, GUIDING: HCPCS | Performed by: EMERGENCY MEDICINE

## 2022-05-24 PROCEDURE — 85027 COMPLETE CBC AUTOMATED: CPT | Performed by: EMERGENCY MEDICINE

## 2022-05-24 PROCEDURE — 25010000002 IOPAMIDOL 61 % SOLUTION: Performed by: EMERGENCY MEDICINE

## 2022-05-24 PROCEDURE — 25010000002 FENTANYL CITRATE (PF) 50 MCG/ML SOLUTION: Performed by: EMERGENCY MEDICINE

## 2022-05-24 PROCEDURE — 93010 ELECTROCARDIOGRAM REPORT: CPT | Performed by: EMERGENCY MEDICINE

## 2022-05-24 PROCEDURE — C1894 INTRO/SHEATH, NON-LASER: HCPCS | Performed by: EMERGENCY MEDICINE

## 2022-05-24 PROCEDURE — 85347 COAGULATION TIME ACTIVATED: CPT

## 2022-05-24 PROCEDURE — 92928 PRQ TCAT PLMT NTRAC ST 1 LES: CPT | Performed by: EMERGENCY MEDICINE

## 2022-05-24 PROCEDURE — 25010000002 HEPARIN (PORCINE) 1000-0.9 UT/500ML-% SOLUTION: Performed by: EMERGENCY MEDICINE

## 2022-05-24 RX ORDER — ISOSORBIDE MONONITRATE 60 MG/1
60 TABLET, EXTENDED RELEASE ORAL
Status: DISCONTINUED | OUTPATIENT
Start: 2022-05-25 | End: 2022-05-25 | Stop reason: HOSPADM

## 2022-05-24 RX ORDER — HEPARIN SODIUM 5000 [USP'U]/ML
INJECTION, SOLUTION INTRAVENOUS; SUBCUTANEOUS AS NEEDED
Status: DISCONTINUED | OUTPATIENT
Start: 2022-05-24 | End: 2022-05-24 | Stop reason: HOSPADM

## 2022-05-24 RX ORDER — MIDAZOLAM HYDROCHLORIDE 1 MG/ML
INJECTION, SOLUTION INTRAMUSCULAR; INTRAVENOUS AS NEEDED
Status: DISCONTINUED | OUTPATIENT
Start: 2022-05-24 | End: 2022-05-24 | Stop reason: HOSPADM

## 2022-05-24 RX ORDER — HEPARIN SODIUM 200 [USP'U]/100ML
INJECTION, SOLUTION INTRAVENOUS AS NEEDED
Status: DISCONTINUED | OUTPATIENT
Start: 2022-05-24 | End: 2022-05-24 | Stop reason: HOSPADM

## 2022-05-24 RX ORDER — SODIUM CHLORIDE 9 MG/ML
5 INJECTION, SOLUTION INTRAVENOUS CONTINUOUS
Status: CANCELLED | OUTPATIENT
Start: 2022-05-24 | End: 2022-05-25

## 2022-05-24 RX ORDER — ACETAMINOPHEN 325 MG/1
650 TABLET ORAL EVERY 4 HOURS PRN
Status: CANCELLED | OUTPATIENT
Start: 2022-05-24

## 2022-05-24 RX ORDER — LIDOCAINE HYDROCHLORIDE 20 MG/ML
INJECTION, SOLUTION INFILTRATION; PERINEURAL AS NEEDED
Status: DISCONTINUED | OUTPATIENT
Start: 2022-05-24 | End: 2022-05-24 | Stop reason: HOSPADM

## 2022-05-24 RX ORDER — SODIUM CHLORIDE 9 MG/ML
100 INJECTION, SOLUTION INTRAVENOUS CONTINUOUS
Status: CANCELLED | OUTPATIENT
Start: 2022-05-24

## 2022-05-24 RX ORDER — RANOLAZINE 500 MG/1
500 TABLET, EXTENDED RELEASE ORAL EVERY 12 HOURS SCHEDULED
Status: DISCONTINUED | OUTPATIENT
Start: 2022-05-24 | End: 2022-05-25 | Stop reason: HOSPADM

## 2022-05-24 RX ORDER — FENTANYL CITRATE 50 UG/ML
INJECTION, SOLUTION INTRAMUSCULAR; INTRAVENOUS AS NEEDED
Status: DISCONTINUED | OUTPATIENT
Start: 2022-05-24 | End: 2022-05-24 | Stop reason: HOSPADM

## 2022-05-24 RX ORDER — LORAZEPAM 2 MG/ML
1 INJECTION INTRAMUSCULAR EVERY 6 HOURS PRN
Status: DISCONTINUED | OUTPATIENT
Start: 2022-05-24 | End: 2022-05-25 | Stop reason: HOSPADM

## 2022-05-24 RX ADMIN — LISINOPRIL 40 MG: 20 TABLET ORAL at 10:22

## 2022-05-24 RX ADMIN — GABAPENTIN 100 MG: 100 CAPSULE ORAL at 20:04

## 2022-05-24 RX ADMIN — TICAGRELOR 90 MG: 90 TABLET ORAL at 04:20

## 2022-05-24 RX ADMIN — RANOLAZINE 500 MG: 500 TABLET, FILM COATED, EXTENDED RELEASE ORAL at 22:05

## 2022-05-24 RX ADMIN — ATORVASTATIN CALCIUM 40 MG: 40 TABLET, FILM COATED ORAL at 10:22

## 2022-05-24 RX ADMIN — METOPROLOL SUCCINATE 25 MG: 25 TABLET, EXTENDED RELEASE ORAL at 10:22

## 2022-05-24 RX ADMIN — LORAZEPAM 1 MG: 2 INJECTION INTRAMUSCULAR; INTRAVENOUS at 10:22

## 2022-05-24 RX ADMIN — SODIUM CHLORIDE 100 ML/HR: 9 INJECTION, SOLUTION INTRAVENOUS at 07:55

## 2022-05-24 RX ADMIN — NAPROXEN 250 MG: 500 TABLET ORAL at 10:21

## 2022-05-24 RX ADMIN — TICAGRELOR 90 MG: 90 TABLET ORAL at 20:04

## 2022-05-24 RX ADMIN — ASPIRIN 81 MG: 81 TABLET, COATED ORAL at 10:22

## 2022-05-24 RX ADMIN — GABAPENTIN 100 MG: 100 CAPSULE ORAL at 10:21

## 2022-05-25 ENCOUNTER — READMISSION MANAGEMENT (OUTPATIENT)
Dept: CALL CENTER | Facility: HOSPITAL | Age: 62
End: 2022-05-25

## 2022-05-25 VITALS
HEART RATE: 85 BPM | BODY MASS INDEX: 31.26 KG/M2 | DIASTOLIC BLOOD PRESSURE: 88 MMHG | HEIGHT: 72 IN | OXYGEN SATURATION: 95 % | TEMPERATURE: 98.1 F | WEIGHT: 230.8 LBS | SYSTOLIC BLOOD PRESSURE: 121 MMHG | RESPIRATION RATE: 18 BRPM

## 2022-05-25 PROCEDURE — 99238 HOSP IP/OBS DSCHRG MGMT 30/<: CPT | Performed by: EMERGENCY MEDICINE

## 2022-05-25 RX ORDER — RANOLAZINE 500 MG/1
500 TABLET, EXTENDED RELEASE ORAL EVERY 12 HOURS SCHEDULED
Qty: 60 TABLET | Refills: 5 | Status: SHIPPED | OUTPATIENT
Start: 2022-05-25 | End: 2023-03-07

## 2022-05-25 RX ORDER — ASPIRIN 81 MG/1
81 TABLET ORAL DAILY
Qty: 90 TABLET | Refills: 1 | Status: SHIPPED | OUTPATIENT
Start: 2022-05-25

## 2022-05-25 RX ORDER — ISOSORBIDE MONONITRATE 60 MG/1
60 TABLET, EXTENDED RELEASE ORAL
Qty: 90 TABLET | Refills: 1 | Status: SHIPPED | OUTPATIENT
Start: 2022-05-25 | End: 2022-06-02 | Stop reason: HOSPADM

## 2022-05-25 NOTE — OUTREACH NOTE
Prep Survey    Flowsheet Row Responses   [facility] patient discharged from? [place]   Is LACE score < 7 ? No   Emergency Room discharge w/ pulse ox? No   Eligibility [facility]   Date of Admission 05/23/22   Date of Discharge 05/25/22   Discharge Disposition Home or Self Care   Discharge diagnosis Abnormal nuclear stress test,    CAD (coronary artery disease),    left heart catheterization    Does the patient have one of the following disease processes/diagnoses(primary or secondary)? Other   Does the patient have Home health ordered? No   Is there a DME ordered? No   Prep survey completed? Yes          YOSVANY SANCHEZ - Registered Nurse

## 2022-05-26 ENCOUNTER — TELEPHONE (OUTPATIENT)
Dept: FAMILY MEDICINE CLINIC | Facility: CLINIC | Age: 62
End: 2022-05-26

## 2022-05-26 ENCOUNTER — TRANSITIONAL CARE MANAGEMENT TELEPHONE ENCOUNTER (OUTPATIENT)
Dept: CALL CENTER | Facility: HOSPITAL | Age: 62
End: 2022-05-26

## 2022-05-26 DIAGNOSIS — Z95.5 S/P DRUG ELUTING CORONARY STENT PLACEMENT: Primary | ICD-10-CM

## 2022-05-26 NOTE — OUTREACH NOTE
Call Center TCM Note    Flowsheet Row Responses   [facility] patient discharged from? [place]   Does the patient have one of the following disease processes/diagnoses(primary or secondary)? Other   TCM attempt successful? Yes   Call start time 1033   Call end time 1035   Discharge diagnosis Abnormal nuclear stress test,    CAD (coronary artery disease),    left heart catheterization    Meds reviewed with patient/caregiver? Yes   Is the patient having any side effects they believe may be caused by any medication additions or changes? No   Does the patient have all medications ordered at discharge? Yes   Is the patient taking all medications as directed (includes completed medication regime)? Yes   Comments regarding appointments Cardiology on 6-22-22    Does the patient have a primary care provider?  Yes   Does the patient have an appointment with their PCP within 7 days of discharge? No   Comments regarding PCP Patient would like to schedule fu apt himself - will route message to group    Nursing Interventions Educated patient on importance of making appointment   Psychosocial issues? No   Did the patient receive a copy of their discharge instructions? Yes   Nursing interventions Reviewed instructions with patient   What is the patient's perception of their health status since discharge? Improving   Is the patient/caregiver able to teach back signs and symptoms related to disease process for when to call PCP? Yes   Is the patient/caregiver able to teach back signs and symptoms related to disease process for when to call 911? Yes   Is the patient/caregiver able to teach back the hierarchy of who to call/visit for symptoms/problems? PCP, Specialist, Home health nurse, Urgent Care, ED, 911 Yes   If the patient is a current smoker, are they able to teach back resources for cessation? Not a smoker   TCM call completed? Yes          Nancy Martinez RN    5/26/2022, 10:38 CDT

## 2022-05-26 NOTE — OUTREACH NOTE
Call Center TCM Note    Flowsheet Row Responses   [facility] patient discharged from? [place]   Does the patient have one of the following disease processes/diagnoses(primary or secondary)? Other   TCM attempt successful? No   Unsuccessful attempts Attempt 1  [Verbal release from PCP office on file is out of date ]          Nancy Martinez RN    5/26/2022, 09:59 CDT

## 2022-05-27 LAB
ACT BLD: 225 SECONDS (ref 82–152)
ACT BLD: 231 SECONDS (ref 82–152)
ACT BLD: 237 SECONDS (ref 82–152)
ACT BLD: 244 SECONDS (ref 82–152)
ACT BLD: 261 SECONDS (ref 82–152)
ACT BLD: 273 SECONDS (ref 82–152)
ACT BLD: 321 SECONDS (ref 82–152)

## 2022-05-31 ENCOUNTER — OFFICE VISIT (OUTPATIENT)
Dept: FAMILY MEDICINE CLINIC | Facility: CLINIC | Age: 62
End: 2022-05-31

## 2022-05-31 VITALS
HEIGHT: 72 IN | WEIGHT: 232 LBS | HEART RATE: 82 BPM | RESPIRATION RATE: 14 BRPM | OXYGEN SATURATION: 95 % | SYSTOLIC BLOOD PRESSURE: 132 MMHG | BODY MASS INDEX: 31.42 KG/M2 | DIASTOLIC BLOOD PRESSURE: 70 MMHG

## 2022-05-31 DIAGNOSIS — I25.10 CORONARY ARTERY DISEASE INVOLVING NATIVE HEART, UNSPECIFIED VESSEL OR LESION TYPE, UNSPECIFIED WHETHER ANGINA PRESENT: Primary | ICD-10-CM

## 2022-05-31 PROCEDURE — 99213 OFFICE O/P EST LOW 20 MIN: CPT | Performed by: FAMILY MEDICINE

## 2022-05-31 NOTE — PROGRESS NOTES
Subjective   Ashley Bradley is a 62 y.o. male.     Chief Complaint   Patient presents with   • Follow-up     F/u after cardiology OV   pt states that after beginning new meds from cardiology he is very tired and weak all the time        History of Present Illness     he went to [facility] found to have cad---he would like to go to [place] to see cardiology      Current Outpatient Medications:   •  aspirin 81 MG EC tablet, Take 1 tablet by mouth Daily., Disp: 90 tablet, Rfl: 1  •  atorvastatin (LIPITOR) 40 MG tablet, Take 1 tablet by mouth Daily., Disp: 90 tablet, Rfl: 3  •  diclofenac (VOLTAREN) 75 MG EC tablet, TAKE 1 TABLET BY MOUTH TWICE A DAY, Disp: 180 tablet, Rfl: 0  •  gabapentin (NEURONTIN) 100 MG capsule, TAKE 1 CAPSULE BY MOUTH THREE TIMES A DAY, Disp: 90 capsule, Rfl: 0  •  isosorbide mononitrate (IMDUR) 60 MG 24 hr tablet, Take 1 tablet by mouth Daily., Disp: 90 tablet, Rfl: 1  •  lisinopril (PRINIVIL,ZESTRIL) 40 MG tablet, TAKE 1 TABLET BY MOUTH EVERY DAY, Disp: 90 tablet, Rfl: 0  •  metFORMIN ER (GLUCOPHAGE-XR) 500 MG 24 hr tablet, TAKE 2 TABLETS BY MOUTH DAILY WITH BREAKFAST., Disp: 180 tablet, Rfl: 0  •  metoprolol succinate XL (TOPROL-XL) 25 MG 24 hr tablet, Take 1 tablet by mouth Daily., Disp: 30 tablet, Rfl: 11  •  nitroglycerin (NITROSTAT) 0.4 MG SL tablet, 1 under the tongue as needed for angina, may repeat q5mins for up three doses, Disp: 30 tablet, Rfl: 3  •  ranolazine (RANEXA) 500 MG 12 hr tablet, Take 1 tablet by mouth Every 12 (Twelve) Hours., Disp: 60 tablet, Rfl: 5  •  Semaglutide (Rybelsus) 7 MG tablet, Take 7 mg by mouth Daily., Disp: 90 tablet, Rfl: 1  •  ticagrelor (BRILINTA) 90 MG tablet tablet, Take 1 tablet by mouth 2 (Two) Times a Day., Disp: 180 tablet, Rfl: 1  •  Continuous Blood Gluc  (FREESTYLE MAURI 14 DAY READER) device, 1 each Daily., Disp: 1 Device, Rfl: 0  •  Continuous Blood Gluc Sensor (FREESTYLE MAURI 14 DAY SENSOR) misc, 1 each Every 14 (Fourteen)  "Days., Disp: 1 each, Rfl: 4  •  glucose blood test strip, onetouch test strips-dx e11.9- test twice daily, Disp: 100 each, Rfl: 12  •  ONETOUCH DELICA LANCETS FINE misc, Use to test blood sugar twice daily, Disp: 200 each, Rfl: 3  Allergies   Allergen Reactions   • Penicillamine Unknown - High Severity   • Penicillins        BMI is >= 30 and <= 34.9 (Class 1 obesity). The following options were offered after discussion: nutrition counseling/recommendations      Past Medical History:   Diagnosis Date   • Diabetes mellitus (HCC)    • Diverticula of colon    • GERD (gastroesophageal reflux disease)    • Hypertension      Past Surgical History:   Procedure Laterality Date   • CARDIAC CATHETERIZATION     • CARDIAC CATHETERIZATION Right 5/23/2022    Procedure: Left Heart  WITH POSS INTERVENTION, RIGHT RADIAL, US GUIDED;  Surgeon: Earl Flood DO;  Location:  [facility] CATH INVASIVE LOCATION;  Service: Cardiovascular;  Laterality: Right;   • CARDIAC CATHETERIZATION N/A 5/24/2022    Procedure: Percutaneous Coronary Intervention;  Surgeon: Earl Flood DO;  Location:  [facility] CATH INVASIVE LOCATION;  Service: Cardiovascular;  Laterality: N/A;   • COLONOSCOPY  11/01/2010    snare 70, 60, rectum tics recall 5 years   • COLONOSCOPY Left 11/03/2016    Procedure: COLONOSCOPY WITH ANESTHESIA;  Surgeon: Lisandro Holt MD;  Location: [facility] ENDOSCOPY;  Service:    • HEMORRHOIDECTOMY     • HERNIA REPAIR     • TONSILLECTOMY         Review of Systems   Constitutional: Positive for fatigue.   HENT: Negative.    Eyes: Negative.    Respiratory: Negative.    Cardiovascular: Negative.    Endocrine: Negative.    Genitourinary: Negative.    Musculoskeletal: Negative.    Skin: Negative.    Allergic/Immunologic: Negative.    Neurological: Negative.    Hematological: Negative.    Psychiatric/Behavioral: Negative.        Objective  /70   Pulse 82   Resp 14   Ht 182.9 cm (72\")   Wt 105 kg (232 lb)   SpO2 95%   " BMI 31.46 kg/m²   Physical Exam  Vitals and nursing note reviewed.   Constitutional:       Appearance: Normal appearance. He is normal weight.   HENT:      Head: Normocephalic and atraumatic.      Nose: Nose normal.      Mouth/Throat:      Mouth: Mucous membranes are moist.   Eyes:      Extraocular Movements: Extraocular movements intact.      Conjunctiva/sclera: Conjunctivae normal.      Pupils: Pupils are equal, round, and reactive to light.   Cardiovascular:      Rate and Rhythm: Normal rate and regular rhythm.      Pulses: Normal pulses.   Pulmonary:      Effort: Pulmonary effort is normal.      Breath sounds: Normal breath sounds.   Abdominal:      General: Abdomen is flat. Bowel sounds are normal.      Palpations: Abdomen is soft.   Musculoskeletal:         General: Normal range of motion.      Cervical back: Normal range of motion.   Skin:     General: Skin is warm and dry.      Capillary Refill: Capillary refill takes less than 2 seconds.   Neurological:      General: No focal deficit present.      Mental Status: He is alert and oriented to person, place, and time. Mental status is at baseline.   Psychiatric:         Mood and Affect: Mood normal.         Behavior: Behavior normal.         Thought Content: Thought content normal.         Judgment: Judgment normal.         Assessment & Plan   Diagnoses and all orders for this visit:    1. Coronary artery disease involving native heart, unspecified vessel or lesion type, unspecified whether angina present (Primary)  -     Ambulatory Referral to Cardiology                 Orders Placed This Encounter   Procedures   • Ambulatory Referral to Cardiology     Referral Priority:   Routine     Referral Type:   Consultation     Referral Reason:   Specialty Services Required     Requested Specialty:   Cardiology     Number of Visits Requested:   1       Follow up: 2 month(s)

## 2022-06-01 ENCOUNTER — HOSPITAL ENCOUNTER (OUTPATIENT)
Facility: HOSPITAL | Age: 62
Setting detail: OBSERVATION
Discharge: HOME | End: 2022-06-02
Attending: FAMILY MEDICINE | Admitting: FAMILY MEDICINE

## 2022-06-01 DIAGNOSIS — R55 SYNCOPE AND COLLAPSE: Primary | ICD-10-CM

## 2022-06-01 DIAGNOSIS — I25.10 CORONARY ARTERY DISEASE INVOLVING NATIVE HEART WITHOUT ANGINA PECTORIS, UNSPECIFIED VESSEL OR LESION TYPE: ICD-10-CM

## 2022-06-01 LAB
ALBUMIN SERPL-MCNC: 3.8 G/DL (ref 3.5–5.2)
ALBUMIN/GLOB SERPL: 1.5 G/DL
ALP SERPL-CCNC: 86 U/L (ref 39–117)
ALT SERPL W P-5'-P-CCNC: 25 U/L (ref 1–41)
ANION GAP SERPL CALCULATED.3IONS-SCNC: 14 MMOL/L (ref 5–15)
AST SERPL-CCNC: 22 U/L (ref 1–40)
BASOPHILS # BLD AUTO: 0.1 10*3/MM3 (ref 0–0.2)
BASOPHILS NFR BLD AUTO: 1.1 % (ref 0–1.5)
BILIRUB SERPL-MCNC: 0.6 MG/DL (ref 0–1.2)
BUN SERPL-MCNC: 15 MG/DL (ref 8–23)
BUN/CREAT SERPL: 13.2 (ref 7–25)
CALCIUM SPEC-SCNC: 9 MG/DL (ref 8.6–10.5)
CHLORIDE SERPL-SCNC: 100 MMOL/L (ref 98–107)
CO2 SERPL-SCNC: 22 MMOL/L (ref 22–29)
CREAT SERPL-MCNC: 1.14 MG/DL (ref 0.76–1.27)
D DIMER PPP FEU-MCNC: 0.3 MCGFEU/ML (ref 0–0.5)
DEPRECATED RDW RBC AUTO: 42.4 FL (ref 37–54)
EGFRCR SERPLBLD CKD-EPI 2021: 72.3 ML/MIN/1.73
EOSINOPHIL # BLD AUTO: 0.24 10*3/MM3 (ref 0–0.4)
EOSINOPHIL NFR BLD AUTO: 2.6 % (ref 0.3–6.2)
ERYTHROCYTE [DISTWIDTH] IN BLOOD BY AUTOMATED COUNT: 12.1 % (ref 12.3–15.4)
GLOBULIN UR ELPH-MCNC: 2.5 GM/DL
GLUCOSE SERPL-MCNC: 143 MG/DL (ref 65–99)
HCT VFR BLD AUTO: 39.4 % (ref 37.5–51)
HGB BLD-MCNC: 13.8 G/DL (ref 13–17.7)
HOLD SPECIMEN: NORMAL
IMM GRANULOCYTES # BLD AUTO: 0.05 10*3/MM3 (ref 0–0.05)
IMM GRANULOCYTES NFR BLD AUTO: 0.5 % (ref 0–0.5)
LYMPHOCYTES # BLD AUTO: 1.93 10*3/MM3 (ref 0.7–3.1)
LYMPHOCYTES NFR BLD AUTO: 21 % (ref 19.6–45.3)
MCH RBC QN AUTO: 33.7 PG (ref 26.6–33)
MCHC RBC AUTO-ENTMCNC: 35 G/DL (ref 31.5–35.7)
MCV RBC AUTO: 96.3 FL (ref 79–97)
MONOCYTES # BLD AUTO: 0.66 10*3/MM3 (ref 0.1–0.9)
MONOCYTES NFR BLD AUTO: 7.2 % (ref 5–12)
NEUTROPHILS NFR BLD AUTO: 6.19 10*3/MM3 (ref 1.7–7)
NEUTROPHILS NFR BLD AUTO: 67.6 % (ref 42.7–76)
NRBC BLD AUTO-RTO: 0 /100 WBC (ref 0–0.2)
NT-PROBNP SERPL-MCNC: 61 PG/ML (ref 0–900)
PLATELET # BLD AUTO: 265 10*3/MM3 (ref 140–450)
PMV BLD AUTO: 12.2 FL (ref 6–12)
POTASSIUM SERPL-SCNC: 4.1 MMOL/L (ref 3.5–5.2)
PROT SERPL-MCNC: 6.3 G/DL (ref 6–8.5)
RBC # BLD AUTO: 4.09 10*6/MM3 (ref 4.14–5.8)
SARS-COV-2 RNA PNL SPEC NAA+PROBE: NOT DETECTED
SODIUM SERPL-SCNC: 136 MMOL/L (ref 136–145)
TROPONIN T SERPL-MCNC: 0.05 NG/ML (ref 0–0.03)
TROPONIN T SERPL-MCNC: 0.06 NG/ML (ref 0–0.03)
WBC NRBC COR # BLD: 9.17 10*3/MM3 (ref 3.4–10.8)
WHOLE BLOOD HOLD COAG: NORMAL
WHOLE BLOOD HOLD SPECIMEN: NORMAL

## 2022-06-01 PROCEDURE — 83880 ASSAY OF NATRIURETIC PEPTIDE: CPT | Performed by: FAMILY MEDICINE

## 2022-06-01 PROCEDURE — C9803 HOPD COVID-19 SPEC COLLECT: HCPCS

## 2022-06-01 PROCEDURE — 99285 EMERGENCY DEPT VISIT HI MDM: CPT

## 2022-06-01 PROCEDURE — 93010 ELECTROCARDIOGRAM REPORT: CPT | Performed by: INTERNAL MEDICINE

## 2022-06-01 PROCEDURE — 80053 COMPREHEN METABOLIC PANEL: CPT | Performed by: FAMILY MEDICINE

## 2022-06-01 PROCEDURE — G0378 HOSPITAL OBSERVATION PER HR: HCPCS

## 2022-06-01 PROCEDURE — 93005 ELECTROCARDIOGRAM TRACING: CPT | Performed by: FAMILY MEDICINE

## 2022-06-01 PROCEDURE — 85025 COMPLETE CBC W/AUTO DIFF WBC: CPT | Performed by: FAMILY MEDICINE

## 2022-06-01 PROCEDURE — 87635 SARS-COV-2 COVID-19 AMP PRB: CPT | Performed by: EMERGENCY MEDICINE

## 2022-06-01 PROCEDURE — 84484 ASSAY OF TROPONIN QUANT: CPT | Performed by: FAMILY MEDICINE

## 2022-06-01 PROCEDURE — 85379 FIBRIN DEGRADATION QUANT: CPT | Performed by: FAMILY MEDICINE

## 2022-06-01 RX ORDER — SODIUM CHLORIDE 0.9 % (FLUSH) 0.9 %
10 SYRINGE (ML) INJECTION EVERY 12 HOURS SCHEDULED
Status: DISCONTINUED | OUTPATIENT
Start: 2022-06-01 | End: 2022-06-02 | Stop reason: HOSPADM

## 2022-06-01 RX ORDER — NICOTINE POLACRILEX 4 MG
15 LOZENGE BUCCAL
Status: DISCONTINUED | OUTPATIENT
Start: 2022-06-01 | End: 2022-06-02 | Stop reason: HOSPADM

## 2022-06-01 RX ORDER — METOPROLOL SUCCINATE 25 MG/1
25 TABLET, EXTENDED RELEASE ORAL DAILY
Status: DISCONTINUED | OUTPATIENT
Start: 2022-06-02 | End: 2022-06-02 | Stop reason: HOSPADM

## 2022-06-01 RX ORDER — DEXTROSE MONOHYDRATE 25 G/50ML
25 INJECTION, SOLUTION INTRAVENOUS
Status: DISCONTINUED | OUTPATIENT
Start: 2022-06-01 | End: 2022-06-02 | Stop reason: HOSPADM

## 2022-06-01 RX ORDER — ONDANSETRON 2 MG/ML
4 INJECTION INTRAMUSCULAR; INTRAVENOUS EVERY 6 HOURS PRN
Status: DISCONTINUED | OUTPATIENT
Start: 2022-06-01 | End: 2022-06-02 | Stop reason: HOSPADM

## 2022-06-01 RX ORDER — NITROGLYCERIN 0.4 MG/1
0.4 TABLET SUBLINGUAL
Status: DISCONTINUED | OUTPATIENT
Start: 2022-06-01 | End: 2022-06-02 | Stop reason: HOSPADM

## 2022-06-01 RX ORDER — INSULIN LISPRO 100 [IU]/ML
2-7 INJECTION, SOLUTION INTRAVENOUS; SUBCUTANEOUS
Status: DISCONTINUED | OUTPATIENT
Start: 2022-06-02 | End: 2022-06-02 | Stop reason: HOSPADM

## 2022-06-01 RX ORDER — ATORVASTATIN CALCIUM 40 MG/1
40 TABLET, FILM COATED ORAL NIGHTLY
Status: DISCONTINUED | OUTPATIENT
Start: 2022-06-01 | End: 2022-06-02 | Stop reason: HOSPADM

## 2022-06-01 RX ORDER — SODIUM CHLORIDE 0.9 % (FLUSH) 0.9 %
10 SYRINGE (ML) INJECTION AS NEEDED
Status: DISCONTINUED | OUTPATIENT
Start: 2022-06-01 | End: 2022-06-02 | Stop reason: HOSPADM

## 2022-06-01 RX ORDER — ASPIRIN 81 MG/1
81 TABLET ORAL DAILY
Status: DISCONTINUED | OUTPATIENT
Start: 2022-06-02 | End: 2022-06-02 | Stop reason: HOSPADM

## 2022-06-01 RX ORDER — GABAPENTIN 100 MG/1
100 CAPSULE ORAL 3 TIMES DAILY
Status: DISCONTINUED | OUTPATIENT
Start: 2022-06-01 | End: 2022-06-02 | Stop reason: HOSPADM

## 2022-06-01 RX ORDER — RANOLAZINE 500 MG/1
500 TABLET, EXTENDED RELEASE ORAL EVERY 12 HOURS SCHEDULED
Status: DISCONTINUED | OUTPATIENT
Start: 2022-06-01 | End: 2022-06-02 | Stop reason: HOSPADM

## 2022-06-01 RX ORDER — ISOSORBIDE MONONITRATE 30 MG/1
30 TABLET, EXTENDED RELEASE ORAL
Status: DISCONTINUED | OUTPATIENT
Start: 2022-06-02 | End: 2022-06-02 | Stop reason: HOSPADM

## 2022-06-01 RX ORDER — LISINOPRIL 10 MG/1
10 TABLET ORAL
Status: DISCONTINUED | OUTPATIENT
Start: 2022-06-02 | End: 2022-06-02 | Stop reason: HOSPADM

## 2022-06-01 RX ORDER — ACETAMINOPHEN 325 MG/1
650 TABLET ORAL EVERY 4 HOURS PRN
Status: DISCONTINUED | OUTPATIENT
Start: 2022-06-01 | End: 2022-06-02 | Stop reason: HOSPADM

## 2022-06-01 RX ADMIN — RANOLAZINE 500 MG: 500 TABLET, FILM COATED, EXTENDED RELEASE ORAL at 23:02

## 2022-06-01 RX ADMIN — TICAGRELOR 90 MG: 90 TABLET ORAL at 23:02

## 2022-06-01 RX ADMIN — Medication 10 ML: at 23:02

## 2022-06-01 RX ADMIN — ATORVASTATIN CALCIUM 40 MG: 40 TABLET, FILM COATED ORAL at 23:02

## 2022-06-01 RX ADMIN — SODIUM CHLORIDE 1000 ML: 9 INJECTION, SOLUTION INTRAVENOUS at 18:18

## 2022-06-01 RX ADMIN — GABAPENTIN 100 MG: 100 CAPSULE ORAL at 23:02

## 2022-06-01 NOTE — ED PROVIDER NOTES
"Subjective   Mr. Bradley is a 62-year-old gentleman who last week had a stent placed by Dr. Flood.  Today he was at a restaurant and after it eaten he had sudden onset of feeling \"dizzy\" that is not vertiginous in nature.  His wife says that he looked sweaty and pale.  He did not actually lose consciousness but felt close to it.  In the ED he feels a little weak but is conscious and cooperative.  He denies chest pain and any new shortness of breath.  He has had no vomiting or diarrhea.  They denied any focal neurologic symptoms during this episode.          Review of Systems   Neurological: Positive for dizziness.   All other systems reviewed and are negative.      Past Medical History:   Diagnosis Date   • Diabetes mellitus (HCC)    • Diverticula of colon    • GERD (gastroesophageal reflux disease)    • Hypertension        Allergies   Allergen Reactions   • Penicillamine Unknown - High Severity   • Penicillins        Past Surgical History:   Procedure Laterality Date   • CARDIAC CATHETERIZATION     • CARDIAC CATHETERIZATION Right 5/23/2022    Procedure: Left Heart  WITH POSS INTERVENTION, RIGHT RADIAL, US GUIDED;  Surgeon: Earl Flood DO;  Location:  [facility] CATH INVASIVE LOCATION;  Service: Cardiovascular;  Laterality: Right;   • CARDIAC CATHETERIZATION N/A 5/24/2022    Procedure: Percutaneous Coronary Intervention;  Surgeon: Earl Flood DO;  Location: [facility] CATH INVASIVE LOCATION;  Service: Cardiovascular;  Laterality: N/A;   • COLONOSCOPY  11/01/2010    snare 70, 60, rectum tics recall 5 years   • COLONOSCOPY Left 11/03/2016    Procedure: COLONOSCOPY WITH ANESTHESIA;  Surgeon: Lisandro Holt MD;  Location: [facility] ENDOSCOPY;  Service:    • HEMORRHOIDECTOMY     • HERNIA REPAIR     • TONSILLECTOMY         Family History   Problem Relation Age of Onset   • Ulcerative colitis Mother    • Valvular heart disease Mother    • Heart murmur Father    • Colon cancer Neg Hx        Social " History     Socioeconomic History   • Marital status: Single   Tobacco Use   • Smoking status: Former Smoker     Packs/day: 0.25     Years: 40.00     Pack years: 10.00     Types: Cigarettes   • Smokeless tobacco: Current User     Types: Snuff   Vaping Use   • Vaping Use: Never used   Substance and Sexual Activity   • Alcohol use: No   • Drug use: No   • Sexual activity: Defer           Objective   Physical Exam  Vitals and nursing note reviewed.   Constitutional:       Appearance: He is well-developed.   HENT:      Head: Normocephalic and atraumatic.      Right Ear: External ear normal.      Left Ear: External ear normal.      Nose: Nose normal.   Eyes:      Extraocular Movements: Extraocular movements intact.      Conjunctiva/sclera: Conjunctivae normal.   Cardiovascular:      Rate and Rhythm: Normal rate and regular rhythm.      Pulses: Normal pulses.      Heart sounds: Normal heart sounds.   Pulmonary:      Effort: Pulmonary effort is normal.      Breath sounds: Normal breath sounds.   Abdominal:      General: Bowel sounds are normal.      Palpations: Abdomen is soft.   Musculoskeletal:         General: Normal range of motion.      Cervical back: Normal range of motion and neck supple.   Skin:     General: Skin is warm and dry.      Capillary Refill: Capillary refill takes less than 2 seconds.   Neurological:      General: No focal deficit present.      Mental Status: He is alert and oriented to person, place, and time.   Psychiatric:         Behavior: Behavior normal.         Thought Content: Thought content normal.         Judgment: Judgment normal.         Procedures           ED Course  ED Course as of 06/05/22 1012   Wed Jun 01, 2022 1921 Taken over from Dr Khoury  [TS]   2015 Patient had recent cardiac catheterization with difficulty of stenting the RCA.  He was put on multitude of medications today had a near syncope/syncopal episode at a restaurant he has had a similar episode in before that.  Came to  the ER for evaluation. [TS]   2015 Years Algorithm for Pulmonary Embolus  To be used in hemodynamically stable patient >18 years old    No signs of DVT are present, there is no hemoptysis, PE is not the most likely diagnosis and the D-dimer is not greater or equal to 1000 ng/mL. Therefore PE is excluded . The Years algorithm rules out PE (0.43 % with symptomatic VTE during 3-month follow-up) [TS]   2015 I have discussed this case with Dr. Singleton the patient is chest pain-free probably hypotension is the etiology of his near syncopal episodes on account of the multitude of medication that he is on the recommendation was admit to the hospital service watch overnight DC some of the medicine and see how he does. [TS]      ED Course User Index  [TS] Moisés Dempsey MD                                                 MDM  Number of Diagnoses or Management Options     Amount and/or Complexity of Data Reviewed  Clinical lab tests: ordered and reviewed  Tests in the radiology section of CPT®: ordered and reviewed    Patient Progress  Patient progress: stable      Final diagnoses:   Syncope and collapse   Coronary artery disease involving native heart without angina pectoris, unspecified vessel or lesion type       ED Disposition  ED Disposition     ED Disposition   Decision to Admit    Condition   --    Comment   Level of Care: Telemetry [5]   Diagnosis: Syncope and collapse [780.2.ICD-9-CM]   Admitting Physician: LY HOFF [[id number]]   Attending Physician: LY HOFF [[id number]]               Jian Rankin MD  [address] [address]  [phone]    Follow up on 6/8/2022  @ 10:45 AM    Earl Flood DO  [address] [address]  [phone]    Follow up on 6/22/2022  @ 10:30 AM         Medication List      Changed    isosorbide mononitrate 30 MG 24 hr tablet  Commonly known as: IMDUR  Take 1 tablet by mouth Daily.  What changed:   · medication strength  · how  much to take     lisinopril 10 MG tablet  Commonly known as: PRINIVIL,ZESTRIL  Take 1 tablet by mouth Daily.  What changed:   · medication strength  · how much to take  · when to take this           Where to Get Your Medications      These medications were sent to [facility]/pharmacy #[id number] - [place], [place] - [address] - [phone]  - [phone] fx [address] [facility] [place] [address]    Phone: [phone]   · isosorbide mononitrate 30 MG 24 hr tablet  · lisinopril 10 MG tablet            Elie Khoury MD  06/05/22 1012

## 2022-06-02 ENCOUNTER — READMISSION MANAGEMENT (OUTPATIENT)
Dept: CALL CENTER | Facility: HOSPITAL | Age: 62
End: 2022-06-02

## 2022-06-02 ENCOUNTER — TELEPHONE (OUTPATIENT)
Dept: FAMILY MEDICINE CLINIC | Facility: CLINIC | Age: 62
End: 2022-06-02

## 2022-06-02 VITALS
HEART RATE: 71 BPM | SYSTOLIC BLOOD PRESSURE: 100 MMHG | DIASTOLIC BLOOD PRESSURE: 58 MMHG | OXYGEN SATURATION: 97 % | BODY MASS INDEX: 30.34 KG/M2 | TEMPERATURE: 98.3 F | HEIGHT: 72 IN | WEIGHT: 224 LBS | RESPIRATION RATE: 18 BRPM

## 2022-06-02 DIAGNOSIS — M79.2 NEURALGIA AND NEURITIS, UNSPECIFIED: ICD-10-CM

## 2022-06-02 LAB
ALBUMIN SERPL-MCNC: 3.7 G/DL (ref 3.5–5.2)
ALBUMIN/GLOB SERPL: 1.5 G/DL
ALP SERPL-CCNC: 104 U/L (ref 39–117)
ALT SERPL W P-5'-P-CCNC: 22 U/L (ref 1–41)
ANION GAP SERPL CALCULATED.3IONS-SCNC: 7 MMOL/L (ref 5–15)
AST SERPL-CCNC: 17 U/L (ref 1–40)
BASOPHILS # BLD AUTO: 0.08 10*3/MM3 (ref 0–0.2)
BASOPHILS NFR BLD AUTO: 0.9 % (ref 0–1.5)
BILIRUB SERPL-MCNC: 0.4 MG/DL (ref 0–1.2)
BUN SERPL-MCNC: 16 MG/DL (ref 8–23)
BUN/CREAT SERPL: 15.5 (ref 7–25)
CALCIUM SPEC-SCNC: 9.2 MG/DL (ref 8.6–10.5)
CHLORIDE SERPL-SCNC: 102 MMOL/L (ref 98–107)
CO2 SERPL-SCNC: 26 MMOL/L (ref 22–29)
CREAT SERPL-MCNC: 1.03 MG/DL (ref 0.76–1.27)
DEPRECATED RDW RBC AUTO: 43.5 FL (ref 37–54)
EGFRCR SERPLBLD CKD-EPI 2021: 81.6 ML/MIN/1.73
EOSINOPHIL # BLD AUTO: 0.23 10*3/MM3 (ref 0–0.4)
EOSINOPHIL NFR BLD AUTO: 2.7 % (ref 0.3–6.2)
ERYTHROCYTE [DISTWIDTH] IN BLOOD BY AUTOMATED COUNT: 12.1 % (ref 12.3–15.4)
GLOBULIN UR ELPH-MCNC: 2.4 GM/DL
GLUCOSE BLDC GLUCOMTR-MCNC: 104 MG/DL (ref 70–130)
GLUCOSE BLDC GLUCOMTR-MCNC: 125 MG/DL (ref 70–130)
GLUCOSE SERPL-MCNC: 148 MG/DL (ref 65–99)
HCT VFR BLD AUTO: 37.7 % (ref 37.5–51)
HGB BLD-MCNC: 12.5 G/DL (ref 13–17.7)
IMM GRANULOCYTES # BLD AUTO: 0.05 10*3/MM3 (ref 0–0.05)
IMM GRANULOCYTES NFR BLD AUTO: 0.6 % (ref 0–0.5)
LYMPHOCYTES # BLD AUTO: 1.87 10*3/MM3 (ref 0.7–3.1)
LYMPHOCYTES NFR BLD AUTO: 21.9 % (ref 19.6–45.3)
MCH RBC QN AUTO: 32.4 PG (ref 26.6–33)
MCHC RBC AUTO-ENTMCNC: 33.2 G/DL (ref 31.5–35.7)
MCV RBC AUTO: 97.7 FL (ref 79–97)
MONOCYTES # BLD AUTO: 0.57 10*3/MM3 (ref 0.1–0.9)
MONOCYTES NFR BLD AUTO: 6.7 % (ref 5–12)
NEUTROPHILS NFR BLD AUTO: 5.73 10*3/MM3 (ref 1.7–7)
NEUTROPHILS NFR BLD AUTO: 67.2 % (ref 42.7–76)
NRBC BLD AUTO-RTO: 0 /100 WBC (ref 0–0.2)
PLATELET # BLD AUTO: 225 10*3/MM3 (ref 140–450)
PMV BLD AUTO: 12.2 FL (ref 6–12)
POTASSIUM SERPL-SCNC: 4.4 MMOL/L (ref 3.5–5.2)
PROT SERPL-MCNC: 6.1 G/DL (ref 6–8.5)
QT INTERVAL: 380 MS
QT INTERVAL: 404 MS
QTC INTERVAL: 433 MS
QTC INTERVAL: 451 MS
RBC # BLD AUTO: 3.86 10*6/MM3 (ref 4.14–5.8)
SODIUM SERPL-SCNC: 135 MMOL/L (ref 136–145)
TROPONIN T SERPL-MCNC: 0.03 NG/ML (ref 0–0.03)
WBC NRBC COR # BLD: 8.53 10*3/MM3 (ref 3.4–10.8)

## 2022-06-02 PROCEDURE — 82962 GLUCOSE BLOOD TEST: CPT

## 2022-06-02 PROCEDURE — 80053 COMPREHEN METABOLIC PANEL: CPT | Performed by: INTERNAL MEDICINE

## 2022-06-02 PROCEDURE — G0378 HOSPITAL OBSERVATION PER HR: HCPCS

## 2022-06-02 PROCEDURE — 85025 COMPLETE CBC W/AUTO DIFF WBC: CPT | Performed by: INTERNAL MEDICINE

## 2022-06-02 PROCEDURE — 99214 OFFICE O/P EST MOD 30 MIN: CPT | Performed by: NURSE PRACTITIONER

## 2022-06-02 PROCEDURE — 84484 ASSAY OF TROPONIN QUANT: CPT | Performed by: INTERNAL MEDICINE

## 2022-06-02 PROCEDURE — 93005 ELECTROCARDIOGRAM TRACING: CPT | Performed by: INTERNAL MEDICINE

## 2022-06-02 PROCEDURE — 93010 ELECTROCARDIOGRAM REPORT: CPT | Performed by: INTERNAL MEDICINE

## 2022-06-02 RX ORDER — LISINOPRIL 40 MG/1
40 TABLET ORAL DAILY
COMMUNITY
End: 2022-06-02 | Stop reason: HOSPADM

## 2022-06-02 RX ORDER — DICLOFENAC SODIUM 75 MG/1
75 TABLET, DELAYED RELEASE ORAL 2 TIMES DAILY
COMMUNITY
End: 2022-07-01

## 2022-06-02 RX ORDER — METFORMIN HYDROCHLORIDE 500 MG/1
1000 TABLET, EXTENDED RELEASE ORAL
COMMUNITY
End: 2022-07-01

## 2022-06-02 RX ORDER — LISINOPRIL 10 MG/1
10 TABLET ORAL
Qty: 30 TABLET | Refills: 1 | Status: SHIPPED | OUTPATIENT
Start: 2022-06-03 | End: 2022-06-08

## 2022-06-02 RX ORDER — GABAPENTIN 100 MG/1
100 CAPSULE ORAL 3 TIMES DAILY
COMMUNITY
End: 2022-06-02

## 2022-06-02 RX ORDER — GABAPENTIN 100 MG/1
CAPSULE ORAL
Qty: 90 CAPSULE | Refills: 0 | Status: SHIPPED | OUTPATIENT
Start: 2022-06-02 | End: 2022-09-26

## 2022-06-02 RX ORDER — ISOSORBIDE MONONITRATE 30 MG/1
30 TABLET, EXTENDED RELEASE ORAL
Qty: 30 TABLET | Refills: 1 | Status: SHIPPED | OUTPATIENT
Start: 2022-06-03 | End: 2023-03-07

## 2022-06-02 RX ADMIN — RANOLAZINE 500 MG: 500 TABLET, FILM COATED, EXTENDED RELEASE ORAL at 10:01

## 2022-06-02 RX ADMIN — METOPROLOL SUCCINATE 25 MG: 25 TABLET, EXTENDED RELEASE ORAL at 10:01

## 2022-06-02 RX ADMIN — ISOSORBIDE MONONITRATE 30 MG: 30 TABLET, EXTENDED RELEASE ORAL at 10:01

## 2022-06-02 RX ADMIN — LISINOPRIL 10 MG: 10 TABLET ORAL at 10:01

## 2022-06-02 RX ADMIN — ASPIRIN 81 MG: 81 TABLET, COATED ORAL at 08:23

## 2022-06-02 RX ADMIN — GABAPENTIN 100 MG: 100 CAPSULE ORAL at 08:23

## 2022-06-02 RX ADMIN — TICAGRELOR 90 MG: 90 TABLET ORAL at 08:23

## 2022-06-02 NOTE — OUTREACH NOTE
Prep Survey    Flowsheet Row Responses   [facility] facility patient discharged from? [facility]   Is LACE score < 7 ? Yes   Emergency Room discharge w/ pulse ox? No   Eligibility [facility]   Date of Admission 06/01/22   Date of Discharge 06/02/22   Discharge Disposition Home or Self Care   Discharge diagnosis CARDIAC CATHETERIZATION  CAD   Does the patient have one of the following disease processes/diagnoses(primary or secondary)? Other   Does the patient have Home health ordered? No   Is there a DME ordered? No   Prep survey completed? Yes          RICHMOND WILLIAM - Registered Nurse

## 2022-06-02 NOTE — CONSULTS
"[facility] HEART GROUP CONSULT NOTE    Referring Provider: Kayli Sevilla DO    Reason for Consultation: \" Recent PCI with elevated troponin and syncope\"    Chief Complaint   Patient presents with   • Syncope       Subjective .     History of present illness:  Ashley Bradley is a 62 y.o. male with a known PMH significant for coronary artery disease status post recent hospitalization with PTCA/PCI to left circumflex coronary artery as well as PTCA to the distal RCA and unsuccessful attempt for stent placement to the distal right coronary artery.  He had optimizations of medications with the addition of dual antiplatelet therapy, statin therapy, nitrate therapy, beta-blocker therapy.  He had previously been on high-dose ACE inhibitor for management of his hypertension.  Since being on all his new medications he has reported being lightheaded and dizzy at home.  He has fallen at home.  He had a near syncopal episode prior to his arrival to the emergency department yesterday.  He is displeased with his most recent symptoms that he feels are all related to his new prescriptions.  He actually went to see his primary care doctor 2 days ago due to his symptoms however was disappointed because no medication changes were made at that time.  Subsequently, as previously mentioned, the patient had a near syncopal episode yesterday and then followed up in the emergency department for further evaluation.  He was admitted overnight for observation found to be hypotensive and his medications have been resumed this morning at lower doses.    Upon assessment of the patient he was lying in bed awake, alert, oriented.  He is frustrated.  He states he does not want to feel like he has been feeling and certainly does not want to feel like he did yesterday.  We discussed medication dose adjustments have been made this morning and he verbalized understanding and was not aware that his doses had already been adjusted.  He had " not taken his morning medicines from his nurse as he was unaware that the dose changes have been made.  He had been told last night by provider that his lisinopril would likely be stopped and so when this was brought to him this morning he was concerned.    He denies any chest pain, chest pressure, shortness of breath, dyspnea on exertion.  He denies any palpitations.  His heart rates have been well controlled.  His blood pressure was in the 80s and 90s overnight but has improved since his admission.  Most recent blood pressure this morning was 131/70.  He has had no medications.    He was admitted to the hospitalist service and cardiology was consulted.  The hospitalist service resumed his medications and made the dose adjustments prior to my evaluation.    History  Past Medical History:   Diagnosis Date   • Diabetes mellitus (HCC)    • Diverticula of colon    • GERD (gastroesophageal reflux disease)    • Hypertension    ,   Past Surgical History:   Procedure Laterality Date   • CARDIAC CATHETERIZATION     • CARDIAC CATHETERIZATION Right 5/23/2022    Procedure: Left Heart  WITH POSS INTERVENTION, RIGHT RADIAL, US GUIDED;  Surgeon: Earl Flood DO;  Location:  [facility] CATH INVASIVE LOCATION;  Service: Cardiovascular;  Laterality: Right;   • CARDIAC CATHETERIZATION N/A 5/24/2022    Procedure: Percutaneous Coronary Intervention;  Surgeon: Earl Flood DO;  Location: [facility] CATH INVASIVE LOCATION;  Service: Cardiovascular;  Laterality: N/A;   • COLONOSCOPY  11/01/2010    snare 70, 60, rectum tics recall 5 years   • COLONOSCOPY Left 11/03/2016    Procedure: COLONOSCOPY WITH ANESTHESIA;  Surgeon: Lisandro Holt MD;  Location: [facility] ENDOSCOPY;  Service:    • HEMORRHOIDECTOMY     • HERNIA REPAIR     • TONSILLECTOMY     ,   Family History   Problem Relation Age of Onset   • Ulcerative colitis Mother    • Valvular heart disease Mother    • Heart murmur Father    • Colon cancer Neg Hx    ,   Social  History     Tobacco Use   • Smoking status: Former Smoker     Packs/day: 0.25     Years: 40.00     Pack years: 10.00     Types: Cigarettes   • Smokeless tobacco: Current User     Types: Snuff   Vaping Use   • Vaping Use: Never used   Substance Use Topics   • Alcohol use: No   • Drug use: No   ,     Medications  Current Facility-Administered Medications   Medication Dose Route Frequency Provider Last Rate Last Admin   • acetaminophen (TYLENOL) tablet 650 mg  650 mg Oral Q4H PRN Kayli Sevilla DO       • aspirin EC tablet 81 mg  81 mg Oral Daily Kayli Sevilla DO   81 mg at 06/02/22 0823   • atorvastatin (LIPITOR) tablet 40 mg  40 mg Oral Nightly Kayli Sevilla DO   40 mg at 06/01/22 2302   • dextrose (D50W) (25 g/50 mL) IV injection 25 g  25 g Intravenous Q15 Min PRN Kayli Sevilla DO       • dextrose (GLUTOSE) oral gel 15 g  15 g Oral Q15 Min PRN Kayli Sevilla DO       • gabapentin (NEURONTIN) capsule 100 mg  100 mg Oral TID Kayli Sevilla DO   100 mg at 06/02/22 0823   • glucagon (human recombinant) (GLUCAGEN DIAGNOSTIC) injection 1 mg  1 mg Intramuscular Q15 Min PRN Kayli Sevilla DO       • Insulin Lispro (humaLOG) injection 2-7 Units  2-7 Units Subcutaneous TID AC Kayli Sevilla DO       • isosorbide mononitrate (IMDUR) 24 hr tablet 30 mg  30 mg Oral Q24H Kayli Sevilla DO       • lisinopril (PRINIVIL,ZESTRIL) tablet 10 mg  10 mg Oral Q24H Kayli Sevilla DO       • metoprolol succinate XL (TOPROL-XL) 24 hr tablet 25 mg  25 mg Oral Daily Kayli Sevilla DO       • nitroglycerin (NITROSTAT) SL tablet 0.4 mg  0.4 mg Sublingual Q5 Min PRN Kayli Sevilla DO       • ondansetron (ZOFRAN) injection 4 mg  4 mg Intravenous Q6H PRN Kayli Sevilla DO       • ranolazine (RANEXA) 12 hr tablet 500 mg  500 mg Oral Q12H Kayli Sevilla DO   500 mg at 06/01/22 6394   • sodium chloride 0.9 % flush 10 mL  10 mL Intravenous Q12H Kayli Sevilla DO   10 mL at  06/01/22 2302   • sodium chloride 0.9 % flush 10 mL  10 mL Intravenous PRN Kayli Sevilla DO       • ticagrelor (BRILINTA) tablet 90 mg  90 mg Oral BID Kayli Sevilla DO   90 mg at 06/02/22 0823       Allergies:  Penicillamine and Penicillins    Review of Systems  Review of Systems   Constitutional: Negative for fever.   Cardiovascular: Positive for near-syncope. Negative for chest pain, dyspnea on exertion, irregular heartbeat, leg swelling, orthopnea, palpitations, paroxysmal nocturnal dyspnea and syncope.   Respiratory: Negative for cough, shortness of breath and wheezing.    Hematologic/Lymphatic: Negative for bleeding problem.   Musculoskeletal: Positive for falls.   Gastrointestinal: Negative for nausea and vomiting.   Neurological: Positive for dizziness, light-headedness and weakness.   Psychiatric/Behavioral: Negative for altered mental status.       Objective     Physical Exam:  Patient Vitals for the past 24 hrs:   BP Temp Temp src Pulse Resp SpO2 Height Weight   06/02/22 0700 131/70 98 °F (36.7 °C) Oral 82 18 97 % -- --   06/02/22 0526 109/68 98.2 °F (36.8 °C) Oral 89 18 97 % -- --   06/02/22 0041 105/63 98 °F (36.7 °C) Oral 87 16 93 % -- --   06/01/22 2136 114/64 97.7 °F (36.5 °C) Oral 83 16 96 % -- 102 kg (224 lb)   06/01/22 2100 109/68 -- -- 77 -- 94 % -- --   06/01/22 2040 125/70 -- -- 78 14 98 % -- --   06/01/22 2000 103/64 -- -- 78 -- 97 % -- --   06/01/22 1900 98/63 -- -- 79 -- 97 % -- --   06/01/22 1853 99/65 -- -- 84 -- -- -- --   06/01/22 1852 (!) 88/58 -- -- 90 -- -- -- --   06/01/22 1850 94/68 -- -- 75 -- -- -- --   06/01/22 1846 90/65 -- -- 75 -- 96 % -- --   06/01/22 1831 (!) 86/63 -- -- 77 -- 95 % -- --   06/01/22 1817 (!) 87/66 -- -- 81 -- -- -- --   06/01/22 1813 90/62 -- -- 83 -- -- -- --   06/01/22 1811 (!) 89/62 -- -- 82 -- -- -- --   06/01/22 1803 (!) 88/60 -- -- 82 -- 91 % -- --   06/01/22 1748 92/64 -- -- 83 -- 91 % -- --   06/01/22 1733 102/66 -- -- 74 -- 95 % -- --  "  06/01/22 1728 102/66 97.8 °F (36.6 °C) -- 76 16 95 % 182.9 cm (72\") 104 kg (230 lb)     Vitals reviewed.   Constitutional:       General: Awake. Not in acute distress.     Appearance: Normal appearance. Well-developed, well-groomed and not in distress. Obese. Chronically ill-appearing.   HENT:      Head: Normocephalic and atraumatic.   Pulmonary:      Effort: Pulmonary effort is normal.      Breath sounds: Normal breath sounds. No wheezing. No rhonchi. No rales.   Cardiovascular:      Normal rate. Regular rhythm.      Murmurs: There is a systolic murmur.      No gallop.   Edema:     Peripheral edema absent.   Abdominal:      Palpations: Abdomen is soft.   Musculoskeletal:      Cervical back: Normal range of motion and neck supple. Skin:     General: Skin is warm and dry.   Neurological:      Mental Status: Alert, oriented to person, place, and time and oriented to person, place and time.   Psychiatric:         Attention and Perception: Attention normal.         Speech: Speech normal.         Behavior: Behavior is cooperative.         Cognition and Memory: Cognition and memory normal.         Results Review:   I reviewed the patient's new clinical results.    Lab Results (last 72 hours)     Procedure Component Value Units Date/Time    Troponin [[id number]]  (Abnormal) Collected: 06/02/22 0756    Specimen: Blood Updated: 06/02/22 0853     Troponin T 0.032 ng/mL     Narrative:      Troponin T Reference Range:  <= 0.03 ng/mL-   Negative for AMI  >0.03 ng/mL-     Abnormal for myocardial necrosis.  Clinicians would have to utilize clinical acumen, EKG, Troponin and serial changes to determine if it is an Acute Myocardial Infarction or myocardial injury due to an underlying chronic condition.       Results may be falsely decreased if patient taking Biotin.      Comprehensive Metabolic Panel [[id number]]  (Abnormal) Collected: 06/02/22 0756    Specimen: Blood Updated: 06/02/22 0846     Glucose 148 mg/dL      BUN 16 mg/dL  "     Creatinine 1.03 mg/dL      Sodium 135 mmol/L      Potassium 4.4 mmol/L      Chloride 102 mmol/L      CO2 26.0 mmol/L      Calcium 9.2 mg/dL      Total Protein 6.1 g/dL      Albumin 3.70 g/dL      ALT (SGPT) 22 U/L      AST (SGOT) 17 U/L      Alkaline Phosphatase 104 U/L      Total Bilirubin 0.4 mg/dL      Globulin 2.4 gm/dL      A/G Ratio 1.5 g/dL      BUN/Creatinine Ratio 15.5     Anion Gap 7.0 mmol/L      eGFR 81.6 mL/min/1.73      Comment: National Kidney Foundation and American Society of Nephrology (ASN) Task Force recommended calculation based on the Chronic Kidney Disease Epidemiology Collaboration (CKD-EPI) equation refit without adjustment for race.       Narrative:      GFR Normal >60  Chronic Kidney Disease <60  Kidney Failure <15      CBC Auto Differential [[id number]]  (Abnormal) Collected: 06/02/22 0756    Specimen: Blood Updated: 06/02/22 0822     WBC 8.53 10*3/mm3      RBC 3.86 10*6/mm3      Hemoglobin 12.5 g/dL      Hematocrit 37.7 %      MCV 97.7 fL      MCH 32.4 pg      MCHC 33.2 g/dL      RDW 12.1 %      RDW-SD 43.5 fl      MPV 12.2 fL      Platelets 225 10*3/mm3      Neutrophil % 67.2 %      Lymphocyte % 21.9 %      Monocyte % 6.7 %      Eosinophil % 2.7 %      Basophil % 0.9 %      Immature Grans % 0.6 %      Neutrophils, Absolute 5.73 10*3/mm3      Lymphocytes, Absolute 1.87 10*3/mm3      Monocytes, Absolute 0.57 10*3/mm3      Eosinophils, Absolute 0.23 10*3/mm3      Basophils, Absolute 0.08 10*3/mm3      Immature Grans, Absolute 0.05 10*3/mm3      nRBC 0.0 /100 WBC     POC Glucose Once [[id number]]  (Normal) Collected: 06/02/22 0738    Specimen: Blood Updated: 06/02/22 0750     Glucose 125 mg/dL      Comment: : [id number] [place] ([place]) KatieMeter ID: [id number]       COVID PRE-OP / PRE-PROCEDURE SCREENING ORDER (NO ISOLATION) - Swab, Nasal Cavity [[id number]]  (Normal) Collected: 06/01/22 2055    Specimen: Swab from Nasal Cavity Updated: 06/01/22 2139    Narrative:      The following  orders were created for panel order COVID PRE-OP / PRE-PROCEDURE SCREENING ORDER (NO ISOLATION) - Swab, Nasal Cavity.  Procedure                               Abnormality         Status                     ---------                               -----------         ------                     COVID-19,[facility] [employer] IN-GOLDY...[[id number]]  Normal              Final result                 Please view results for these tests on the individual orders.    COVID-19,[facility] [employer] IN-HOUSE,Nasal Swab No Transport Media 3-4 HR TAT - Swab, Nasal Cavity [[id number]]  (Normal) Collected: 06/01/22 2055    Specimen: Swab from Nasal Cavity Updated: 06/01/22 2139     COVID19 Not Detected    Narrative:      Fact sheet for providers: https://www.fda.gov/media/980463/download     Fact sheet for patients: https://www.fda.gov/media/027795/download    Test performed by PCR.    Consider negative results in combination with clinical observations, patient history, and epidemiological information.  Fact sheet for providers: https://www.fda.gov/media/040786/download     Fact sheet for patients: https://www.fda.gov/media/915450/download    Test performed by PCR.    Consider negative results in combination with clinical observations, patient history, and epidemiological information.    Troponin [[id number]]  (Abnormal) Collected: 06/01/22 2040    Specimen: Blood Updated: 06/01/22 2110     Troponin T 0.063 ng/mL     Narrative:      Troponin T Reference Range:  <= 0.03 ng/mL-   Negative for AMI  >0.03 ng/mL-     Abnormal for myocardial necrosis.  Clinicians would have to utilize clinical acumen, EKG, Troponin and serial changes to determine if it is an Acute Myocardial Infarction or myocardial injury due to an underlying chronic condition.       Results may be falsely decreased if patient taking Biotin.      Troponin [[id number]]  (Abnormal) Collected: 06/01/22 1808    Specimen: Blood Updated: 06/01/22 1923     Troponin T 0.054 ng/mL     Narrative:      Troponin  T Reference Range:  <= 0.03 ng/mL-   Negative for AMI  >0.03 ng/mL-     Abnormal for myocardial necrosis.  Clinicians would have to utilize clinical acumen, EKG, Troponin and serial changes to determine if it is an Acute Myocardial Infarction or myocardial injury due to an underlying chronic condition.       Results may be falsely decreased if patient taking Biotin.      CBC & Differential [[id number]]  (Abnormal) Collected: 06/01/22 1808    Specimen: Blood Updated: 06/01/22 1921    Narrative:      The following orders were created for panel order CBC & Differential.  Procedure                               Abnormality         Status                     ---------                               -----------         ------                     CBC Auto Differential[[id number]]        Abnormal            Final result                 Please view results for these tests on the individual orders.    CBC Auto Differential [[id number]]  (Abnormal) Collected: 06/01/22 1808    Specimen: Blood Updated: 06/01/22 1921     WBC 9.17 10*3/mm3      RBC 4.09 10*6/mm3      Hemoglobin 13.8 g/dL      Hematocrit 39.4 %      MCV 96.3 fL      MCH 33.7 pg      MCHC 35.0 g/dL      RDW 12.1 %      RDW-SD 42.4 fl      MPV 12.2 fL      Platelets 265 10*3/mm3      Neutrophil % 67.6 %      Lymphocyte % 21.0 %      Monocyte % 7.2 %      Eosinophil % 2.6 %      Basophil % 1.1 %      Immature Grans % 0.5 %      Neutrophils, Absolute 6.19 10*3/mm3      Lymphocytes, Absolute 1.93 10*3/mm3      Monocytes, Absolute 0.66 10*3/mm3      Eosinophils, Absolute 0.24 10*3/mm3      Basophils, Absolute 0.10 10*3/mm3      Immature Grans, Absolute 0.05 10*3/mm3      nRBC 0.0 /100 WBC     Comprehensive Metabolic Panel [[id number]]  (Abnormal) Collected: 06/01/22 1808    Specimen: Blood Updated: 06/01/22 1919     Glucose 143 mg/dL      BUN 15 mg/dL      Creatinine 1.14 mg/dL      Sodium 136 mmol/L      Potassium 4.1 mmol/L      Chloride 100 mmol/L      CO2 22.0 mmol/L       Calcium 9.0 mg/dL      Total Protein 6.3 g/dL      Albumin 3.80 g/dL      ALT (SGPT) 25 U/L      AST (SGOT) 22 U/L      Alkaline Phosphatase 86 U/L      Total Bilirubin 0.6 mg/dL      Globulin 2.5 gm/dL      A/G Ratio 1.5 g/dL      BUN/Creatinine Ratio 13.2     Anion Gap 14.0 mmol/L      eGFR 72.3 mL/min/1.73      Comment: National Kidney Foundation and American Society of Nephrology (ASN) Task Force recommended calculation based on the Chronic Kidney Disease Epidemiology Collaboration (CKD-EPI) equation refit without adjustment for race.       Narrative:      GFR Normal >60  Chronic Kidney Disease <60  Kidney Failure <15      BNP [[id number]]  (Normal) Collected: 06/01/22 1808    Specimen: Blood Updated: 06/01/22 1919     proBNP 61.0 pg/mL     Narrative:      Among patients with dyspnea, NT-proBNP is highly sensitive for the detection of acute congestive heart failure. In addition NT-proBNP of <300 pg/ml effectively rules out acute congestive heart failure with 99% negative predictive value.    Results may be falsely decreased if patient taking Biotin.      D-dimer, Quantitative [[id number]]  (Normal) Collected: 06/01/22 1808    Specimen: Blood Updated: 06/01/22 1908     D-Dimer, Quantitative 0.30 MCGFEU/mL     Narrative:      Reference Range is 0-0.50 MCGFEU/mL. However, results <0.50 MCGFEU/mL tends to rule out DVT or PE. Results >0.50 MCGFEU/mL are not useful in predicting absence or presence of DVT or PE.            Results for orders placed during the hospital encounter of 05/18/22    Adult Transthoracic Echo Complete W/ Cont if Necessary Per Protocol    Interpretation Summary  · Left ventricular ejection fraction appears to be 61 - 65%. Left ventricular systolic function is normal.  · Left ventricular diastolic function was normal.  · Normal right ventricular cavity size and systolic function noted.  · There is thickening of the aortic valve. No aortic valve regurgitation is present. No hemodynamically  significant aortic valve stenosis is present.  · The mitral valve is structurally normal with no regurgitation or significant stenosis present.  · No evidence of pulmonary hypertension is present.        Assessment     1.  Near syncope likely related to #2  2.  Hypotension   3.  Elevated troponin, type II elevation in the absence of chest pain with previous PCI this month.  4.  Coronary artery disease, native artery  5.  Hyperlipidemia  6.  Diabetes mellitus, type II  7.  Tobacco abuse  8.  Obesity    Plan     Cardiology was consulted as the patient was recently started on new medications by his primary cardiologist, Dr. Flood, and subsequently has had symptomatic hypotension with near syncope.  He had seen his primary care physician without medication adjustments and ultimately ended up in the emergency department yesterday after a near syncopal episode when eating out at a restaurant with his wife.    He was found to be hypotensive.  He was admitted overnight with medication adjustments made by the primary service.    I agree with the changes made with the primary service.  The patient appears to be overmedicated with new medications that had recently been started.  The starting of his new medications correlate with his symptoms of lightheadedness, dizziness, falls, near syncope.  The patient would benefit from antianginal therapies for management of his stable coronary artery disease however dose adjustments seem appropriate.  Ranexa should not affect his blood pressure and likely can be resumed as previously ordered.  We will continue with reduced dose of lisinopril, reduce dose of isosorbide, continue with current dose low-dose of Toprol-XL.    I discussed dose changes with the patient today who is agreeable to take his medications and further monitor his blood pressure and symptoms and make further changes as necessary.    -Continue current medications as ordered.  If the patient has further symptoms of  lightheadedness, dizziness, or hypotension we will discontinue isosorbide mononitrate and reevaluate.  - Continue to monitor blood pressure    Continue current management from the primary service.  Completely agree with the changes that were already made.  Cardiology will sign off.  Please recall if needed.        Electronically signed by MICKEY Bazan, 06/02/22, 9:17 AM CDT.      Please note this cardiology consultation note is the result of a face to face consultation with the patient, in addition to reviewing medical records at length by myself, MICKEY Bazan.       Time: 40 minutes

## 2022-06-02 NOTE — PLAN OF CARE
Goal Outcome Evaluation:              Outcome Evaluation: Pt admitted through the ED for syncope. VSS. No c/o pain or vertigo. Troponin elevated. SpO2 mid 90s on room air. Sinus 71-91.

## 2022-06-02 NOTE — ED PROVIDER NOTES
Subjective   History of Present Illness    Review of Systems    Past Medical History:   Diagnosis Date   • Diabetes mellitus (HCC)    • Diverticula of colon    • GERD (gastroesophageal reflux disease)    • Hypertension        Allergies   Allergen Reactions   • Penicillamine Unknown - High Severity   • Penicillins        Past Surgical History:   Procedure Laterality Date   • CARDIAC CATHETERIZATION     • CARDIAC CATHETERIZATION Right 5/23/2022    Procedure: Left Heart  WITH POSS INTERVENTION, RIGHT RADIAL, US GUIDED;  Surgeon: Earl Flood DO;  Location:  [facility] CATH INVASIVE LOCATION;  Service: Cardiovascular;  Laterality: Right;   • CARDIAC CATHETERIZATION N/A 5/24/2022    Procedure: Percutaneous Coronary Intervention;  Surgeon: Earl Flood DO;  Location:  [facility] CATH INVASIVE LOCATION;  Service: Cardiovascular;  Laterality: N/A;   • COLONOSCOPY  11/01/2010    snare 70, 60, rectum tics recall 5 years   • COLONOSCOPY Left 11/03/2016    Procedure: COLONOSCOPY WITH ANESTHESIA;  Surgeon: Lisandro Holt MD;  Location:  [facility] ENDOSCOPY;  Service:    • HEMORRHOIDECTOMY     • HERNIA REPAIR     • TONSILLECTOMY         Family History   Problem Relation Age of Onset   • Ulcerative colitis Mother    • Valvular heart disease Mother    • Heart murmur Father    • Colon cancer Neg Hx        Social History     Socioeconomic History   • Marital status: Single   Tobacco Use   • Smoking status: Former Smoker     Packs/day: 0.25     Years: 40.00     Pack years: 10.00     Types: Cigarettes   • Smokeless tobacco: Current User     Types: Snuff   Vaping Use   • Vaping Use: Never used   Substance and Sexual Activity   • Alcohol use: No   • Drug use: No   • Sexual activity: Defer           Objective   Physical Exam    Procedures           ED Course  ED Course as of 06/01/22 2027 Wed Jun 01, 2022 1921 Taken over from Dr Khoury  [TS]   2015 Patient had recent cardiac catheterization with difficulty of stenting  the RCA.  He was put on multitude of medications today had a near syncope/syncopal episode at a restaurant he has had a similar episode in before that.  Came to the ER for evaluation. [TS]   2015 Years Algorithm for Pulmonary Embolus  To be used in hemodynamically stable patient >18 years old    No signs of DVT are present, there is no hemoptysis, PE is not the most likely diagnosis and the D-dimer is not greater or equal to 1000 ng/mL. Therefore PE is excluded . The Years algorithm rules out PE (0.43 % with symptomatic VTE during 3-month follow-up) [TS]   2015 I have discussed this case with Dr. Singleton the patient is chest pain-free probably hypotension is the etiology of his near syncopal episodes on account of the multitude of medication that he is on the recommendation was admit to the hospital service watch overnight DC some of the medicine and see how he does. [TS]      ED Course User Index  [TS] Moisés Dempsey MD                                                 [facility]    Final diagnoses:   Syncope and collapse   Coronary artery disease involving native heart without angina pectoris, unspecified vessel or lesion type       ED Disposition  ED Disposition     ED Disposition   Decision to Admit    Condition   --    Comment   Level of Care: Telemetry [5]   Diagnosis: Syncope and collapse [780.2.ICD-9-CM]   Admitting Physician: LY HOFF [[id number]]   Attending Physician: LY HOFF [[id number]]               No follow-up provider specified.       Medication List      No changes were made to your prescriptions during this visit.          Moisés Dempsey MD  06/01/22 2016       Moisés Dempsey MD  06/01/22 2027

## 2022-06-02 NOTE — H&P
"    [facility] Medicine Services  HISTORY AND PHYSICAL    Date of Admission: 6/1/2022  Primary Care Physician: Jian Rankin MD    Subjective     Chief Complaint: \"Syncope\"    History of Present Illness  62-year-old male presents to the emergency department after an episode of [place].  The patient states he did not feel like he passed out.  He says that he was eating at a restaurant this evening.  He was putting his food and had to go blocks and got dizzy.  He stopped and took a break.  On the second try he got extreme dizziness and his head started spinning and he fell against the wall.  He says that his wife said that his eyes rolled in the back of his head, he says that he doubts this.  He does remember leaning against the wall.  He had no chest pain.  He reports that his breathing is appropriate.  He has had no acute changes in his bowel or kidneys.  He says that last Wednesday, he was admitted due to abnormal nuclear stress test, CAD, and stent placement to the distal right coronary artery.  Of note, cardiology was consulted for admission, and declined.  The patient tells me that he has had the addition of 3 new medications, and feels that he is on too much medication and his blood pressure is too low.  Blood pressure on arrival was 88/60.  On further questioning, the patient states that he has had dizzy spells since being discharged.  He states 1 occurred while he was feeding his [place] fish.  He states that he has had at least 2-3 dizzy spells, but the reason he presented for this past episode was because it occurred in a public place.        Review of Systems   Dizziness    Otherwise complete ROS reviewed and negative except as mentioned in the HPI.    Past Medical History:   Past Medical History:   Diagnosis Date   • Diabetes mellitus (HCC)    • Diverticula of colon    • GERD (gastroesophageal reflux disease)    • Hypertension      Past Surgical History:  Past " Surgical History:   Procedure Laterality Date   • CARDIAC CATHETERIZATION     • CARDIAC CATHETERIZATION Right 5/23/2022    Procedure: Left Heart  WITH POSS INTERVENTION, RIGHT RADIAL, US GUIDED;  Surgeon: Earl Flood DO;  Location:  [facility] CATH [facility]ASIVE [facility];  Service: Cardiovascular;  Laterality: Right;   • CARDIAC CATHETERIZATION N/A 5/24/2022    Procedure: Percutaneous Coronary Intervention;  Surgeon: Earl Flood DO;  Location:  [facility] CATH INVASIVE LOCATION;  Service: Cardiovascular;  Laterality: N/A;   • COLONOSCOPY  11/01/2010    snare 70, 60, rectum tics recall 5 years   • COLONOSCOPY Left 11/03/2016    Procedure: COLONOSCOPY WITH ANESTHESIA;  Surgeon: Lisandro Holt MD;  Location:  [facility] ENDOSCOPY;  Service:    • HEMORRHOIDECTOMY     • HERNIA REPAIR     • TONSILLECTOMY       Social History:  reports that he has quit smoking. His smoking use included cigarettes. He has a 10.00 pack-year smoking history. His smokeless tobacco use includes snuff. He reports that he does not drink alcohol and does not use drugs.    Family History: family history includes Heart murmur in his father; Ulcerative colitis in his mother; Valvular heart disease in his mother.       Allergies:  Allergies   Allergen Reactions   • Penicillamine Unknown - High Severity   • Penicillins        Medications:  Prior to Admission medications    Medication Sig Start Date End Date Taking? Authorizing Provider   aspirin 81 MG EC tablet Take 1 tablet by mouth Daily. 5/25/22   Earl Flood DO   atorvastatin (LIPITOR) 40 MG tablet Take 1 tablet by mouth Daily. 4/5/22   Earl Flood DO   Continuous Blood Gluc  (FREESTYLE MAURI 14 DAY READER) device 1 each Daily. 11/8/19   Jian Rankin MD   Continuous Blood Gluc Sensor (FREESTYLE MAURI 14 DAY SENSOR) misc 1 each Every 14 (Fourteen) Days. 11/8/19   Jian Rankin MD   diclofenac (VOLTAREN) 75 MG EC tablet TAKE 1 TABLET  "BY MOUTH TWICE A DAY 3/29/22   Jian Rankin MD   gabapentin (NEURONTIN) 100 MG capsule TAKE 1 CAPSULE BY MOUTH THREE TIMES A DAY 3/30/22   Jian Rankin MD   glucose blood test strip onetouch test strips-dx e11.9- test twice daily 10/25/21   Jian Rankin MD   isosorbide mononitrate (IMDUR) 60 MG 24 hr tablet Take 1 tablet by mouth Daily. 5/25/22   Earl Flood DO   lisinopril (PRINIVIL,ZESTRIL) 40 MG tablet TAKE 1 TABLET BY MOUTH EVERY DAY 3/29/22   Jian Rankin MD   metFORMIN ER (GLUCOPHAGE-XR) 500 MG 24 hr tablet TAKE 2 TABLETS BY MOUTH DAILY WITH BREAKFAST. 3/29/22   Jian Rankin MD   metoprolol succinate XL (TOPROL-XL) 25 MG 24 hr tablet Take 1 tablet by mouth Daily. 5/20/22   Earl Flood DO   nitroglycerin (NITROSTAT) 0.4 MG SL tablet 1 under the tongue as needed for angina, may repeat q5mins for up three doses 5/20/22   Earl Flood DO   ONETOUCH DELICA LANCETS FINE misc Use to test blood sugar twice daily 4/9/19   Jian Rankin MD   ranolazine (RANEXA) 500 MG 12 hr tablet Take 1 tablet by mouth Every 12 (Twelve) Hours. 5/25/22   Earl Flood DO   Semaglutide (Rybelsus) 7 MG tablet Take 7 mg by mouth Daily. 12/30/21   Jian Rankin MD   ticagrelor (BRILINTA) 90 MG tablet tablet Take 1 tablet by mouth 2 (Two) Times a Day. 5/25/22   Earl Flood DO     I have utilized all available immediate resources to obtain, update, and review the patient's current medications.    Objective     Vital Signs: /68   Pulse 77   Temp 97.8 °F (36.6 °C)   Resp 14   Ht 182.9 cm (72\")   Wt 104 kg (230 lb)   SpO2 94%   BMI 31.19 kg/m²   Physical Exam  Vitals reviewed.   Constitutional:       Appearance: Normal appearance.      Comments: Patient using the cell phone without any difficulty or signs of acute distress.    HENT:      Head: Normocephalic and atraumatic.      Right Ear: " External ear normal.      Left Ear: External ear normal.      Nose: Nose normal.   Eyes:      General: No scleral icterus.     Conjunctiva/sclera: Conjunctivae normal.   Cardiovascular:      Rate and Rhythm: Normal rate and regular rhythm.      Heart sounds: Normal heart sounds.   Pulmonary:      Effort: Pulmonary effort is normal.      Breath sounds: Normal breath sounds.   Abdominal:      General: Bowel sounds are normal.      Palpations: Abdomen is soft.   Musculoskeletal:         General: No swelling or tenderness.      Cervical back: Normal range of motion and neck supple.   Skin:     General: Skin is warm and dry.   Neurological:      General: No focal deficit present.      Mental Status: He is alert.      Cranial Nerves: No cranial nerve deficit.   Psychiatric:         Mood and Affect: Mood normal.         Behavior: Behavior normal.       Results Reviewed:  Lab Results (last 24 hours)     Procedure Component Value Units Date/Time    Troponin [[id number]]  (Abnormal) Collected: 06/01/22 2040    Specimen: Blood Updated: 06/01/22 2110     Troponin T 0.063 ng/mL     Narrative:      Troponin T Reference Range:  <= 0.03 ng/mL-   Negative for AMI  >0.03 ng/mL-     Abnormal for myocardial necrosis.  Clinicians would have to utilize clinical acumen, EKG, Troponin and serial changes to determine if it is an Acute Myocardial Infarction or myocardial injury due to an underlying chronic condition.       Results may be falsely decreased if patient taking Biotin.      COVID PRE-OP / PRE-PROCEDURE SCREENING ORDER (NO ISOLATION) - Swab, Nasal Cavity [[id number]] Collected: 06/01/22 2055    Specimen: Swab from Nasal Cavity Updated: 06/01/22 2059    Narrative:      The following orders were created for panel order COVID PRE-OP / PRE-PROCEDURE SCREENING ORDER (NO ISOLATION) - Swab, Nasal Cavity.  Procedure                               Abnormality         Status                     ---------                                -----------         ------                     COVID-19,[facility] [employer] IN-GOLDY...[[id number]]                      In process                   Please view results for these tests on the individual orders.    COVID-19,[facility] [employer] IN-HOUSE,Nasal Swab No Transport Media 3-4 HR TAT - Swab, Nasal Cavity [[id number]] Collected: 06/01/22 2055    Specimen: Swab from Nasal Cavity Updated: 06/01/22 2059    Troponin [[id number]]  (Abnormal) Collected: 06/01/22 1808    Specimen: Blood Updated: 06/01/22 1923     Troponin T 0.054 ng/mL     Narrative:      Troponin T Reference Range:  <= 0.03 ng/mL-   Negative for AMI  >0.03 ng/mL-     Abnormal for myocardial necrosis.  Clinicians would have to utilize clinical acumen, EKG, Troponin and serial changes to determine if it is an Acute Myocardial Infarction or myocardial injury due to an underlying chronic condition.       Results may be falsely decreased if patient taking Biotin.      CBC & Differential [[id number]]  (Abnormal) Collected: 06/01/22 1808    Specimen: Blood Updated: 06/01/22 1921    Narrative:      The following orders were created for panel order CBC & Differential.  Procedure                               Abnormality         Status                     ---------                               -----------         ------                     CBC Auto Differential[[id number]]        Abnormal            Final result                 Please view results for these tests on the individual orders.    CBC Auto Differential [[id number]]  (Abnormal) Collected: 06/01/22 1808    Specimen: Blood Updated: 06/01/22 1921     WBC 9.17 10*3/mm3      RBC 4.09 10*6/mm3      Hemoglobin 13.8 g/dL      Hematocrit 39.4 %      MCV 96.3 fL      MCH 33.7 pg      MCHC 35.0 g/dL      RDW 12.1 %      RDW-SD 42.4 fl      MPV 12.2 fL      Platelets 265 10*3/mm3      Neutrophil % 67.6 %      Lymphocyte % 21.0 %      Monocyte % 7.2 %      Eosinophil % 2.6 %      Basophil % 1.1 %      Immature Grans % 0.5 %       Neutrophils, Absolute 6.19 10*3/mm3      Lymphocytes, Absolute 1.93 10*3/mm3      Monocytes, Absolute 0.66 10*3/mm3      Eosinophils, Absolute 0.24 10*3/mm3      Basophils, Absolute 0.10 10*3/mm3      Immature Grans, Absolute 0.05 10*3/mm3      nRBC 0.0 /100 WBC     Comprehensive Metabolic Panel [[id number]]  (Abnormal) Collected: 06/01/22 1808    Specimen: Blood Updated: 06/01/22 1919     Glucose 143 mg/dL      BUN 15 mg/dL      Creatinine 1.14 mg/dL      Sodium 136 mmol/L      Potassium 4.1 mmol/L      Chloride 100 mmol/L      CO2 22.0 mmol/L      Calcium 9.0 mg/dL      Total Protein 6.3 g/dL      Albumin 3.80 g/dL      ALT (SGPT) 25 U/L      AST (SGOT) 22 U/L      Alkaline Phosphatase 86 U/L      Total Bilirubin 0.6 mg/dL      Globulin 2.5 gm/dL      A/G Ratio 1.5 g/dL      BUN/Creatinine Ratio 13.2     Anion Gap 14.0 mmol/L      eGFR 72.3 mL/min/1.73      Comment: National Kidney Foundation and American Society of Nephrology (ASN) Task Force recommended calculation based on the Chronic Kidney Disease Epidemiology Collaboration (CKD-EPI) equation refit without adjustment for race.       Narrative:      GFR Normal >60  Chronic Kidney Disease <60  Kidney Failure <15      BNP [[id number]]  (Normal) Collected: 06/01/22 1808    Specimen: Blood Updated: 06/01/22 1919     proBNP 61.0 pg/mL     Narrative:      Among patients with dyspnea, NT-proBNP is highly sensitive for the detection of acute congestive heart failure. In addition NT-proBNP of <300 pg/ml effectively rules out acute congestive heart failure with 99% negative predictive value.    Results may be falsely decreased if patient taking Biotin.      [place] Draw [[id number]] Collected: 06/01/22 1808    Specimen: Blood Updated: 06/01/22 1917    Narrative:      The following orders were created for panel order [place] Draw.  Procedure                               Abnormality         Status                     ---------                               -----------          ------                     Green Top (Gel)[[id number]]                                  Final result               Lavender Top[[id number]]                                     Final result               Red Top[[id number]]                                          Final result               [place] Blood Culture Tone...[[id number]]                      Final result               Bass Top[[id number]]                                         In process                 Light Blue Top[[id number]]                                   Final result                 Please view results for these tests on the individual orders.    [place] Blood Culture Bottle Set [[id number]] Collected: 06/01/22 1808    Specimen: Blood from Arm, Right Updated: 06/01/22 1917     Extra Tube Hold for add-ons.     Comment: Auto resulted.       Green Top (Gel) [[id number]] Collected: 06/01/22 1808    Specimen: Blood Updated: 06/01/22 1917     Extra Tube Hold for add-ons.     Comment: Auto resulted.       Lavender Top [[id number]] Collected: 06/01/22 1808    Specimen: Blood Updated: 06/01/22 1917     Extra Tube hold for add-on     Comment: Auto resulted       Red Top [[id number]] Collected: 06/01/22 1808    Specimen: Blood Updated: 06/01/22 1917     Extra Tube Hold for add-ons.     Comment: Auto resulted.       Light Blue Top [[id number]] Collected: 06/01/22 1808    Specimen: Blood Updated: 06/01/22 1917     Extra Tube Hold for add-ons.     Comment: Auto resulted       D-dimer, Quantitative [[id number]]  (Normal) Collected: 06/01/22 1808    Specimen: Blood Updated: 06/01/22 1908     D-Dimer, Quantitative 0.30 MCGFEU/mL     Narrative:      Reference Range is 0-0.50 MCGFEU/mL. However, results <0.50 MCGFEU/mL tends to rule out DVT or PE. Results >0.50 MCGFEU/mL are not useful in predicting absence or presence of DVT or PE.      Bass Top [[id number]] Collected: 06/01/22 1808    Specimen: Blood Updated: 06/01/22 1824        Imaging Results (Last 24 Hours)     ** No  results found for the last 24 hours. **        I have personally reviewed and interpreted the radiology studies and ECG obtained at time of admission.     Assessment / Plan     Assessment:   Active Hospital Problems    Diagnosis    • Syncope and collapse      Impression:  1.  Elevated troponin after recent PCI  2.  Syncope/Dizziness   3.  Non-insulin-dependent diabetes mellitus type 2  4.  Hypotension history of hypertension    Plan:   1.  Admit to the hospital  2.  Trend cardiac markers  3.  EKG in the morning  4.  Neurochecks  5.  Hold Glucophage and start sliding scale insulin   6.  Orthostatics  7.  Adjust home medications to include decreasing the dose of lisinopril and Imdur  8.  Cardiology consult      Code Status/Advanced Care Plan: Full    The patient's surrogate decision maker is family.     I discussed my findings and recommendations with the patient.    Estimated length of stay is overnight.     The patient was seen and examined by me on 6/1/2022 at 9.    Electronically signed by Kayli Sevilla DO, 06/01/22, 21:29 CDT.

## 2022-06-02 NOTE — TELEPHONE ENCOUNTER
Rx Refill Note  Requested Prescriptions     Pending Prescriptions Disp Refills   • gabapentin (NEURONTIN) 100 MG capsule [Pharmacy Med Name: GABAPENTIN 100 MG CAPSULE] 90 capsule 0     Sig: TAKE 1 CAPSULE BY MOUTH THREE TIMES A DAY      Last office visit with prescribing clinician: 5/31/2022      Next office visit with prescribing clinician: 6/8/2022             Edna Bull MA  06/02/22, 14:54 CDT

## 2022-06-02 NOTE — OUTREACH NOTE
Medical Week 2 Survey    Flowsheet Row Responses   [facility] patient discharged from? [place]   Does the patient have one of the following disease processes/diagnoses(primary or secondary)? Other   Week 2 attempt successful? No   Revoke Readmitted          JENNYFER Bettencourt Registered Nurse

## 2022-06-02 NOTE — DISCHARGE SUMMARY
[facility] Medicine Services  DISCHARGE SUMMARY       Date of Admission: 6/1/2022  Date of Discharge:  6/2/2022  Primary Care Physician: Jian Rankin MD    Presenting Problem/Chief Complaint:  Syncope    Final Discharge Diagnoses:  Active Hospital Problems    Diagnosis    • Syncope and collapse        Consults:   1.  Cardiology - MICKEY Graf, Dr. Singleton    Procedures Performed: None    Pertinent Test Results:   Lab Results (all)     Procedure Component Value Units Date/Time    POC Glucose Once [[id number]]  (Normal) Collected: 06/02/22 1053    Specimen: Blood Updated: 06/02/22 1113     Glucose 104 mg/dL      Comment: : [id number] King LadonnaMeter ID: [id number]       Troponin [[id number]]  (Abnormal) Collected: 06/02/22 0756    Specimen: Blood Updated: 06/02/22 0853     Troponin T 0.032 ng/mL             Comprehensive Metabolic Panel [[id number]]  (Abnormal) Collected: 06/02/22 0756    Specimen: Blood Updated: 06/02/22 0846     Glucose 148 mg/dL      BUN 16 mg/dL      Creatinine 1.03 mg/dL      Sodium 135 mmol/L      Potassium 4.4 mmol/L      Chloride 102 mmol/L      CO2 26.0 mmol/L      Calcium 9.2 mg/dL      Total Protein 6.1 g/dL      Albumin 3.70 g/dL      ALT (SGPT) 22 U/L      AST (SGOT) 17 U/L      Alkaline Phosphatase 104 U/L      Total Bilirubin 0.4 mg/dL      Globulin 2.4 gm/dL      A/G Ratio 1.5 g/dL      BUN/Creatinine Ratio 15.5     Anion Gap 7.0 mmol/L      eGFR 81.6 mL/min/1.73      Comment: National Kidney Foundation and American Society of Nephrology (ASN) Task Force recommended calculation based on the Chronic Kidney Disease Epidemiology Collaboration (CKD-EPI) equation refit without adjustment for race.       CBC Auto Differential [[id number]]  (Abnormal) Collected: 06/02/22 0756    Specimen: Blood Updated: 06/02/22 0822     WBC 8.53 10*3/mm3      RBC 3.86 10*6/mm3      Hemoglobin 12.5 g/dL      Hematocrit 37.7 %      MCV 97.7 fL      MCH  32.4 pg      MCHC 33.2 g/dL      RDW 12.1 %      RDW-SD 43.5 fl      MPV 12.2 fL      Platelets 225 10*3/mm3      Neutrophil % 67.2 %      Lymphocyte % 21.9 %      Monocyte % 6.7 %      Eosinophil % 2.7 %      Basophil % 0.9 %      Immature Grans % 0.6 %      Neutrophils, Absolute 5.73 10*3/mm3      Lymphocytes, Absolute 1.87 10*3/mm3      Monocytes, Absolute 0.57 10*3/mm3      Eosinophils, Absolute 0.23 10*3/mm3      Basophils, Absolute 0.08 10*3/mm3      Immature Grans, Absolute 0.05 10*3/mm3      nRBC 0.0 /100 WBC     POC Glucose Once [[id number]]  (Normal) Collected: 06/02/22 0738    Specimen: Blood Updated: 06/02/22 0750     Glucose 125 mg/dL      Comment: : [id number] Juma Mallory) KatieMeter ID: [id number]       Bass Top [[id number]] Collected: 06/01/22 1808    Specimen: Blood Updated: 06/01/22 2217     Extra Tube Hold for add-ons.     Comment: Auto resulted.       COVID PRE-OP / PRE-PROCEDURE SCREENING ORDER (NO ISOLATION) - Swab, Nasal Cavity [[id number]]  (Normal) Collected: 06/01/22 2055    Specimen: Swab from Nasal Cavity Updated: 06/01/22 2139    Narrative:      The following orders were created for panel order COVID PRE-OP / PRE-PROCEDURE SCREENING ORDER (NO ISOLATION) - Swab, Nasal Cavity.  Procedure                               Abnormality         Status                     ---------                               -----------         ------                     COVID-19,[facility] [employer] IN-GOLDY...[[id number]]  Normal              Final result                 Please view results for these tests on the individual orders.    COVID-19,[facility] [employer] IN-HOUSE,Nasal Swab No Transport Media 3-4 HR TAT - Swab, Nasal Cavity [[id number]]  (Normal) Collected: 06/01/22 2055    Specimen: Swab from Nasal Cavity Updated: 06/01/22 2139     COVID19 Not Detected            Troponin [[id number]]  (Abnormal) Collected: 06/01/22 2040    Specimen: Blood Updated: 06/01/22 2110     Troponin T 0.063 ng/mL             Troponin [[id number]]   (Abnormal) Collected: 06/01/22 1808    Specimen: Blood Updated: 06/01/22 1923     Troponin T 0.054 ng/mL             CBC & Differential [[id number]]  (Abnormal) Collected: 06/01/22 1808    Specimen: Blood Updated: 06/01/22 1921    Narrative:      The following orders were created for panel order CBC & Differential.  Procedure                               Abnormality         Status                     ---------                               -----------         ------                     CBC Auto Differential[[id number]]        Abnormal            Final result                 Please view results for these tests on the individual orders.    CBC Auto Differential [[id number]]  (Abnormal) Collected: 06/01/22 1808    Specimen: Blood Updated: 06/01/22 1921     WBC 9.17 10*3/mm3      RBC 4.09 10*6/mm3      Hemoglobin 13.8 g/dL      Hematocrit 39.4 %      MCV 96.3 fL      MCH 33.7 pg      MCHC 35.0 g/dL      RDW 12.1 %      RDW-SD 42.4 fl      MPV 12.2 fL      Platelets 265 10*3/mm3      Neutrophil % 67.6 %      Lymphocyte % 21.0 %      Monocyte % 7.2 %      Eosinophil % 2.6 %      Basophil % 1.1 %      Immature Grans % 0.5 %      Neutrophils, Absolute 6.19 10*3/mm3      Lymphocytes, Absolute 1.93 10*3/mm3      Monocytes, Absolute 0.66 10*3/mm3      Eosinophils, Absolute 0.24 10*3/mm3      Basophils, Absolute 0.10 10*3/mm3      Immature Grans, Absolute 0.05 10*3/mm3      nRBC 0.0 /100 WBC     Comprehensive Metabolic Panel [[id number]]  (Abnormal) Collected: 06/01/22 1808    Specimen: Blood Updated: 06/01/22 1919     Glucose 143 mg/dL      BUN 15 mg/dL      Creatinine 1.14 mg/dL      Sodium 136 mmol/L      Potassium 4.1 mmol/L      Chloride 100 mmol/L      CO2 22.0 mmol/L      Calcium 9.0 mg/dL      Total Protein 6.3 g/dL      Albumin 3.80 g/dL      ALT (SGPT) 25 U/L      AST (SGOT) 22 U/L      Alkaline Phosphatase 86 U/L      Total Bilirubin 0.6 mg/dL      Globulin 2.5 gm/dL      A/G Ratio 1.5 g/dL      BUN/Creatinine Ratio  13.2     Anion Gap 14.0 mmol/L      eGFR 72.3 mL/min/1.73      Comment: National Kidney Foundation and American Society of Nephrology (ASN) Task Force recommended calculation based on the Chronic Kidney Disease Epidemiology Collaboration (CKD-EPI) equation refit without adjustment for race.               BNP [[id number]]  (Normal) Collected: 06/01/22 1808    Specimen: Blood Updated: 06/01/22 1919     proBNP 61.0 pg/mL             [place] Blood Culture Bottle Set [[id number]] Collected: 06/01/22 1808    Specimen: Blood from Arm, Right Updated: 06/01/22 1917     Extra Tube Hold for add-ons.     Comment: Auto resulted.       Green Top (Gel) [[id number]] Collected: 06/01/22 1808    Specimen: Blood Updated: 06/01/22 1917     Extra Tube Hold for add-ons.     Comment: Auto resulted.       Lavender Top [[id number]] Collected: 06/01/22 1808    Specimen: Blood Updated: 06/01/22 1917     Extra Tube hold for add-on     Comment: Auto resulted       Red Top [[id number]] Collected: 06/01/22 1808    Specimen: Blood Updated: 06/01/22 1917     Extra Tube Hold for add-ons.     Comment: Auto resulted.       Light Blue Top [[id number]] Collected: 06/01/22 1808    Specimen: Blood Updated: 06/01/22 1917     Extra Tube Hold for add-ons.     Comment: Auto resulted       D-dimer, Quantitative [[id number]]  (Normal) Collected: 06/01/22 1808    Specimen: Blood Updated: 06/01/22 1908     D-Dimer, Quantitative 0.30 MCGFEU/mL     Narrative:      Reference Range is 0-0.50 MCGFEU/mL. However, results <0.50 MCGFEU/mL tends to rule out DVT or PE. Results >0.50 MCGFEU/mL are not useful in predicting absence or presence of DVT or PE.          Imaging Results (All)     None          Results for orders placed during the hospital encounter of 05/18/22    Adult Transthoracic Echo Complete W/ Cont if Necessary Per Protocol    Interpretation Summary  · Left ventricular ejection fraction appears to be 61 - 65%. Left ventricular systolic function is normal.  · Left  ventricular diastolic function was normal.  · Normal right ventricular cavity size and systolic function noted.  · There is thickening of the aortic valve. No aortic valve regurgitation is present. No hemodynamically significant aortic valve stenosis is present.  · The mitral valve is structurally normal with no regurgitation or significant stenosis present.  · No evidence of pulmonary hypertension is present.      Imaging Results (All)     None          LAB RESULTS:      Lab 06/02/22  0756 06/01/22  1808   WBC 8.53 9.17   HEMOGLOBIN 12.5* 13.8   HEMATOCRIT 37.7 39.4   PLATELETS 225 265   NEUTROS ABS 5.73 6.19   IMMATURE GRANS (ABS) 0.05 0.05   LYMPHS ABS 1.87 1.93   MONOS ABS 0.57 0.66   EOS ABS 0.23 0.24   MCV 97.7* 96.3   D DIMER QUANT  --  0.30         Lab 06/02/22  0756 06/01/22  1808   SODIUM 135* 136   POTASSIUM 4.4 4.1   CHLORIDE 102 100   CO2 26.0 22.0   ANION GAP 7.0 14.0   BUN 16 15   CREATININE 1.03 1.14   EGFR 81.6 72.3   GLUCOSE 148* 143*   CALCIUM 9.2 9.0         Lab 06/02/22  0756 06/01/22  1808   TOTAL PROTEIN 6.1 6.3   ALBUMIN 3.70 3.80   GLOBULIN 2.4 2.5   ALT (SGPT) 22 25   AST (SGOT) 17 22   BILIRUBIN 0.4 0.6   ALK PHOS 104 86         Lab 06/02/22  0756 06/01/22  2040 06/01/22  1808   PROBNP  --   --  61.0   TROPONIN T 0.032* 0.063* 0.054*                 Brief Urine Lab Results     None        Microbiology Results (last 10 days)     Procedure Component Value - Date/Time    COVID PRE-OP / PRE-PROCEDURE SCREENING ORDER (NO ISOLATION) - Swab, Nasal Cavity [[id number]]  (Normal) Collected: 06/01/22 2055    Lab Status: Final result Specimen: Swab from Nasal Cavity Updated: 06/01/22 2139    Narrative:      The following orders were created for panel order COVID PRE-OP / PRE-PROCEDURE SCREENING ORDER (NO ISOLATION) - Swab, Nasal Cavity.  Procedure                               Abnormality         Status                     ---------                               -----------         ------                      COVID-19,[facility] [employer] IN-GOLDY...[[id number]]  Normal              Final result                 Please view results for these tests on the individual orders.    COVID-19,[facility] IN-HOUSE,Nasal Swab No Transport Media 3-4 HR TAT - Swab, Nasal Cavity [[id number]]  (Normal) Collected: 06/01/22 2055    Lab Status: Final result Specimen: Swab from Nasal Cavity Updated: 06/01/22 2139     COVID19 Not Detected    Narrative:      Fact sheet for providers: https://www.fda.gov/media/371557/download     Fact sheet for patients: https://www.fda.gov/media/946372/download    Test performed by PCR.    Consider negative results in combination with clinical observations, patient history, and epidemiological information.  Fact sheet for providers: https://www.fda.gov/media/116910/download     Fact sheet for patients: https://www.[place].gov/media/186964/download    Test performed by PCR.    Consider negative results in combination with clinical observations, patient history, and epidemiological information.        History of Present Illness on Day of Discharge: Patient is sitting up in the bed.  He states that he has not experienced any more dizziness or lightheadedness.  He reports that he knew it was a medication problem and is somewhat frustrated that this was not fixed in the outpatient setting.  He feels that he is ready to go home today and understands that medication dosage adjustments that have been made.  He denies chest pain or shortness of breath.    Hospital Course:  The patient is a 62 y.o. male who follows with Dr. Rankin for primary care.  He has a past medical history significant for type 2 diabetes mellitus, GERD, hypertension.  He presented to the emergency department on 6/1/2022 with complaints of syncope.  He was eating at a restaurant that evening and when he stood up to walk he got dizzy.  He took a break but when attempting to walk again he became even more dizzy and he fell against the wall.  His wife stated that  "she saw his eyes rolled in the back of his head.  However, he does not think that he actually lost consciousness.  He was admitted on 5/23 due to abnormal nuclear stress test and stent placement to the distal right coronary artery.  He reports that he has had multiple dizzy spells since being discharged.  He does feel that these episodes were related to the medications that were started at discharge.  Blood pressure on arrival was 88/60.  Troponin was elevated at 0.054.  EKG did not show any acute changes.  The patient was admitted to the hospitalist service for further evaluation.    On admission his lisinopril dose was decreased from 40 mg to 10 mg and Imdur was decreased from 60 mg to 30 mg.  Cardiology consult was placed.  They have seen the patient and agree with the dosage adjustments.  They do feel that the patient was overmedicated with the new medications that had been started on previous admission.  It was recommended that isosorbide mononitrate be discontinued and reevaluate if lightheadedness, dizziness, or hypotension persists.  Patient has been instructed to monitor his blood pressure at home and follow-up closely with his PCP.    Troponin has trended down to 0.032.  Repeat EKG showed sinus rhythm with first-degree AV block with no significant changes.  The patient denies chest pain or shortness of breath.    Patient feels that he is ready to go home today.  He understands that this was likely medication related and if symptoms persist he will contact his PCP so further adjustments can be made in the outpatient setting.  Labs have been reviewed.  Home medications have been reconciled and resumed as appropriate. Patient is medically stable for discharge.    Condition on Discharge: Medically stable    Physical Exam on Discharge:  /58 (BP Location: Right arm, Patient Position: Lying)   Pulse 71   Temp 98.3 °F (36.8 °C) (Oral)   Resp 18   Ht 182.9 cm (72\")   Wt 102 kg (224 lb)   SpO2 97%   BMI " 30.38 kg/m²   Physical Exam  Vitals and nursing note reviewed.   Constitutional:       Appearance: Normal appearance.   HENT:      Head: Normocephalic.   Cardiovascular:      Rate and Rhythm: Normal rate and regular rhythm.      Pulses: Normal pulses.      Heart sounds: Normal heart sounds. No murmur heard.    No friction rub.      Comments: Sinus 71-91  Pulmonary:      Effort: Pulmonary effort is normal. No respiratory distress.      Breath sounds: Normal breath sounds. No wheezing or rhonchi.      Comments: Room air  Abdominal:      General: Bowel sounds are normal. There is no distension.      Palpations: Abdomen is soft.      Tenderness: There is no abdominal tenderness.   Musculoskeletal:         General: No swelling or tenderness. Normal range of motion.      Cervical back: Normal range of motion. No rigidity or tenderness.   Skin:     General: Skin is warm and dry.      Capillary Refill: Capillary refill takes less than 2 seconds.      Findings: No bruising, lesion or rash.   Neurological:      Mental Status: He is alert and oriented to person, place, and time. Mental status is at baseline.   Psychiatric:         Mood and Affect: Mood normal.         Behavior: Behavior normal.         Thought Content: Thought content normal.         Judgment: Judgment normal.         Discharge Disposition:  Home or Self Care    Discharge Medications:     Discharge Medications      Changes to Medications      Instructions Start Date   isosorbide mononitrate 30 MG 24 hr tablet  Commonly known as: IMDUR  What changed:   · medication strength  · how much to take   30 mg, Oral, Every 24 Hours Scheduled   Start Date: Steffany 3, 2022     lisinopril 10 MG tablet  Commonly known as: RIGO PORTER  What changed:   · medication strength  · how much to take  · when to take this   10 mg, Oral, Every 24 Hours Scheduled   Start Date: Steffany 3, 2022        Continue These Medications      Instructions Start Date   aspirin 81 MG EC tablet   81  mg, Oral, Daily      atorvastatin 40 MG tablet  Commonly known as: LIPITOR   40 mg, Oral, Daily      diclofenac 75 MG EC tablet  Commonly known as: VOLTAREN   75 mg, Oral, 2 Times Daily      gabapentin 100 MG capsule  Commonly known as: NEURONTIN   100 mg, Oral, 3 Times Daily      metFORMIN  MG 24 hr tablet  Commonly known as: GLUCOPHAGE-XR   1,000 mg, Oral, Daily With Breakfast      metoprolol succinate XL 25 MG 24 hr tablet  Commonly known as: TOPROL-XL   25 mg, Oral, Daily      nitroglycerin 0.4 MG SL tablet  Commonly known as: NITROSTAT   1 under the tongue as needed for angina, may repeat q5mins for up three doses      ranolazine 500 MG 12 hr tablet  Commonly known as: RANEXA   500 mg, Oral, Every 12 Hours Scheduled      Rybelsus 7 MG tablet  Generic drug: Semaglutide   7 mg, Oral, Daily      ticagrelor 90 MG tablet tablet  Commonly known as: BRILINTA   90 mg, Oral, 2 Times Daily             Discharge Diet:   Diet Instructions     Diet: Regular, Consistent Carbohydrate, Cardiac; Thin      Discharge Diet:  Regular  Consistent Carbohydrate  Cardiac       Fluid Consistency: Thin          Activity at Discharge:   Activity Instructions     Activity as Tolerated      Measure Blood Pressure      Check blood pressure daily. Record measurements for PCP to review.          Discharge Care Plan/Instructions:   1.  Follow-up with PCP in 1 week or sooner for new or worsening symptoms.  2.  Keep follow-up appointment with Dr. Flood on 6/22.  3.  Decrease lisinopril dosage to 10 mg daily.  4.  Decrease isosorbide mononitrate dosage to 30 mg daily.  5.  Monitor blood pressure at home and keep a daily log for PCP to review at follow-up.    Follow-up Appointments:   Future Appointments   Date Time Provider Department Center   6/8/2022 10:45 AM Jian Rankin MD [facility]   6/22/2022 10:15 AM Earl Flood DO [facility]       Test Results Pending at Discharge: None    Electronically signed  by MICKEY Barrera, 06/02/22, 11:34 CDT.    Time: 35 minutes

## 2022-06-03 ENCOUNTER — TRANSITIONAL CARE MANAGEMENT TELEPHONE ENCOUNTER (OUTPATIENT)
Dept: CALL CENTER | Facility: HOSPITAL | Age: 62
End: 2022-06-03

## 2022-06-03 NOTE — OUTREACH NOTE
Call Center TCM Note    Flowsheet Row Responses   [facility] patient discharged from? [place]   Does the patient have one of the following disease processes/diagnoses(primary or secondary)? Other   TCM attempt successful? Yes   Call start time 1310   Call end time 1326   Discharge diagnosis CARDIAC CATHETERIZATION  CAD   Person spoke with today (if not patient) and relationship patient   Meds reviewed with patient/caregiver? Yes   Is the patient having any side effects they believe may be caused by any medication additions or changes? No   Does the patient have all medications ordered at discharge? N/A   Is the patient taking all medications as directed (includes completed medication regime)? Yes   Does the patient have a primary care provider?  Yes   Does the patient have an appointment with their PCP within 7 days of discharge? Yes   Comments regarding PCP [facility] fu appt on 6/8/22 at 10:45 AM   Has the patient kept scheduled appointments due by today? N/A   Psychosocial issues? No   Did the patient receive a copy of their discharge instructions? Yes   Nursing interventions Reviewed instructions with patient   What is the patient's perception of their health status since discharge? Improving   Is the patient/caregiver able to teach back signs and symptoms related to disease process for when to call PCP? Yes   Is the patient/caregiver able to teach back signs and symptoms related to disease process for when to call 911? Yes   Is the patient/caregiver able to teach back the hierarchy of who to call/visit for symptoms/problems? PCP, Specialist, Home health nurse, Urgent Care, ED, 911 Yes   If the patient is a current smoker, are they able to teach back resources for cessation? Not a smoker   TCM call completed? Yes   Wrap up additional comments Pt states he is doing better. Pt verified [facility] fu appt on 6/8/22. No questions/concerns.          Vanna Byrnes RN    6/3/2022, 13:27 CDT

## 2022-06-08 ENCOUNTER — OFFICE VISIT (OUTPATIENT)
Dept: FAMILY MEDICINE CLINIC | Facility: CLINIC | Age: 62
End: 2022-06-08

## 2022-06-08 VITALS
HEIGHT: 72 IN | HEART RATE: 76 BPM | BODY MASS INDEX: 30.2 KG/M2 | OXYGEN SATURATION: 97 % | TEMPERATURE: 98.5 F | DIASTOLIC BLOOD PRESSURE: 67 MMHG | SYSTOLIC BLOOD PRESSURE: 103 MMHG | WEIGHT: 223 LBS

## 2022-06-08 DIAGNOSIS — E11.69 TYPE 2 DIABETES MELLITUS WITH OTHER SPECIFIED COMPLICATION, WITHOUT LONG-TERM CURRENT USE OF INSULIN: ICD-10-CM

## 2022-06-08 DIAGNOSIS — I95.9 HYPOTENSION, UNSPECIFIED HYPOTENSION TYPE: Primary | ICD-10-CM

## 2022-06-08 PROBLEM — I25.119 ATHEROSCLEROSIS OF NATIVE CORONARY ARTERY OF NATIVE HEART WITH ANGINA PECTORIS (HCC): Status: ACTIVE | Noted: 2022-06-07

## 2022-06-08 PROBLEM — E11.9 TYPE 2 DIABETES MELLITUS WITHOUT COMPLICATION, WITHOUT LONG-TERM CURRENT USE OF INSULIN: Status: ACTIVE | Noted: 2022-06-07

## 2022-06-08 PROBLEM — N18.2 CKD (CHRONIC KIDNEY DISEASE), STAGE II: Status: ACTIVE | Noted: 2022-06-07

## 2022-06-08 PROBLEM — E78.5 HLD (HYPERLIPIDEMIA): Status: ACTIVE | Noted: 2022-06-07

## 2022-06-08 PROBLEM — I10 BENIGN ESSENTIAL HYPERTENSION: Status: ACTIVE | Noted: 2022-06-07

## 2022-06-08 PROBLEM — K21.9 GERD (GASTROESOPHAGEAL REFLUX DISEASE): Status: ACTIVE | Noted: 2022-06-07

## 2022-06-08 PROCEDURE — 99213 OFFICE O/P EST LOW 20 MIN: CPT | Performed by: FAMILY MEDICINE

## 2022-06-08 RX ORDER — LISINOPRIL 2.5 MG/1
2.5 TABLET ORAL DAILY
Qty: 30 TABLET | Refills: 5 | Status: SHIPPED | OUTPATIENT
Start: 2022-06-08 | End: 2022-09-06

## 2022-06-08 NOTE — PROGRESS NOTES
Subjective   Ashley Bradley is a 62 y.o. male.     Chief Complaint   Patient presents with   • Follow-up     Hospital follow up. Patient passed out in a restaurant 6/1/22 and went to ER. States it was from [facility]ication [facility] Hospital changed dosage on some of his meds. He complains of feeling lightheaded when he stands up.       History of Present Illness     he recntly hwas hospitalized for near syncope--he is still noting lightheadedness at times---he thisk his bs are stable --he will see [facility] cardiology this friday      Current Outpatient Medications:   •  aspirin 81 MG EC tablet, Take 1 tablet by mouth Daily., Disp: 90 tablet, Rfl: 1  •  atorvastatin (LIPITOR) 40 MG tablet, Take 1 tablet by mouth Daily., Disp: 90 tablet, Rfl: 3  •  diclofenac (VOLTAREN) 75 MG EC tablet, Take 75 mg by mouth 2 (Two) Times a Day., Disp: , Rfl:   •  gabapentin (NEURONTIN) 100 MG capsule, TAKE 1 CAPSULE BY MOUTH THREE TIMES A DAY, Disp: 90 capsule, Rfl: 0  •  isosorbide mononitrate (IMDUR) 30 MG 24 hr tablet, Take 1 tablet by mouth Daily., Disp: 30 tablet, Rfl: 1  •  lisinopril (PRINIVIL,ZESTRIL) 10 MG tablet, Take 1 tablet by mouth Daily., Disp: 30 tablet, Rfl: 1  •  metFORMIN ER (GLUCOPHAGE-XR) 500 MG 24 hr tablet, Take 1,000 mg by mouth Daily With Breakfast., Disp: , Rfl:   •  metoprolol succinate XL (TOPROL-XL) 25 MG 24 hr tablet, Take 1 tablet by mouth Daily., Disp: 30 tablet, Rfl: 11  •  nitroglycerin (NITROSTAT) 0.4 MG SL tablet, 1 under the tongue as needed for angina, may repeat q5mins for up three doses, Disp: 30 tablet, Rfl: 3  •  ranolazine (RANEXA) 500 MG 12 hr tablet, Take 1 tablet by mouth Every 12 (Twelve) Hours., Disp: 60 tablet, Rfl: 5  •  Semaglutide (Rybelsus) 7 MG tablet, Take 7 mg by mouth Daily., Disp: 90 tablet, Rfl: 1  •  ticagrelor (BRILINTA) 90 MG tablet tablet, Take 1 tablet by mouth 2 (Two) Times a Day., Disp: 180 tablet, Rfl: 1  Allergies   Allergen Reactions   • Penicillamine Unknown - High  "Severity   • Penicillins        BMI is >= 30 and <35. (Class 1 Obesity). The following options were offered after discussion;: nutrition counseling/recommendations      Past Medical History:   Diagnosis Date   • Diabetes mellitus (HCC)    • Diverticula of colon    • GERD (gastroesophageal reflux disease)    • Hypertension      Past Surgical History:   Procedure Laterality Date   • CARDIAC CATHETERIZATION     • CARDIAC CATHETERIZATION Right 5/23/2022    Procedure: Left Heart  WITH POSS INTERVENTION, RIGHT RADIAL, US GUIDED;  Surgeon: Earl Flood DO;  Location:  [facility] CATH INVASIVE LOC[facility];  Service: Cardiovascular;  Laterality: Right;   • CARDIAC CATHETERIZATION N/A 5/24/2022    Procedure: Percutaneous Coronary Intervention;  Surgeon: Earl Flood DO;  Location:  [facility] CATH INVASIVE LOCATION;  Service: Cardiovascular;  Laterality: N/A;   • COLONOSCOPY  11/01/2010    snare 70, 60, rectum tics recall 5 years   • COLONOSCOPY Left 11/03/2016    Procedure: COLONOSCOPY WITH ANESTHESIA;  Surgeon: Lisandro Holt MD;  Location:  [facility] ENDOSCOPY;  Service:    • HEMORRHOIDECTOMY     • HERNIA REPAIR     • TONSILLECTOMY         Review of Systems   Constitutional: Negative.    HENT: Negative.    Eyes: Negative.    Respiratory: Negative.    Cardiovascular: Negative.    Gastrointestinal: Negative.    Endocrine: Negative.    Genitourinary: Negative.    Musculoskeletal: Negative.    Skin: Negative.    Allergic/Immunologic: Negative.    Neurological: Negative.    Hematological: Negative.    Psychiatric/Behavioral: Negative.        Objective  /67 (BP Location: Right arm, Patient Position: Sitting, Cuff Size: Adult)   Pulse 76   Temp 98.5 °F (36.9 °C)   Ht 182.9 cm (72.01\")   Wt 101 kg (223 lb)   SpO2 97%   BMI 30.24 kg/m²   Physical Exam  Vitals and nursing note reviewed.   Constitutional:       Appearance: Normal appearance. He is normal weight.   HENT:      Head: Normocephalic and atraumatic. "      Nose: Nose normal.      Mouth/Throat:      Mouth: Mucous membranes are moist.   Eyes:      Extraocular Movements: Extraocular movements intact.      Conjunctiva/sclera: Conjunctivae normal.      Pupils: Pupils are equal, round, and reactive to light.   Cardiovascular:      Rate and Rhythm: Normal rate and regular rhythm.      Pulses: Normal pulses.      Heart sounds: Normal heart sounds.   Pulmonary:      Effort: Pulmonary effort is normal.      Breath sounds: Normal breath sounds.   Abdominal:      General: Abdomen is flat. Bowel sounds are normal.      Palpations: Abdomen is soft.   Musculoskeletal:         General: Normal range of motion.      Cervical back: Normal range of motion and neck supple.   Skin:     General: Skin is warm and dry.      Capillary Refill: Capillary refill takes less than 2 seconds.   Neurological:      General: No focal deficit present.      Mental Status: He is alert and oriented to person, place, and time. Mental status is at baseline.   Psychiatric:         Mood and Affect: Mood normal.         Behavior: Behavior normal.         Thought Content: Thought content normal.         Judgment: Judgment normal.         Assessment & Plan   Diagnoses and all orders for this visit:    1. Hypotension, unspecified hypotension type (Primary)    2. Type 2 diabetes mellitus with other specified complication, without long-term current use of insulin (HCC)      Cut lisinopril from 10mg to 2.5mg daily  [facility] apt with [facility] this week           No orders of the defined types were placed in this encounter.      Follow up: 6 week(s)

## 2022-06-09 ENCOUNTER — TELEPHONE (OUTPATIENT)
Dept: CARDIOLOGY | Facility: CLINIC | Age: 62
End: 2022-06-09

## 2022-06-09 NOTE — TELEPHONE ENCOUNTER
CALLED PT TO GET PAYMENT FOR FMLA AND RELEASE FORM SIGNED.  PT WAS UPSET THAT HE HAD TO PAY AND COME  PAPERWORK.      PT ALSO STATES HE IS NOT FEELING GOOD AND FEELS LIKE IT IS HIS LISINOPRIL THAT IS MAKING HIM NOT FELL WELL    I OFFERED TO MOVE HIS APT UP FROM 06/22/22, PT DID NOT WANT TO COME IN EARLY.

## 2022-07-01 RX ORDER — ORAL SEMAGLUTIDE 7 MG/1
TABLET ORAL
Qty: 90 TABLET | Refills: 1 | Status: SHIPPED | OUTPATIENT
Start: 2022-07-01 | End: 2023-01-03

## 2022-07-01 RX ORDER — DICLOFENAC SODIUM 75 MG/1
TABLET, DELAYED RELEASE ORAL
Qty: 180 TABLET | Refills: 0 | Status: SHIPPED | OUTPATIENT
Start: 2022-07-01 | End: 2022-10-03

## 2022-07-01 RX ORDER — METFORMIN HYDROCHLORIDE 500 MG/1
TABLET, EXTENDED RELEASE ORAL
Qty: 180 TABLET | Refills: 1 | Status: SHIPPED | OUTPATIENT
Start: 2022-07-01 | End: 2023-01-03

## 2022-07-14 ENCOUNTER — OFFICE VISIT (OUTPATIENT)
Dept: FAMILY MEDICINE CLINIC | Facility: CLINIC | Age: 62
End: 2022-07-14

## 2022-07-14 VITALS
RESPIRATION RATE: 16 BRPM | HEIGHT: 72 IN | WEIGHT: 218 LBS | BODY MASS INDEX: 29.53 KG/M2 | HEART RATE: 74 BPM | TEMPERATURE: 98.9 F | SYSTOLIC BLOOD PRESSURE: 122 MMHG | DIASTOLIC BLOOD PRESSURE: 78 MMHG

## 2022-07-14 DIAGNOSIS — M54.50 ACUTE BILATERAL LOW BACK PAIN WITHOUT SCIATICA: Primary | ICD-10-CM

## 2022-07-14 PROCEDURE — 99213 OFFICE O/P EST LOW 20 MIN: CPT | Performed by: NURSE PRACTITIONER

## 2022-07-14 RX ORDER — HYDROCODONE BITARTRATE AND ACETAMINOPHEN 5; 325 MG/1; MG/1
1 TABLET ORAL EVERY 6 HOURS PRN
Qty: 20 TABLET | Refills: 0 | Status: SHIPPED | OUTPATIENT
Start: 2022-07-14 | End: 2022-07-22 | Stop reason: SDUPTHER

## 2022-07-14 RX ORDER — TIZANIDINE 4 MG/1
4 TABLET ORAL NIGHTLY PRN
Qty: 20 TABLET | Refills: 0 | Status: SHIPPED | OUTPATIENT
Start: 2022-07-14 | End: 2023-03-07

## 2022-07-14 NOTE — PROGRESS NOTES
Subjective   Chief Complaint:  Flareup of back pain    History of Present Illness:  This 62 y.o. male was seen in the office today.  He has a history of low back pain but is generally well-maintained taking diclofenac once a day.  He has it available twice a day but he has been keeping this conservative to not affect his kidneys, stomach or cardiac.  He is going on the 21st for a second stent at [place].  Reports over the last 1 week has been working around the house and having an acute flareup of pain, reports bending and walking makes it worse.  Denies any saddle numbness, no loss of leg or arm function.    Allergies   Allergen Reactions   • Penicillamine Unknown - High Severity   • Penicillins       Current Outpatient Medications on File Prior to Visit   Medication Sig   • aspirin 81 MG EC tablet Take 1 tablet by mouth Daily.   • atorvastatin (LIPITOR) 40 MG tablet Take 1 tablet by mouth Daily.   • diclofenac (VOLTAREN) 75 MG EC tablet TAKE 1 TABLET BY MOUTH TWICE A DAY   • gabapentin (NEURONTIN) 100 MG capsule TAKE 1 CAPSULE BY MOUTH THREE TIMES A DAY   • isosorbide mononitrate (IMDUR) 30 MG 24 hr tablet Take 1 tablet by mouth Daily.   • lisinopril (Zestril) 2.5 MG tablet Take 1 tablet by mouth Daily.   • metFORMIN ER (GLUCOPHAGE-XR) 500 MG 24 hr tablet TAKE 2 TABLETS BY MOUTH DAILY WITH BREAKFAST.   • metoprolol succinate XL (TOPROL-XL) 25 MG 24 hr tablet Take 1 tablet by mouth Daily.   • nitroglycerin (NITROSTAT) 0.4 MG SL tablet 1 under the tongue as needed for angina, may repeat q5mins for up three doses   • ranolazine (RANEXA) 500 MG 12 hr tablet Take 1 tablet by mouth Every 12 (Twelve) Hours.   • Rybelsus 7 MG tablet TAKE 1 TABLET BY MOUTH DAILY   • ticagrelor (BRILINTA) 90 MG tablet tablet Take 1 tablet by mouth 2 (Two) Times a Day.     No current facility-administered medications on file prior to visit.      Past Medical, Surgical, Social, and Family History:  Past Medical History:   Diagnosis Date    • Diabetes mellitus (HCC)    • Diverticula of colon    • GERD (gastroesophageal reflux disease)    • Hypertension      Past Surgical History:   Procedure Laterality Date   • CARDIAC CATHETERIZATION     • CARDIAC CATHETERIZATION Right 5/23/2022    Procedure: Left Heart  WITH POSS INTERVENTION, RIGHT RADIAL, US GUIDED;  Surgeon: Earl Flood DO;  Location:  [facility] CATH INVASIVE LOCATION;  Service: Cardiovascular;  Laterality: Right;   • CARDIAC CATHETERIZATION N/A 5/24/2022    Procedure: Percutaneous Coronary Intervention;  Surgeon: Earl Flood DO;  Location:  [facility] CATH INVASIVE LOC[facility];  Service: Cardiovascular;  Laterality: N/A;   • COLONOSCOPY  11/01/2010    snare 70, 60, rectum tics recall 5 years   • COLONOSCOPY Left 11/03/2016    Procedure: COLONOSCOPY WITH ANESTHESIA;  Surgeon: Lisandro Holt MD;  Location:  [facility] ENDOSCOPY;  Service:    • HEMORRHOIDECTOMY     • HERNIA REPAIR     • TONSILLECTOMY       Social History     Socioeconomic History   • Marital status: Single   Tobacco Use   • Smoking status: Former Smoker     Packs/day: 0.25     Years: 40.00     Pack years: 10.00     Types: Cigarettes   • Smokeless tobacco: Current User     Types: Snuff   Vaping Use   • Vaping Use: Never used   Substance and Sexual Activity   • Alcohol use: No   • Drug use: No   • Sexual activity: Defer     Family History   Problem Relation Age of Onset   • Ulcerative colitis Mother    • Valvular heart disease Mother    • Heart murmur Father    • Colon cancer Neg Hx      Objective   Physical Exam  Constitutional:       General: He is not in acute distress.  Cardiovascular:      Rate and Rhythm: Normal rate and regular rhythm.      Pulses: Normal pulses.      Heart sounds: No murmur heard.    No friction rub. No gallop.      Comments: Noted history of murmur-no current adventitious sound heard.  Pulmonary:      Effort: Pulmonary effort is normal. No respiratory distress.      Breath sounds: Normal  "breath sounds. No wheezing or rhonchi.   Abdominal:      Tenderness: There is no right CVA tenderness or left CVA tenderness.   Musculoskeletal:         General: Tenderness (*No spinous process tenderness-soft muscle tenderness bilateral) present.   Neurological:      Mental Status: He is alert.     /78   Pulse 74   Temp 98.9 °F (37.2 °C)   Resp 16   Ht 182.9 cm (72.01\")   Wt 98.9 kg (218 lb)   BMI 29.56 kg/m²     Assessment & Plan   Diagnoses and all orders for this visit:    1. Acute bilateral low back pain without sciatica (Primary)  -     tiZANidine (Zanaflex) 4 MG tablet; Take 1 tablet by mouth At Night As Needed for Muscle Spasms.  Dispense: 20 tablet; Refill: 0  -     HYDROcodone-acetaminophen (Norco) 5-325 MG per tablet; Take 1 tablet by mouth Every 6 (Six) Hours As Needed for Severe Pain .  Dispense: 20 tablet; Refill: 0    Discussion:  Advised and educated plan of care.  Advised different levels of aggressiveness-he is going to give medication a try, increase diclofenac to twice a day temporarily while he does rest and ice.  He has actually had to eat have physical therapy in the past.  He had an MRI about 3 years ago showed quite a few degenerative changes.  We discussed that if he does not get any better he will likely need imaging as a next step-advised him to call and we will start with x-rays.  Advised red flag symptoms that would require an ER visit.  I did advise that with his upcoming procedure, he likely will need to hold all NSAIDs at least few days before and to check.    Follow-up:  Return if symptoms worsen or fail to improve.    Electronically signed by MICKEY Soto, 07/14/22, 2:31 PM CDT.  "

## 2022-07-16 ENCOUNTER — APPOINTMENT (OUTPATIENT)
Dept: GENERAL RADIOLOGY | Age: 62
End: 2022-07-16
Payer: COMMERCIAL

## 2022-07-16 ENCOUNTER — HOSPITAL ENCOUNTER (EMERGENCY)
Age: 62
Discharge: HOME | End: 2022-07-16
Attending: EMERGENCY MEDICINE
Payer: COMMERCIAL

## 2022-07-16 VITALS
RESPIRATION RATE: 18 BRPM | TEMPERATURE: 98 F | OXYGEN SATURATION: 95 % | DIASTOLIC BLOOD PRESSURE: 85 MMHG | HEIGHT: 72 IN | HEART RATE: 67 BPM | BODY MASS INDEX: 29.53 KG/M2 | WEIGHT: 218 LBS | SYSTOLIC BLOOD PRESSURE: 116 MMHG

## 2022-07-16 DIAGNOSIS — I20.0 UNSTABLE ANGINA (HCC): Primary | ICD-10-CM

## 2022-07-16 DIAGNOSIS — Z91.89 AT RISK FOR NONCOMPLIANCE: ICD-10-CM

## 2022-07-16 LAB
ALBUMIN SERPL-MCNC: 4.6 G/DL (ref 3.5–5.2)
ALP BLD-CCNC: 123 U/L (ref 40–130)
ALT SERPL-CCNC: 33 U/L (ref 5–41)
ANION GAP SERPL CALCULATED.3IONS-SCNC: 13 MMOL/L (ref 7–19)
AST SERPL-CCNC: 23 U/L (ref 5–40)
BASOPHILS ABSOLUTE: 0.1 K/UL (ref 0–0.2)
BASOPHILS RELATIVE PERCENT: 1.1 % (ref 0–1)
BILIRUB SERPL-MCNC: 0.6 MG/DL (ref 0.2–1.2)
BUN BLDV-MCNC: 13 MG/DL (ref 8–23)
CALCIUM SERPL-MCNC: 9.3 MG/DL (ref 8.8–10.2)
CHLORIDE BLD-SCNC: 101 MMOL/L (ref 98–111)
CO2: 24 MMOL/L (ref 22–29)
CREAT SERPL-MCNC: 0.9 MG/DL (ref 0.5–1.2)
EOSINOPHILS ABSOLUTE: 0.2 K/UL (ref 0–0.6)
EOSINOPHILS RELATIVE PERCENT: 1.7 % (ref 0–5)
GFR AFRICAN AMERICAN: >59
GFR NON-AFRICAN AMERICAN: >60
GLUCOSE BLD-MCNC: 180 MG/DL (ref 74–109)
HCT VFR BLD CALC: 46.8 % (ref 42–52)
HEMOGLOBIN: 15.3 G/DL (ref 14–18)
IMMATURE GRANULOCYTES #: 0 K/UL
LYMPHOCYTES ABSOLUTE: 2.2 K/UL (ref 1.1–4.5)
LYMPHOCYTES RELATIVE PERCENT: 24.6 % (ref 20–40)
MCH RBC QN AUTO: 31.8 PG (ref 27–31)
MCHC RBC AUTO-ENTMCNC: 32.7 G/DL (ref 33–37)
MCV RBC AUTO: 97.3 FL (ref 80–94)
MONOCYTES ABSOLUTE: 0.7 K/UL (ref 0–0.9)
MONOCYTES RELATIVE PERCENT: 7.3 % (ref 0–10)
NEUTROPHILS ABSOLUTE: 5.8 K/UL (ref 1.5–7.5)
NEUTROPHILS RELATIVE PERCENT: 64.9 % (ref 50–65)
PDW BLD-RTO: 11.9 % (ref 11.5–14.5)
PLATELET # BLD: 221 K/UL (ref 130–400)
PMV BLD AUTO: 12 FL (ref 9.4–12.4)
POTASSIUM REFLEX MAGNESIUM: 5.1 MMOL/L (ref 3.5–5)
PRO-BNP: 36 PG/ML (ref 0–900)
RBC # BLD: 4.81 M/UL (ref 4.7–6.1)
SARS-COV-2, NAAT: NOT DETECTED
SODIUM BLD-SCNC: 138 MMOL/L (ref 136–145)
TOTAL PROTEIN: 7 G/DL (ref 6.6–8.7)
TROPONIN: <0.01 NG/ML (ref 0–0.03)
TROPONIN: <0.01 NG/ML (ref 0–0.03)
WBC # BLD: 8.9 K/UL (ref 4.8–10.8)

## 2022-07-16 PROCEDURE — 99285 EMERGENCY DEPT VISIT HI MDM: CPT

## 2022-07-16 PROCEDURE — 85025 COMPLETE CBC W/AUTO DIFF WBC: CPT

## 2022-07-16 PROCEDURE — 84484 ASSAY OF TROPONIN QUANT: CPT

## 2022-07-16 PROCEDURE — 71045 X-RAY EXAM CHEST 1 VIEW: CPT | Performed by: RADIOLOGY

## 2022-07-16 PROCEDURE — 6370000000 HC RX 637 (ALT 250 FOR IP): Performed by: EMERGENCY MEDICINE

## 2022-07-16 PROCEDURE — 80053 COMPREHEN METABOLIC PANEL: CPT

## 2022-07-16 PROCEDURE — 93005 ELECTROCARDIOGRAM TRACING: CPT | Performed by: EMERGENCY MEDICINE

## 2022-07-16 PROCEDURE — 83880 ASSAY OF NATRIURETIC PEPTIDE: CPT

## 2022-07-16 PROCEDURE — 71045 X-RAY EXAM CHEST 1 VIEW: CPT

## 2022-07-16 PROCEDURE — 36415 COLL VENOUS BLD VENIPUNCTURE: CPT

## 2022-07-16 PROCEDURE — 87635 SARS-COV-2 COVID-19 AMP PRB: CPT

## 2022-07-16 RX ORDER — NITROGLYCERIN 0.4 MG/1
TABLET SUBLINGUAL
COMMUNITY
Start: 2022-05-20

## 2022-07-16 RX ORDER — ATORVASTATIN CALCIUM 40 MG/1
40 TABLET, FILM COATED ORAL DAILY
COMMUNITY
Start: 2022-04-05

## 2022-07-16 RX ORDER — DICLOFENAC SODIUM 75 MG/1
TABLET, DELAYED RELEASE ORAL
COMMUNITY
Start: 2022-07-01

## 2022-07-16 RX ORDER — ASPIRIN 81 MG/1
81 TABLET ORAL DAILY
COMMUNITY
Start: 2022-05-25

## 2022-07-16 RX ORDER — RANOLAZINE 500 MG/1
500 TABLET, EXTENDED RELEASE ORAL EVERY 12 HOURS SCHEDULED
COMMUNITY
Start: 2022-05-25

## 2022-07-16 RX ORDER — TIZANIDINE 4 MG/1
4 TABLET ORAL NIGHTLY PRN
COMMUNITY
Start: 2022-07-14

## 2022-07-16 RX ORDER — HYDROCODONE BITARTRATE AND ACETAMINOPHEN 5; 325 MG/1; MG/1
1 TABLET ORAL EVERY 6 HOURS PRN
COMMUNITY
Start: 2022-07-14

## 2022-07-16 RX ORDER — METFORMIN HYDROCHLORIDE 500 MG/1
TABLET, EXTENDED RELEASE ORAL
COMMUNITY
Start: 2022-07-01

## 2022-07-16 RX ORDER — LISINOPRIL 2.5 MG/1
2.5 TABLET ORAL DAILY
COMMUNITY
Start: 2022-06-08

## 2022-07-16 RX ORDER — GABAPENTIN 100 MG/1
CAPSULE ORAL
COMMUNITY
Start: 2022-06-02

## 2022-07-16 RX ORDER — METOPROLOL SUCCINATE 25 MG/1
25 TABLET, EXTENDED RELEASE ORAL DAILY
COMMUNITY
Start: 2022-05-20

## 2022-07-16 RX ORDER — ORAL SEMAGLUTIDE 7 MG/1
TABLET ORAL
COMMUNITY
Start: 2022-07-01

## 2022-07-16 RX ADMIN — NITROGLYCERIN 0.5 INCH: 20 OINTMENT TOPICAL at 11:02

## 2022-07-16 ASSESSMENT — ENCOUNTER SYMPTOMS
NAUSEA: 0
EYE DISCHARGE: 0
CONSTIPATION: 0
DIARRHEA: 0
APNEA: 0
CHOKING: 0
COUGH: 0
BLOOD IN STOOL: 0
VOICE CHANGE: 0
SHORTNESS OF BREATH: 0
SORE THROAT: 0
SINUS PRESSURE: 0
FACIAL SWELLING: 0

## 2022-07-16 NOTE — ED PROVIDER NOTES
[facility] EMERGENCY DEPT  eMERGENCY dEPARTMENT eNCOUnter      Pt Name: Charu Bird  MRN: [id number]  [place] 1960  Date of evaluation: 7/16/2022  Provider: Tim Richard MD    [address] [place] [address]       Chief Complaint   Patient presents with    Chest Pain     Since last night, has taken multiple nitro with relief. Recent stents in [place], scheduled for another stent Thurs         HISTORY OF PRESENT ILLNESS   (Location/Symptom, Timing/Onset,Context/Setting, Quality, Duration, Modifying Factors, Severity)  Note limiting factors. Charu Bird is a 60 y.o. male who presents to the emergency department evaluation of chest pain. 58-year-old male presents with chest pain relieved with nitroglycerin. His pain started last night he has had about total 4 doses of nitroglycerin for episodes of pain. On around 21 June the patient underwent cardiac cath procedure. Here is a synopsis from [facility]. Synopsis:  Lucero Marquez is a 60 y.o. male with past medical history of HTN, HLD, DM2 who presented with shortness of breath over the last 6 to 7 months and abnormal stress test at OSH. This showed 80% stenosis in the left circumflex artery and a 80% stenosis in the distal RCA. A stent was placed in the circumflex artery. However, the RCA could not be stented even after multiple balloon inflations. The patient was started on multiple antianginals. Since then, he feels fatigued and tired and has no energy. He ended up in the hospital last week because of hypotension. He went to Dr. Magnus Romero for a second opinion who reviewed his fist cath. His imdur was stopped 2/2 severe fatigue. Referred for LHC/PCI of the RCA first with possible lithotripsy and then address the circumflex. Hospital Course:    Successful Shockwave lithotripsy and orbital atherectomy and PCI to the distal RCA with placement of 2.03r04mt Xience Skypoint KJ, post-dilated using a 3.0mm NC balloon at high pressure.      Manual pressure hemostasis to 6Fr RFA access site due to vascular calcification visualized on ultrasound. Plan for staged PCI to the LCx. The patient and his spouse are here with concerns and some denial because they keep asking me could this just be gas or something else but always relieved with nitro. He is currently pain-free. He denies any fever chills or infectious symptoms. He denies any injury. His chest symptoms occurred at rest.    The history is provided by the patient, the spouse and medical records. NursingNotes were reviewed. REVIEW OF SYSTEMS    (2-9 systems for level 4, 10 or more for level 5)     Review of Systems   Constitutional:  Negative for chills and fever. HENT:  Negative for congestion, drooling, facial swelling, nosebleeds, sinus pressure, sore throat and voice change. Eyes:  Negative for discharge. Respiratory:  Negative for apnea, cough, choking and shortness of breath. Cardiovascular:  Positive for chest pain. Negative for leg swelling. Gastrointestinal:  Negative for blood in stool, constipation, diarrhea and nausea. Genitourinary:  Negative for dysuria and enuresis. Musculoskeletal:  Negative for joint swelling. Skin:  Negative for rash and wound. Neurological:  Negative for seizures and syncope. Psychiatric/Behavioral:  Negative for behavioral problems, hallucinations and suicidal ideas. All other systems reviewed and are negative. A complete review of systems was performed and is negative except as noted above in the HPI. PAST MEDICAL HISTORY     Past Medical History:   Diagnosis Date    Diabetes mellitus ([facility] [address]     Hyperlipidemia     Hypertension          SURGICAL HISTORY       Past Surgical History:   Procedure Laterality Date    CORONARY ANGIOPLASTY WITH STENT PLACEMENT      HERNIA REPAIR      TONSILLECTOMY           CURRENT MEDICATIONS       Previous Medications    ASPIRIN 81 MG EC TABLET    Take 81 mg by mouth in the morning.     ATORVASTATIN (LIPITOR) 40 MG TABLET    Take 40 mg by mouth in the morning. DICLOFENAC (VOLTAREN) 75 MG EC TABLET    TAKE 1 TABLET BY MOUTH TWICE A DAY    GABAPENTIN (NEURONTIN) 100 MG CAPSULE    TAKE 1 CAPSULE BY MOUTH THREE TIMES A DAY    HYDROCODONE-ACETAMINOPHEN (NORCO) 5-325 MG PER TABLET    Take 1 tablet by mouth every 6 hours as needed. LISINOPRIL (PRINIVIL;ZESTRIL) 2.5 MG TABLET    Take 2.5 mg by mouth in the morning. METFORMIN (GLUCOPHAGE-XR) 500 MG EXTENDED RELEASE TABLET    TAKE 2 TABLETS BY MOUTH DAILY WITH BREAKFAST. METOPROLOL SUCCINATE (TOPROL XL) 25 MG EXTENDED RELEASE TABLET    Take 25 mg by mouth in the morning. NITROGLYCERIN (NITROSTAT) 0.4 MG SL TABLET    1 under the tongue as needed for angina, may repeat q5mins for up three doses    RANOLAZINE (RANEXA) 500 MG EXTENDED RELEASE TABLET    Take 500 mg by mouth in the morning and 500 mg before bedtime. SEMAGLUTIDE (RYBELSUS) 7 MG TABS    TAKE 1 TABLET BY MOUTH DAILY    TICAGRELOR (BRILINTA) 90 MG TABS TABLET    Take 90 mg by mouth in the morning and 90 mg before bedtime. TIZANIDINE (ZANAFLEX) 4 MG TABLET    Take 4 mg by mouth nightly as needed       ALLERGIES     Penicillins    FAMILY HISTORY     History reviewed. No pertinent family history.        SOCIAL HISTORY       Social History     Socioeconomic History    Marital status: Single     Spouse name: None    Number of children: None    Years of education: None    Highest education level: None   Tobacco Use    Smoking status: Former     Types: Cigarettes    Smokeless tobacco: Current   Substance and Sexual Activity    Alcohol use: Not Currently       SCREENINGS    [place] Coma Scale  Eye Opening: Spontaneous  Best Verbal Response: Oriented  Best Motor Response: Obeys commands  [place] Coma Scale Score: 15        PHYSICAL EXAM    (up to 7 for level 4, 8 or more for level 5)     ED Triage Vitals   BP Temp Temp src Pulse Resp SpO2 Height Weight   -- -- -- -- -- -- -- --       Physical Exam  Vitals and nursing note reviewed. Constitutional:       General: He is not in acute distress. Appearance: He is well-developed. HENT:      Head: Normocephalic and atraumatic. Right Ear: External ear normal.      Left Ear: External ear normal.      Mouth/Throat:      Pharynx: Oropharynx is clear. Eyes:      General: No scleral icterus. Conjunctiva/sclera: Conjunctivae normal.      Pupils: Pupils are equal, round, and reactive to light. Cardiovascular:      Rate and Rhythm: Normal rate and regular rhythm. Pulses: Normal pulses. Heart sounds: Normal heart sounds. Pulmonary:      Effort: Pulmonary effort is normal.      Breath sounds: Normal breath sounds. Abdominal:      General: Bowel sounds are normal.      Palpations: Abdomen is soft. Tenderness: There is no abdominal tenderness. Musculoskeletal:         General: Normal range of motion. Cervical back: Normal range of motion and neck supple. Right lower leg: No edema. Left lower leg: No edema. Skin:     General: Skin is warm and dry. Coloration: Skin is not jaundiced or pale. Neurological:      General: No focal deficit present. Mental Status: He is alert and oriented to person, place, and time. Psychiatric:         Mood and Affect: Mood normal.         Behavior: Behavior normal.       DIAGNOSTIC RESULTS     EKG: All EKG's are interpreted by the Emergency Department Physician who either signs or Co-signs this chart in the absence of a cardiologist.    EKG #1 done at 10:15 AM.  Showed sinus rhythm rate 79. MI interval 201. QTc 429. Computer read it as normal.  Looks like just a fraction of J-point elevation in the anterior leads V2, V3, V4 to my interpretation. EKG #2 done at 12:13 PM.  Shows sinus rhythm rate 70. MI interval 210. QTc 417. Also read is otherwise normal with and I appreciate just a fraction of J-point elevation anterior leads. The 2 EKGs are unchanged.     RADIOLOGY:   Non-plain film images such as CT, Ultrasound and MRI are read by the radiologist. Plainradiographic images are visualized and preliminarily interpreted by the emergency physician with the below findings:    I have reviewed the results. Interpretation per the Radiologist below, if available at the time of this note:    XR CHEST PORTABLE   Final Result   No gross infiltrate   Recommendation: Follow up as clinically indicated. Electronically Signed by Heather Meadows MD at 16-Jul-2022 12:08:06 PM                     ED BEDSIDE ULTRASOUND:   Performed by ED Physician - none    LABS:  Labs Reviewed   CBC WITH AUTO DIFFERENTIAL - Abnormal; Notable for the following components:       Result Value    MCV 97.3 (*)     MCH 31.8 (*)     MCHC 32.7 (*)     Basophils % 1.1 (*)     All other components within normal limits   COMPREHENSIVE METABOLIC PANEL W/ REFLEX TO MG FOR LOW K - Abnormal; Notable for the following components:    Potassium reflex Magnesium 5.1 (*)     Glucose 180 (*)     All other components within normal limits   COVID-19, RAPID   TROPONIN   BRAIN NATRIURETIC PEPTIDE   TROPONIN       All other labs were within normal range or not returned as of this dictation. EMERGENCY DEPARTMENT COURSE and DIFFERENTIALDIAGNOSIS/MDM:   Vitals:    Vitals:    07/16/22 1019 07/16/22 1102 07/16/22 1510   BP: (!) 143/94 (!) 134/90 116/85   Pulse: 76 76 67   Resp: 15  18   Temp: 98 °F (36.7 °C)     TempSrc: Oral     SpO2: 94%  95%   Weight: 218 lb (98.9 kg)     Height: 6' (1.829 m)         MDM  Number of Diagnoses or Management Options  Unstable angina (HCC)  Diagnosis management comments: No acute changes on EKG x2 and troponins negative x2. I applied some nitroglycerin ointment to the chest he is not having any hypotension or problems tolerating that at this time. I talked that he and his wife typically he would be admitted for chest pain work-up rule out. He does not want to stay in [address] [place] [address].   He will not be admitted [facility], MD (electronically signed)  AttendingEmergency Physician          Ryanne Pickard MD  07/16/22 0620

## 2022-07-16 NOTE — ED NOTES
[facility] [employer]) called.     Accepting Doctor:     Dr. Flynn Ganser     Call Report: [phone]    Fax Facesheet: [phone]    [facility]  07/16/22 6580

## 2022-07-16 NOTE — ED NOTES
Called [facility] [employer] ) to inform her that patient left AMA and would not be at [place] today.        Harrison Baker  07/16/22 4938

## 2022-07-16 NOTE — ED NOTES
Pt is becoming irate, angry about the time he has been here. Pt has been advised that he needs to go to [facility], but he is refusing. Pt was given an AMA form, but refusing to sign it, demanding that we remove his IV and let him go.  IV removed per patients request.      Isha Day RN  07/16/22 9156

## 2022-07-19 LAB
EKG P AXIS: 63 DEGREES
EKG P AXIS: 8 DEGREES
EKG P-R INTERVAL: 200 MS
EKG P-R INTERVAL: 210 MS
EKG Q-T INTERVAL: 376 MS
EKG Q-T INTERVAL: 390 MS
EKG QRS DURATION: 68 MS
EKG QRS DURATION: 68 MS
EKG QTC CALCULATION (BAZETT): 407 MS
EKG QTC CALCULATION (BAZETT): 407 MS
EKG T AXIS: 61 DEGREES
EKG T AXIS: 67 DEGREES

## 2022-07-19 PROCEDURE — 93010 ELECTROCARDIOGRAM REPORT: CPT | Performed by: INTERNAL MEDICINE

## 2022-07-22 ENCOUNTER — OFFICE VISIT (OUTPATIENT)
Dept: FAMILY MEDICINE CLINIC | Facility: CLINIC | Age: 62
End: 2022-07-22

## 2022-07-22 ENCOUNTER — HOSPITAL ENCOUNTER (OUTPATIENT)
Dept: GENERAL RADIOLOGY | Facility: HOSPITAL | Age: 62
Discharge: HOME | End: 2022-07-22
Admitting: NURSE PRACTITIONER

## 2022-07-22 ENCOUNTER — TELEPHONE (OUTPATIENT)
Dept: FAMILY MEDICINE CLINIC | Facility: CLINIC | Age: 62
End: 2022-07-22

## 2022-07-22 VITALS
RESPIRATION RATE: 14 BRPM | HEART RATE: 81 BPM | SYSTOLIC BLOOD PRESSURE: 156 MMHG | BODY MASS INDEX: 30.34 KG/M2 | DIASTOLIC BLOOD PRESSURE: 100 MMHG | OXYGEN SATURATION: 97 % | WEIGHT: 224 LBS | HEIGHT: 72 IN

## 2022-07-22 DIAGNOSIS — K21.9 GASTROESOPHAGEAL REFLUX DISEASE WITHOUT ESOPHAGITIS: ICD-10-CM

## 2022-07-22 DIAGNOSIS — I10 ESSENTIAL HYPERTENSION: Primary | ICD-10-CM

## 2022-07-22 DIAGNOSIS — M54.50 ACUTE BILATERAL LOW BACK PAIN WITHOUT SCIATICA: ICD-10-CM

## 2022-07-22 DIAGNOSIS — E11.69 TYPE 2 DIABETES MELLITUS WITH OTHER SPECIFIED COMPLICATION, WITHOUT LONG-TERM CURRENT USE OF INSULIN: ICD-10-CM

## 2022-07-22 PROCEDURE — 99214 OFFICE O/P EST MOD 30 MIN: CPT | Performed by: NURSE PRACTITIONER

## 2022-07-22 PROCEDURE — 72110 X-RAY EXAM L-2 SPINE 4/>VWS: CPT

## 2022-07-22 RX ORDER — HYDROCODONE BITARTRATE AND ACETAMINOPHEN 5; 325 MG/1; MG/1
1 TABLET ORAL EVERY 6 HOURS PRN
Qty: 20 TABLET | Refills: 0 | Status: SHIPPED | OUTPATIENT
Start: 2022-07-22 | End: 2023-03-07

## 2022-07-22 RX ORDER — LANSOPRAZOLE 30 MG/1
30 CAPSULE, DELAYED RELEASE ORAL DAILY
Qty: 30 CAPSULE | Refills: 5 | Status: SHIPPED | OUTPATIENT
Start: 2022-07-22 | End: 2022-07-29

## 2022-07-22 NOTE — PROGRESS NOTES
Noted, he'll need to let us know when but also where to send the referral.  I can't place the order till I know his intention to where he wants to go.    Electronically signed by MICKEY Soto, 07/22/22, 3:43 PM CDT.

## 2022-07-22 NOTE — PROGRESS NOTES
Please call the patient - degen. Findings as noted - see if he is willing to go to PT - if yes, which facility and I will place an order for a referral    Electronically signed by MICKEY Soto, 07/22/22, 1:15 PM CDT.

## 2022-07-22 NOTE — PROGRESS NOTES
Subjective   Chief Complaint:  Reevaluation after heart cath-persistent back pain    History of Present Illness:  This 62 y.o. male was seen in the office today.  He reports he had a heart cath at [place] yesterday.  Reports aside from some right wrist tenderness and swelling doing well.  He presents today with an elevated blood pressure-this correlates with him reporting not taking his blood pressure medication yet today.  He also reports a recent GERD flareup-reports done well with Prevacid in the past.  I gave some short dosing of pain meds for low back-he reports pain is persistent and no better after muscle relaxers and anti-inflammatories.    Allergies   Allergen Reactions   • Penicillamine Unknown - High Severity   • Penicillins       Current Outpatient Medications on File Prior to Visit   Medication Sig   • aspirin 81 MG EC tablet Take 1 tablet by mouth Daily.   • atorvastatin (LIPITOR) 40 MG tablet Take 1 tablet by mouth Daily.   • diclofenac (VOLTAREN) 75 MG EC tablet TAKE 1 TABLET BY MOUTH TWICE A DAY   • gabapentin (NEURONTIN) 100 MG capsule TAKE 1 CAPSULE BY MOUTH THREE TIMES A DAY   • isosorbide mononitrate (IMDUR) 30 MG 24 hr tablet Take 1 tablet by mouth Daily.   • lisinopril (Zestril) 2.5 MG tablet Take 1 tablet by mouth Daily.   • metFORMIN ER (GLUCOPHAGE-XR) 500 MG 24 hr tablet TAKE 2 TABLETS BY MOUTH DAILY WITH BREAKFAST.   • metoprolol succinate XL (TOPROL-XL) 25 MG 24 hr tablet Take 1 tablet by mouth Daily.   • nitroglycerin (NITROSTAT) 0.4 MG SL tablet 1 under the tongue as needed for angina, may repeat q5mins for up three doses   • ranolazine (RANEXA) 500 MG 12 hr tablet Take 1 tablet by mouth Every 12 (Twelve) Hours.   • Rybelsus 7 MG tablet TAKE 1 TABLET BY MOUTH DAILY   • ticagrelor (BRILINTA) 90 MG tablet tablet Take 1 tablet by mouth 2 (Two) Times a Day.   • tiZANidine (Zanaflex) 4 MG tablet Take 1 tablet by mouth At Night As Needed for Muscle Spasms.   • [DISCONTINUED]  HYDROcodone-acetaminophen (Norco) 5-325 MG per tablet Take 1 tablet by mouth Every 6 (Six) Hours As Needed for Severe Pain .     No current facility-administered medications on file prior to visit.      Past Medical, Surgical, Social, and Family History:  Past Medical History:   Diagnosis Date   • Diabetes mellitus (HCC)    • Diverticula of colon    • GERD (gastroesophageal reflux disease)    • Hypertension      Past Surgical History:   Procedure Laterality Date   • CARDIAC CATHETERIZATION     • CARDIAC CATHETERIZATION Right 5/23/2022    Procedure: Left Heart  WITH POSS INTERVENTION, RIGHT RADIAL, US GUIDED;  Surgeon: Earl Flood DO;  Location:  [facility];  Service: Cardiovascular;  Laterality: Right;   • CARDIAC CATHETERIZATION N/A 5/24/2022    Procedure: Percutaneous Coronary Intervention;  Surgeon: Earl Flood DO;  Location:  [facility] CATH [facility]VAS[facility];  Service: Cardiovascular;  Laterality: N/A;   • COLONOSCOPY  11/01/2010    snare 70, 60, rectum tics recall 5 years   • COLONOSCOPY Left 11/03/2016    Procedure: COLONOSCOPY WITH ANESTHESIA;  Surgeon: Lisandro Holt MD;  Location:  [facility];  Service:    • HEMORRHOIDECTOMY     • HERNIA REPAIR     • TONSILLECTOMY       Social History     Socioeconomic History   • Marital status: Single   Tobacco Use   • Smoking status: Former Smoker     Packs/day: 0.25     Years: 40.00     Pack years: 10.00     Types: Cigarettes   • Smokeless tobacco: Current User     Types: Snuff   Vaping Use   • Vaping Use: Never used   Substance and Sexual Activity   • Alcohol use: No   • Drug use: No   • Sexual activity: Defer     Family History   Problem Relation Age of Onset   • Ulcerative colitis Mother    • Valvular heart disease Mother    • Heart murmur Father    • Colon cancer Neg Hx      Objective   Physical Exam  Constitutional:       General: He is not in acute distress.     Appearance: He is obese.   Cardiovascular:      Rate  "and Rhythm: Normal rate and regular rhythm.      Pulses: Normal pulses.      Heart sounds: No murmur heard.    No friction rub. No gallop.      Comments: Bilateral radial pulses palpable-procedure site-right radial-pulses present.  Pulmonary:      Effort: Pulmonary effort is normal. No respiratory distress.      Breath sounds: Normal breath sounds. No wheezing or rhonchi.   Musculoskeletal:         General: Tenderness (*Low back) present.   Neurological:      Mental Status: He is alert.     /100   Pulse 81   Resp 14   Ht 182.9 cm (72\")   Wt 102 kg (224 lb)   SpO2 97%   BMI 30.38 kg/m²     Assessment & Plan   Diagnoses and all orders for this visit:    1. Essential hypertension (Primary)  -     CBC & Differential; Future  -     Comprehensive Metabolic Panel; Future    2. Gastroesophageal reflux disease without esophagitis  -     lansoprazole (Prevacid) 30 MG capsule; Take 1 capsule by mouth Daily.  Dispense: 30 capsule; Refill: 5    3. Acute bilateral low back pain without sciatica  -     HYDROcodone-acetaminophen (Norco) 5-325 MG per tablet; Take 1 tablet by mouth Every 6 (Six) Hours As Needed for Severe Pain .  Dispense: 20 tablet; Refill: 0  -     XR Spine Lumbar 4+ View; Future    4. Type 2 diabetes mellitus with other specified complication, without long-term current use of insulin ([facility])  -     Hemoglobin A1c; Future    Discussion:  Advised and educated plan of care.  Keep follow-ups with cardiology.  Advised take blood pressure meds so that he will have improved control today.  We will add a PPI-I advised against Carafate due to absorption issues with his antiplatelet meds.  We discussed benefits versus risks and the risks definitely outweigh the benefits here.  We will proceed with a low back x-ray-considering physical therapy unless severe findings.  Since he has had contrast dye in the last 48 hours and some mild dehydration showing on the care everywhere labs-advise stay well-hydrated and " recheck labs next week.    Follow-up:  Return in about 6 days (around 7/28/2022) for Lab Appointment.    Electronically signed by MICKEY Soto, 07/22/22, 11:41 AM CDT.

## 2022-07-22 NOTE — TELEPHONE ENCOUNTER
Caller: Ashley Bradley    Relationship to patient: Self    Best call back number: [phone]      New or established patient?  [] New  [x] Established    Date of discharge: 7/16/22    Facility discharged from: [facility]    Diagnosis/Symptoms:  CHEST PAIN    Length of stay (If applicable): ER 7/16/22    Specialty Only: Did you see a [facility] provider?    [] Yes  [x] No  If so, who?

## 2022-07-29 ENCOUNTER — OFFICE VISIT (OUTPATIENT)
Dept: FAMILY MEDICINE CLINIC | Facility: CLINIC | Age: 62
End: 2022-07-29

## 2022-07-29 VITALS
HEART RATE: 79 BPM | OXYGEN SATURATION: 97 % | DIASTOLIC BLOOD PRESSURE: 88 MMHG | SYSTOLIC BLOOD PRESSURE: 132 MMHG | RESPIRATION RATE: 14 BRPM | WEIGHT: 224 LBS | HEIGHT: 72 IN | BODY MASS INDEX: 30.34 KG/M2

## 2022-07-29 DIAGNOSIS — K21.9 GASTROESOPHAGEAL REFLUX DISEASE WITHOUT ESOPHAGITIS: Primary | ICD-10-CM

## 2022-07-29 PROCEDURE — 99213 OFFICE O/P EST LOW 20 MIN: CPT | Performed by: NURSE PRACTITIONER

## 2022-07-29 RX ORDER — OMEPRAZOLE 40 MG/1
CAPSULE, DELAYED RELEASE ORAL
Qty: 42 CAPSULE | Refills: 0 | Status: SHIPPED | OUTPATIENT
Start: 2022-07-29 | End: 2022-08-26

## 2022-07-29 RX ORDER — ATORVASTATIN CALCIUM 40 MG/1
40 TABLET, FILM COATED ORAL DAILY
Qty: 90 TABLET | Refills: 3 | Status: SHIPPED | OUTPATIENT
Start: 2022-07-29

## 2022-07-29 NOTE — PATIENT INSTRUCTIONS
Stop Prevacid  Start Prilosec twice a day for 2 weeks, then once a day for 2 weeks and then may go back on Prevacid as a maintenance medication.

## 2022-07-29 NOTE — PROGRESS NOTES
Subjective   Chief Complaint:  Chest discomfort    History of Present Illness:  This 62 y.o. male was seen in the office today.  Reports mid chest discomfort and burning.  Reports these are GI issues-reports is already had a heart cath couple months ago-reports this gets worse with eating and feels burning up into his esophagus in the back of his throat.    Allergies   Allergen Reactions   • Penicillamine Unknown - High Severity   • Penicillins       Current Outpatient Medications on File Prior to Visit   Medication Sig   • aspirin 81 MG EC tablet Take 1 tablet by mouth Daily.   • diclofenac (VOLTAREN) 75 MG EC tablet TAKE 1 TABLET BY MOUTH TWICE A DAY   • gabapentin (NEURONTIN) 100 MG capsule TAKE 1 CAPSULE BY MOUTH THREE TIMES A DAY   • HYDROcodone-acetaminophen (Norco) 5-325 MG per tablet Take 1 tablet by mouth Every 6 (Six) Hours As Needed for Severe Pain .   • isosorbide mononitrate (IMDUR) 30 MG 24 hr tablet Take 1 tablet by mouth Daily.   • lisinopril (Zestril) 2.5 MG tablet Take 1 tablet by mouth Daily.   • metFORMIN ER (GLUCOPHAGE-XR) 500 MG 24 hr tablet TAKE 2 TABLETS BY MOUTH DAILY WITH BREAKFAST.   • metoprolol succinate XL (TOPROL-XL) 25 MG 24 hr tablet Take 1 tablet by mouth Daily.   • nitroglycerin (NITROSTAT) 0.4 MG SL tablet 1 under the tongue as needed for angina, may repeat q5mins for up three doses   • ranolazine (RANEXA) 500 MG 12 hr tablet Take 1 tablet by mouth Every 12 (Twelve) Hours.   • Rybelsus 7 MG tablet TAKE 1 TABLET BY MOUTH DAILY   • ticagrelor (BRILINTA) 90 MG tablet tablet Take 1 tablet by mouth 2 (Two) Times a Day.   • tiZANidine (Zanaflex) 4 MG tablet Take 1 tablet by mouth At Night As Needed for Muscle Spasms.   • [DISCONTINUED] atorvastatin (LIPITOR) 40 MG tablet Take 1 tablet by mouth Daily.   • [DISCONTINUED] lansoprazole (Prevacid) 30 MG capsule Take 1 capsule by mouth Daily.     No current facility-administered medications on file prior to visit.      Past Medical, Surgical,  Social, and Family History:  Past Medical History:   Diagnosis Date   • Diabetes mellitus (HCC)    • Diverticula of colon    • GERD (gastroesophageal reflux disease)    • Hypertension      Past Surgical History:   Procedure Laterality Date   • CARDIAC CATHETERIZATION     • CARDIAC CATHETERIZATION Right 5/23/2022    Procedure: Left Heart  WITH POSS INTERVENTION, RIGHT RADIAL, US GUIDED;  Surgeon: Earl Flood DO;  Location:  [facility];  Service: Cardiovascular;  Laterality: Right;   • CARDIAC CATHETERIZATION N/A 5/24/2022    Procedure: Percutaneous Coronary Intervention;  Surgeon: Earl Flood DO;  Location:  [facility];  Service: Cardiovascular;  Laterality: N/A;   • COLONOSCOPY  11/01/2010    snare 70, 60, rectum tics recall 5 years   • COLONOSCOPY Left 11/03/2016    Procedure: COLONOSCOPY WITH ANESTHESIA;  Surgeon: Lisandro Holt MD;  Location:  [facility];  Service:    • HEMORRHOIDECTOMY     • HERNIA REPAIR     • TONSILLECTOMY       Social History     Socioeconomic History   • Marital status: Single   Tobacco Use   • Smoking status: Former Smoker     Packs/day: 0.25     Years: 40.00     Pack years: 10.00     Types: Cigarettes   • Smokeless tobacco: Current User     Types: Snuff   Vaping Use   • Vaping Use: Never used   Substance and Sexual Activity   • Alcohol use: No   • Drug use: No   • Sexual activity: Defer     Family History   Problem Relation Age of Onset   • Ulcerative colitis Mother    • Valvular heart disease Mother    • Heart murmur Father    • Colon cancer Neg Hx      Objective   Physical Exam  Constitutional:       General: He is not in acute distress.     Appearance: He is obese.   Cardiovascular:      Rate and Rhythm: Normal rate and regular rhythm.      Pulses: Normal pulses.      Heart sounds: No murmur heard.    No friction rub. No gallop.   Pulmonary:      Effort: Pulmonary effort is normal. No respiratory distress.       "Breath sounds: Normal breath sounds. No wheezing or rhonchi.   Abdominal:      General: There is no distension.      Tenderness: There is no abdominal tenderness.   Neurological:      Mental Status: He is alert.     /88   Pulse 79   Resp 14   Ht 182.9 cm (72\")   Wt 102 kg (224 lb)   SpO2 97%   BMI 30.38 kg/m²     Assessment & Plan   Diagnoses and all orders for this visit:    1. Gastroesophageal reflux disease without esophagitis (Primary)  -     H. Pylori Breath Test - Breath, Lung  -     omeprazole (priLOSEC) 40 MG capsule; Take 1 capsule by mouth 2 (Two) Times a Day for 14 days, THEN 1 capsule Daily for 14 days.  Dispense: 42 capsule; Refill: 0    Discussion:  Advised and educated plan of care.  Advised stop Prevacid for now-do the omeprazole that I have ordered instead.  We will proceed with an H. pylori test.  Advise let me know    Follow-up:  Return for As Needed - Depending on Test Results - Will Call.    Electronically signed by MICKEY Soto, 07/29/22, 11:02 AM CDT.  "

## 2022-08-01 ENCOUNTER — TELEPHONE (OUTPATIENT)
Dept: FAMILY MEDICINE CLINIC | Facility: CLINIC | Age: 62
End: 2022-08-01

## 2022-08-01 LAB — UREA BREATH TEST QL: NEGATIVE

## 2022-08-01 NOTE — TELEPHONE ENCOUNTER
Caller: Ashley Bradley    Relationship: Self    Best call back number:[phone]    Caller requesting test results: SELF     What test was performed: BLEW IN TWO BAGS     When was the test performed: 07/28/22    Where was the test performed: OFFICE

## 2022-08-01 NOTE — TELEPHONE ENCOUNTER
No problem on the our end or [facility].  Test turn around time is 1-4 days.  I checked the site and did confirm the sample is received and running.    Electronically signed by MICKEY Soto, 08/01/22, 1:53 PM CDT.

## 2022-08-03 ENCOUNTER — TELEPHONE (OUTPATIENT)
Dept: FAMILY MEDICINE CLINIC | Facility: CLINIC | Age: 62
End: 2022-08-03

## 2022-08-03 ENCOUNTER — HOSPITAL ENCOUNTER (EMERGENCY)
Facility: HOSPITAL | Age: 62
Discharge: HOME | End: 2022-08-03
Attending: FAMILY MEDICINE | Admitting: FAMILY MEDICINE

## 2022-08-03 ENCOUNTER — APPOINTMENT (OUTPATIENT)
Dept: GENERAL RADIOLOGY | Facility: HOSPITAL | Age: 62
End: 2022-08-03

## 2022-08-03 VITALS
SYSTOLIC BLOOD PRESSURE: 144 MMHG | BODY MASS INDEX: 29.8 KG/M2 | WEIGHT: 220 LBS | RESPIRATION RATE: 20 BRPM | HEIGHT: 72 IN | HEART RATE: 70 BPM | TEMPERATURE: 98.9 F | OXYGEN SATURATION: 97 % | DIASTOLIC BLOOD PRESSURE: 98 MMHG

## 2022-08-03 DIAGNOSIS — R07.9 CHEST PAIN, UNSPECIFIED TYPE: Primary | ICD-10-CM

## 2022-08-03 LAB
ALBUMIN SERPL-MCNC: 4.5 G/DL (ref 3.5–5.2)
ALBUMIN/GLOB SERPL: 1.6 G/DL
ALP SERPL-CCNC: 111 U/L (ref 39–117)
ALT SERPL W P-5'-P-CCNC: 41 U/L (ref 1–41)
ANION GAP SERPL CALCULATED.3IONS-SCNC: 10 MMOL/L (ref 5–15)
AST SERPL-CCNC: 26 U/L (ref 1–40)
BASOPHILS # BLD AUTO: 0.07 10*3/MM3 (ref 0–0.2)
BASOPHILS NFR BLD AUTO: 1 % (ref 0–1.5)
BILIRUB SERPL-MCNC: 0.5 MG/DL (ref 0–1.2)
BUN SERPL-MCNC: 16 MG/DL (ref 8–23)
BUN/CREAT SERPL: 18.4 (ref 7–25)
CALCIUM SPEC-SCNC: 9.4 MG/DL (ref 8.6–10.5)
CHLORIDE SERPL-SCNC: 99 MMOL/L (ref 98–107)
CO2 SERPL-SCNC: 27 MMOL/L (ref 22–29)
CREAT SERPL-MCNC: 0.87 MG/DL (ref 0.76–1.27)
D DIMER PPP FEU-MCNC: 0.34 MCGFEU/ML (ref 0–0.5)
DEPRECATED RDW RBC AUTO: 43.7 FL (ref 37–54)
EGFRCR SERPLBLD CKD-EPI 2021: 97 ML/MIN/1.73
EOSINOPHIL # BLD AUTO: 0.14 10*3/MM3 (ref 0–0.4)
EOSINOPHIL NFR BLD AUTO: 2 % (ref 0.3–6.2)
ERYTHROCYTE [DISTWIDTH] IN BLOOD BY AUTOMATED COUNT: 12.2 % (ref 12.3–15.4)
GLOBULIN UR ELPH-MCNC: 2.8 GM/DL
GLUCOSE SERPL-MCNC: 185 MG/DL (ref 65–99)
HCT VFR BLD AUTO: 45.2 % (ref 37.5–51)
HGB BLD-MCNC: 15.2 G/DL (ref 13–17.7)
HOLD SPECIMEN: NORMAL
HOLD SPECIMEN: NORMAL
IMM GRANULOCYTES # BLD AUTO: 0.04 10*3/MM3 (ref 0–0.05)
IMM GRANULOCYTES NFR BLD AUTO: 0.6 % (ref 0–0.5)
INR PPP: 1.04 (ref 0.91–1.09)
LYMPHOCYTES # BLD AUTO: 1.57 10*3/MM3 (ref 0.7–3.1)
LYMPHOCYTES NFR BLD AUTO: 22.7 % (ref 19.6–45.3)
MAGNESIUM SERPL-MCNC: 1.6 MG/DL (ref 1.6–2.4)
MCH RBC QN AUTO: 32.8 PG (ref 26.6–33)
MCHC RBC AUTO-ENTMCNC: 33.6 G/DL (ref 31.5–35.7)
MCV RBC AUTO: 97.4 FL (ref 79–97)
MONOCYTES # BLD AUTO: 0.53 10*3/MM3 (ref 0.1–0.9)
MONOCYTES NFR BLD AUTO: 7.7 % (ref 5–12)
NEUTROPHILS NFR BLD AUTO: 4.56 10*3/MM3 (ref 1.7–7)
NEUTROPHILS NFR BLD AUTO: 66 % (ref 42.7–76)
NRBC BLD AUTO-RTO: 0 /100 WBC (ref 0–0.2)
PLATELET # BLD AUTO: 196 10*3/MM3 (ref 140–450)
PMV BLD AUTO: 12.1 FL (ref 6–12)
POTASSIUM SERPL-SCNC: 5.1 MMOL/L (ref 3.5–5.2)
PROT SERPL-MCNC: 7.3 G/DL (ref 6–8.5)
PROTHROMBIN TIME: 13.2 SECONDS (ref 11.9–14.6)
RBC # BLD AUTO: 4.64 10*6/MM3 (ref 4.14–5.8)
SODIUM SERPL-SCNC: 136 MMOL/L (ref 136–145)
TROPONIN T SERPL-MCNC: <0.01 NG/ML (ref 0–0.03)
TROPONIN T SERPL-MCNC: <0.01 NG/ML (ref 0–0.03)
WBC NRBC COR # BLD: 6.91 10*3/MM3 (ref 3.4–10.8)
WHOLE BLOOD HOLD COAG: NORMAL
WHOLE BLOOD HOLD SPECIMEN: NORMAL

## 2022-08-03 PROCEDURE — 85379 FIBRIN DEGRADATION QUANT: CPT | Performed by: FAMILY MEDICINE

## 2022-08-03 PROCEDURE — 96374 THER/PROPH/DIAG INJ IV PUSH: CPT

## 2022-08-03 PROCEDURE — 93010 ELECTROCARDIOGRAM REPORT: CPT | Performed by: INTERNAL MEDICINE

## 2022-08-03 PROCEDURE — 85610 PROTHROMBIN TIME: CPT | Performed by: FAMILY MEDICINE

## 2022-08-03 PROCEDURE — 80053 COMPREHEN METABOLIC PANEL: CPT | Performed by: FAMILY MEDICINE

## 2022-08-03 PROCEDURE — 85025 COMPLETE CBC W/AUTO DIFF WBC: CPT | Performed by: FAMILY MEDICINE

## 2022-08-03 PROCEDURE — 99284 EMERGENCY DEPT VISIT MOD MDM: CPT

## 2022-08-03 PROCEDURE — 71045 X-RAY EXAM CHEST 1 VIEW: CPT

## 2022-08-03 PROCEDURE — 36415 COLL VENOUS BLD VENIPUNCTURE: CPT

## 2022-08-03 PROCEDURE — 93005 ELECTROCARDIOGRAM TRACING: CPT | Performed by: FAMILY MEDICINE

## 2022-08-03 PROCEDURE — 84484 ASSAY OF TROPONIN QUANT: CPT | Performed by: FAMILY MEDICINE

## 2022-08-03 PROCEDURE — 83735 ASSAY OF MAGNESIUM: CPT | Performed by: FAMILY MEDICINE

## 2022-08-03 PROCEDURE — 93005 ELECTROCARDIOGRAM TRACING: CPT

## 2022-08-03 RX ORDER — NITROGLYCERIN 0.4 MG/1
TABLET SUBLINGUAL
Qty: 30 TABLET | Refills: 0 | Status: SHIPPED | OUTPATIENT
Start: 2022-08-03

## 2022-08-03 RX ORDER — ASPIRIN 81 MG/1
TABLET, CHEWABLE ORAL
Status: COMPLETED
Start: 2022-08-03 | End: 2022-08-03

## 2022-08-03 RX ORDER — SODIUM CHLORIDE 0.9 % (FLUSH) 0.9 %
10 SYRINGE (ML) INJECTION AS NEEDED
Status: DISCONTINUED | OUTPATIENT
Start: 2022-08-03 | End: 2022-08-03 | Stop reason: HOSPADM

## 2022-08-03 RX ORDER — NITROGLYCERIN 0.4 MG/1
0.4 TABLET SUBLINGUAL
Status: DISCONTINUED | OUTPATIENT
Start: 2022-08-03 | End: 2022-08-03 | Stop reason: HOSPADM

## 2022-08-03 RX ORDER — FAMOTIDINE 10 MG/ML
20 INJECTION, SOLUTION INTRAVENOUS ONCE
Status: COMPLETED | OUTPATIENT
Start: 2022-08-03 | End: 2022-08-03

## 2022-08-03 RX ADMIN — NITROGLYCERIN 0.4 MG: 0.4 TABLET SUBLINGUAL at 12:00

## 2022-08-03 RX ADMIN — ASPIRIN 243 MG: 81 TABLET, CHEWABLE ORAL at 10:36

## 2022-08-03 RX ADMIN — FAMOTIDINE 20 MG: 10 INJECTION INTRAVENOUS at 11:05

## 2022-08-03 RX ADMIN — NITROGLYCERIN 0.4 MG: 0.4 TABLET SUBLINGUAL at 10:37

## 2022-08-03 NOTE — ED PROVIDER NOTES
Subjective   This patient is a 62-year-old gentleman with a known history of coronary artery disease.  He presents today with sharp upper chest pain.  Apparently was on the porch this morning when he found a armadillo that he picked up and put in a box.  The effort of doing that caused him to have this chest pain.  He also had some diaphoresis but he is also a known sweater.  In the ED he had some residual chest discomfort which she scored 4 out of 10.  He had no diaphoresis in the ED.  He has no shortness of breath.  He is convinced that it is GI related as with the symptoms he gets a lot of belching and the belching seems to relieve him.  He had no fever or other systemic symptoms.          Review of Systems   Cardiovascular: Positive for chest pain.   All other systems reviewed and are negative.      Past Medical History:   Diagnosis Date   • Coronary artery disease    • Diabetes mellitus (HCC)    • Diverticula of colon    • GERD (gastroesophageal reflux disease)    • Hyperlipidemia    • Hypertension        Allergies   Allergen Reactions   • Penicillamine Unknown - High Severity   • Penicillins        Past Surgical History:   Procedure Laterality Date   • CARDIAC CATHETERIZATION     • CARDIAC CATHETERIZATION Right 5/23/2022    Procedure: Left Heart  WITH POSS INTERVENTION, RIGHT RADIAL, US GUIDED;  Surgeon: Earl Flood DO;  Location: [facility] CATH INVASIVE LOCATION;  Service: Cardiovascular;  Laterality: Right;   • CARDIAC CATHETERIZATION N/A 5/24/2022    Procedure: Percutaneous Coronary Intervention;  Surgeon: Earl Flood DO;  Location: [facility] CATH INVASIVE LOCATION;  Service: Cardiovascular;  Laterality: N/A;   • COLONOSCOPY  11/01/2010    snare 70, 60, rectum tics recall 5 years   • COLONOSCOPY Left 11/03/2016    Procedure: COLONOSCOPY WITH ANESTHESIA;  Surgeon: Lisandro Holt MD;  Location: [facility] ENDOSCOPY;  Service:    • HEMORRHOIDECTOMY     • HERNIA REPAIR     • TONSILLECTOMY          Family History   Problem Relation Age of Onset   • Ulcerative colitis Mother    • Valvular heart disease Mother    • Heart murmur Father    • Colon cancer Neg Hx        Social History     Socioeconomic History   • Marital status: Single   Tobacco Use   • Smoking status: Former Smoker     Packs/day: 0.25     Years: 40.00     Pack years: 10.00     Types: Cigarettes   • Smokeless tobacco: Current User     Types: Snuff   Vaping Use   • Vaping Use: Never used   Substance and Sexual Activity   • Alcohol use: No   • Drug use: No   • Sexual activity: Defer           Objective   Physical Exam  Vitals and nursing note reviewed.   Constitutional:       Appearance: He is well-developed.   HENT:      Head: Normocephalic and atraumatic.      Right Ear: External ear normal.      Left Ear: External ear normal.      Nose: Nose normal.   Eyes:      Extraocular Movements: Extraocular movements intact.      Conjunctiva/sclera: Conjunctivae normal.   Cardiovascular:      Rate and Rhythm: Normal rate and regular rhythm.      Pulses: Normal pulses.      Heart sounds: Normal heart sounds.   Pulmonary:      Effort: Pulmonary effort is normal.      Breath sounds: Normal breath sounds.   Abdominal:      General: Bowel sounds are normal.      Palpations: Abdomen is soft.   Musculoskeletal:         General: Normal range of motion.      Cervical back: Normal range of motion and neck supple.   Skin:     General: Skin is warm and dry.      Capillary Refill: Capillary refill takes less than 2 seconds.   Neurological:      General: No focal deficit present.      Mental Status: He is alert and oriented to person, place, and time.   Psychiatric:         Behavior: Behavior normal.         Thought Content: Thought content normal.         Judgment: Judgment normal.         Procedures       HEART Score for Major Cardiac Events - MDCalc  Calculated on Aug 03 2022 2:54 PM  6 points -> Moderate Score (4-6 points) Risk of MACE of 12-16.6%.  If EKG is  highly suspicious, many experts recommend further workup and admission even with a low HEART Score.    ED Course                                           MDM    Final diagnoses:   Chest pain, unspecified type       ED Disposition  ED Disposition     ED Disposition   Discharge    Condition   Stable    Comment   --             Jian Rankin MD  [address] [place] [id number]  [phone]               Where to Get Your Medications      These medications were sent to [facility]/pharmacy #[id number] - [place], [place] - [address] - [phone]  - [phone]   [address] [facility] [address]    Phone: [phone]   · nitroglycerin 0.4 MG SL tablet        Medication List      No changes were made to your prescriptions during this visit.       The patient's work-up included 2 normal troponins.  His initial EKG did not show what I thought was minimal ST elevation in the inferior leads.  Repeat EKG after nitroglycerin appeared to show these resolving.  I actually showed Dr. Singleton of these EKGs and discussed the case with him.  He was reassured that the troponins were negative and stated that if the patient was having ACS we should see a bump in the troponins or more dramatic changes on the EKG.  He advised that we should make sure the patient has nitroglycerin at home and follow-up with his cardiologist in [place].  The patient knows very clearly to return to the ED if his symptoms recur or if he has any other concern.    Elie Khoury MD  08/03/22 2026       Elie Khoury MD  08/03/22 [id number]       Elie Khoury MD  08/03/22 1400

## 2022-08-03 NOTE — TELEPHONE ENCOUNTER
Caller: Ashley Bradley    Relationship: Self    Best call back number: [phone]    What is the best time to reach you: ANYTIME     Who are you requesting to speak with (clinical staff, provider,  specific staff member): CLINICAL     What was the call regarding: PATIENT IS LEAVING THE HOSPITAL FROM BEING SEEN ABUT HIS CHEST PAINS EVERYTHING CAME BACK NEGATIVE AND THEY ARE NOT GOING TO COMPLETE THE SCOPE. PATIENT HAD A CHEST X-RAY DONE AND STILL WAITING RESULTS      Do you require a callback: YES

## 2022-08-04 NOTE — TELEPHONE ENCOUNTER
I am going to try to contact patient - it looks like the xray is back can you let me know how it is so I can let him know when contacting him?

## 2022-08-05 ENCOUNTER — OFFICE VISIT (OUTPATIENT)
Dept: FAMILY MEDICINE CLINIC | Facility: CLINIC | Age: 62
End: 2022-08-05

## 2022-08-05 VITALS
HEART RATE: 83 BPM | WEIGHT: 221.6 LBS | SYSTOLIC BLOOD PRESSURE: 161 MMHG | OXYGEN SATURATION: 99 % | DIASTOLIC BLOOD PRESSURE: 98 MMHG | TEMPERATURE: 97.1 F | BODY MASS INDEX: 30.02 KG/M2 | RESPIRATION RATE: 18 BRPM | HEIGHT: 72 IN

## 2022-08-05 DIAGNOSIS — R10.13 EPIGASTRIC PAIN: Primary | ICD-10-CM

## 2022-08-05 LAB
QT INTERVAL: 366 MS
QT INTERVAL: 366 MS
QT INTERVAL: 368 MS
QT INTERVAL: 370 MS
QTC INTERVAL: 424 MS
QTC INTERVAL: 432 MS
QTC INTERVAL: 432 MS
QTC INTERVAL: 435 MS

## 2022-08-05 PROCEDURE — 99213 OFFICE O/P EST LOW 20 MIN: CPT | Performed by: FAMILY MEDICINE

## 2022-08-05 NOTE — PROGRESS NOTES
"Subjective   Berlin Thomas Bradley is a 62 y.o. male.     Chief Complaint   Patient presents with   • Hospital Follow Up Visit     \"When it happens he gets pale and he just started pouring sweat\"       History of Present Illness     he says he recently went to [facility] for chest pain --he apparently left the hospital --he went to [place] on july 21tst--he had  heart cath and was told \"everything looks good\"..  He continues to have chest and upper abdominal pain--he is insistent his cardiologist in [place] told him e\"everything looks good'.--no chest pain now since yesterday--he is convinced his pain is not cardiac related and feels its of gi origin.    Current Outpatient Medications:   •  aspirin 81 MG EC tablet, Take 1 tablet by mouth Daily., Disp: 90 tablet, Rfl: 1  •  atorvastatin (LIPITOR) 40 MG tablet, Take 1 tablet by mouth Daily., Disp: 90 tablet, Rfl: 3  •  diclofenac (VOLTAREN) 75 MG EC tablet, TAKE 1 TABLET BY MOUTH TWICE A DAY, Disp: 180 tablet, Rfl: 0  •  gabapentin (NEURONTIN) 100 MG capsule, TAKE 1 CAPSULE BY MOUTH THREE TIMES A DAY, Disp: 90 capsule, Rfl: 0  •  HYDROcodone-acetaminophen (Norco) 5-325 MG per tablet, Take 1 tablet by mouth Every 6 (Six) Hours As Needed for Severe Pain ., Disp: 20 tablet, Rfl: 0  •  lisinopril (Zestril) 2.5 MG tablet, Take 1 tablet by mouth Daily., Disp: 30 tablet, Rfl: 5  •  metFORMIN ER (GLUCOPHAGE-XR) 500 MG 24 hr tablet, TAKE 2 TABLETS BY MOUTH DAILY WITH BREAKFAST., Disp: 180 tablet, Rfl: 1  •  metoprolol succinate XL (TOPROL-XL) 25 MG 24 hr tablet, Take 1 tablet by mouth Daily., Disp: 30 tablet, Rfl: 11  •  nitroglycerin (NITROSTAT) 0.4 MG SL tablet, 1 under the tongue as needed for angina, may repeat q5mins for up three doses, Disp: 30 tablet, Rfl: 0  •  omeprazole (priLOSEC) 40 MG capsule, Take 1 capsule by mouth 2 (Two) Times a Day for 14 days, THEN 1 capsule Daily for 14 days., Disp: 42 capsule, Rfl: 0  •  ranolazine (RANEXA) 500 MG 12 hr tablet, Take 1 " tablet by mouth Every 12 (Twelve) Hours., Disp: 60 tablet, Rfl: 5  •  Rybelsus 7 MG tablet, TAKE 1 TABLET BY MOUTH DAILY, Disp: 90 tablet, Rfl: 1  •  ticagrelor (BRILINTA) 90 MG tablet tablet, Take 1 tablet by mouth 2 (Two) Times a Day., Disp: 180 tablet, Rfl: 1  •  isosorbide mononitrate (IMDUR) 30 MG 24 hr tablet, Take 1 tablet by mouth Daily., Disp: 30 tablet, Rfl: 1  •  tiZANidine (Zanaflex) 4 MG tablet, Take 1 tablet by mouth At Night As Needed for Muscle Spasms., Disp: 20 tablet, Rfl: 0  Allergies   Allergen Reactions   • Penicillamine Unknown - High Severity   • Penicillins        BMI is >= 30 and <35. (Class 1 Obesity). The following options were offered after discussion;: nutrition counseling/recommendations      Past Medical History:   Diagnosis Date   • Coronary artery disease    • Diabetes mellitus (HCC)    • Diverticula of colon    • GERD (gastroesophageal reflux disease)    • Hyperlipidemia    • Hypertension      Past Surgical History:   Procedure Laterality Date   • CARDIAC CATHETERIZATION     • CARDIAC CATHETERIZATION Right 5/23/2022    Procedure: Left Heart  WITH POSS INTERVENTION, RIGHT RADIAL, US GUIDED;  Surgeon: Earl Flood DO;  Location:  [facility] CATH INVASIVE LOCATION;  Service: Cardiovascular;  Laterality: Right;   • CARDIAC CATHETERIZATION N/A 5/24/2022    Procedure: Percutaneous Coronary Intervention;  Surgeon: Earl Flood DO;  Location: [facility] CATH INVASIVE LOCATION;  Service: Cardiovascular;  Laterality: N/A;   • COLONOSCOPY  11/01/2010    snare 70, 60, rectum tics recall 5 years   • COLONOSCOPY Left 11/03/2016    Procedure: COLONOSCOPY WITH ANESTHESIA;  Surgeon: Lisandro Holt MD;  Location: [facility] ENDOSCOPY;  Service:    • HEMORRHOIDECTOMY     • HERNIA REPAIR     • TONSILLECTOMY         Review of Systems   Constitutional: Negative.    HENT: Negative.    Eyes: Negative.    Respiratory: Negative.    Cardiovascular: Positive for chest pain.   Gastrointestinal:  "Positive for abdominal pain.   Endocrine: Negative.    Genitourinary: Negative.    Musculoskeletal: Negative.    Skin: Negative.    Allergic/Immunologic: Negative.    Neurological: Negative.    Hematological: Negative.    Psychiatric/Behavioral: Negative.        Objective  /98 (BP Location: Left arm, Patient Position: Sitting, Cuff Size: Adult)   Pulse 83   Temp 97.1 °F (36.2 °C) (Infrared)   Resp 18   Ht 182.9 cm (72\")   Wt 101 kg (221 lb 9.6 oz)   SpO2 99%   BMI 30.05 kg/m²   Physical Exam  Vitals and nursing note reviewed.   Constitutional:       Appearance: Normal appearance. He is normal weight.   HENT:      Head: Normocephalic and atraumatic.      Nose: Nose normal.      Mouth/Throat:      Mouth: Mucous membranes are moist.   Eyes:      Extraocular Movements: Extraocular movements intact.      Conjunctiva/sclera: Conjunctivae normal.      Pupils: Pupils are equal, round, and reactive to light.   Cardiovascular:      Rate and Rhythm: Normal rate and regular rhythm.      Pulses: Normal pulses.      Heart sounds: Normal heart sounds.   Pulmonary:      Effort: Pulmonary effort is normal.      Breath sounds: Normal breath sounds.   Abdominal:      General: Abdomen is flat. Bowel sounds are normal.      Palpations: Abdomen is soft.   Musculoskeletal:         General: Normal range of motion.      Cervical back: Normal range of motion.   Skin:     General: Skin is warm and dry.      Capillary Refill: Capillary refill takes less than 2 seconds.   Neurological:      General: No focal deficit present.      Mental Status: He is alert and oriented to person, place, and time. Mental status is at baseline.   Psychiatric:         Mood and Affect: Mood normal.         Behavior: Behavior normal.         Thought Content: Thought content normal.         Judgment: Judgment normal.         Assessment & Plan   Diagnoses and all orders for this visit:    1. Epigastric pain (Primary)  -     Ambulatory Referral to " Gastroenterology  -     US Gallbladder      I advised if any further chest pain to notify 911 and present to the nearest e.d.--he and his significant other voiced understanding.           Orders Placed This Encounter   Procedures   • US Gallbladder     Order Specific Question:   Reason for Exam:     Answer:   epigastric pain     Order Specific Question:   Release to patient     Answer:   Routine Release   • Ambulatory Referral to Gastroenterology     Referral Priority:   Routine     Referral Type:   Consultation     Referral Reason:   Specialty Services Required     Referred to Provider:   Lisandro Holt MD     Requested Specialty:   Gastroenterology     Number of Visits Requested:   1       Follow up: 2 month(s)

## 2022-08-05 NOTE — TELEPHONE ENCOUNTER
Called pt he did see dr. Rankin today - I informed him that u/s has been sent through to scheduling and GI has the referral as well

## 2022-08-11 ENCOUNTER — HOSPITAL ENCOUNTER (OUTPATIENT)
Dept: ULTRASOUND IMAGING | Facility: HOSPITAL | Age: 62
Discharge: HOME | End: 2022-08-11
Admitting: FAMILY MEDICINE

## 2022-08-11 ENCOUNTER — TELEPHONE (OUTPATIENT)
Dept: FAMILY MEDICINE CLINIC | Facility: CLINIC | Age: 62
End: 2022-08-11

## 2022-08-11 PROCEDURE — 76705 ECHO EXAM OF ABDOMEN: CPT

## 2022-08-11 NOTE — TELEPHONE ENCOUNTER
Caller: VIJI GUERRA     Relationship to patient: SELF     Best call back number: [phone]    Patient is needing:STATES HE MISSED A CALL FROM [facility] AND IS RETURNING THE CALL

## 2022-09-06 ENCOUNTER — OFFICE VISIT (OUTPATIENT)
Dept: FAMILY MEDICINE CLINIC | Facility: CLINIC | Age: 62
End: 2022-09-06

## 2022-09-06 VITALS
HEART RATE: 86 BPM | OXYGEN SATURATION: 98 % | RESPIRATION RATE: 16 BRPM | HEIGHT: 72 IN | SYSTOLIC BLOOD PRESSURE: 146 MMHG | WEIGHT: 220 LBS | TEMPERATURE: 97.5 F | BODY MASS INDEX: 29.8 KG/M2 | DIASTOLIC BLOOD PRESSURE: 90 MMHG

## 2022-09-06 DIAGNOSIS — I10 ESSENTIAL HYPERTENSION: Primary | ICD-10-CM

## 2022-09-06 PROCEDURE — 99213 OFFICE O/P EST LOW 20 MIN: CPT | Performed by: FAMILY MEDICINE

## 2022-09-06 RX ORDER — LANSOPRAZOLE 30 MG/1
30 CAPSULE, DELAYED RELEASE ORAL DAILY
COMMUNITY
Start: 2022-07-22 | End: 2023-02-06

## 2022-09-06 RX ORDER — OMEPRAZOLE 40 MG/1
40 CAPSULE, DELAYED RELEASE ORAL DAILY
Qty: 30 CAPSULE | Refills: 1 | Status: SHIPPED | OUTPATIENT
Start: 2022-09-06 | End: 2022-09-28

## 2022-09-06 RX ORDER — LISINOPRIL 10 MG/1
10 TABLET ORAL DAILY
Qty: 30 TABLET | Refills: 11 | Status: SHIPPED | OUTPATIENT
Start: 2022-09-06

## 2022-09-06 NOTE — PROGRESS NOTES
Subjective   Ashley Bradley is a 62 y.o. male.     Chief Complaint   Patient presents with   • Hypertension        History of Present Illness     he notes having elevated bp without cp o rha      Current Outpatient Medications:   •  aspirin 81 MG EC tablet, Take 1 tablet by mouth Daily., Disp: 90 tablet, Rfl: 1  •  atorvastatin (LIPITOR) 40 MG tablet, Take 1 tablet by mouth Daily., Disp: 90 tablet, Rfl: 3  •  diclofenac (VOLTAREN) 75 MG EC tablet, TAKE 1 TABLET BY MOUTH TWICE A DAY, Disp: 180 tablet, Rfl: 0  •  gabapentin (NEURONTIN) 100 MG capsule, TAKE 1 CAPSULE BY MOUTH THREE TIMES A DAY, Disp: 90 capsule, Rfl: 0  •  isosorbide mononitrate (IMDUR) 30 MG 24 hr tablet, Take 1 tablet by mouth Daily., Disp: 30 tablet, Rfl: 1  •  lansoprazole (PREVACID) 30 MG capsule, Take 30 mg by mouth Daily., Disp: , Rfl:   •  metFORMIN ER (GLUCOPHAGE-XR) 500 MG 24 hr tablet, TAKE 2 TABLETS BY MOUTH DAILY WITH BREAKFAST., Disp: 180 tablet, Rfl: 1  •  metoprolol succinate XL (TOPROL-XL) 25 MG 24 hr tablet, Take 1 tablet by mouth Daily., Disp: 30 tablet, Rfl: 11  •  nitroglycerin (NITROSTAT) 0.4 MG SL tablet, 1 under the tongue as needed for angina, may repeat q5mins for up three doses, Disp: 30 tablet, Rfl: 0  •  ranolazine (RANEXA) 500 MG 12 hr tablet, Take 1 tablet by mouth Every 12 (Twelve) Hours., Disp: 60 tablet, Rfl: 5  •  Rybelsus 7 MG tablet, TAKE 1 TABLET BY MOUTH DAILY, Disp: 90 tablet, Rfl: 1  •  ticagrelor (BRILINTA) 90 MG tablet tablet, Take 1 tablet by mouth 2 (Two) Times a Day., Disp: 180 tablet, Rfl: 1  •  tiZANidine (Zanaflex) 4 MG tablet, Take 1 tablet by mouth At Night As Needed for Muscle Spasms., Disp: 20 tablet, Rfl: 0  •  HYDROcodone-acetaminophen (Norco) 5-325 MG per tablet, Take 1 tablet by mouth Every 6 (Six) Hours As Needed for Severe Pain ., Disp: 20 tablet, Rfl: 0  •  lisinopril (PRINIVIL,ZESTRIL) 10 MG tablet, Take 1 tablet by mouth Daily., Disp: 30 tablet, Rfl: 11  Allergies   Allergen Reactions  "  • Penicillamine Unknown - High Severity   • Penicillins        BMI is >= 25 and <30. (Overweight) The following options were offered after discussion;: nutrition counseling/recommendations      Past Medical History:   Diagnosis Date   • Coronary artery disease    • Diabetes mellitus (HCC)    • Diverticula of colon    • GERD (gastroesophageal reflux disease)    • Hyperlipidemia    • Hypertension      Past Surgical History:   Procedure Laterality Date   • CARDIAC CATHETERIZATION     • CARDIAC CATHETERIZATION Right 05/23/2022    Procedure: Left Heart  WITH POSS INTERVENTION, RIGHT RADIAL, US GUIDED;  Surgeon: Earl Flood DO;  Location:  [facility] CATH INVASIVE LOCATION;  Service: Cardiovascular;  Laterality: Right;   • CARDIAC CATHETERIZATION N/A 05/24/2022    Procedure: Percutaneous Coronary Intervention;  Surgeon: Earl Flood DO;  Location:  [facility] CATH INVASIVE LOCATION;  Service: Cardiovascular;  Laterality: N/A;   • COLONOSCOPY  11/01/2010    snare 70, 60, rectum tics recall 5 years   • COLONOSCOPY Left 11/03/2016    2 Tubular adenomas cecum and ascending colon, tics repeat exam in 5 years   • HEMORRHOIDECTOMY     • HERNIA REPAIR     • TONSILLECTOMY         Review of Systems   Constitutional: Negative.    HENT: Negative.    Eyes: Negative.    Respiratory: Negative.    Cardiovascular: Negative.    Gastrointestinal: Negative.    Endocrine: Negative.    Genitourinary: Negative.    Musculoskeletal: Negative.    Skin: Negative.    Allergic/Immunologic: Negative.    Neurological: Negative.    Hematological: Negative.    Psychiatric/Behavioral: Negative.        Objective  /90   Pulse 86   Temp 97.5 °F (36.4 °C)   Resp 16   Ht 182.9 cm (72.01\")   Wt 99.8 kg (220 lb)   SpO2 98%   BMI 29.83 kg/m²   Physical Exam  Vitals reviewed.   Constitutional:       Appearance: He is normal weight.   HENT:      Head: Normocephalic and atraumatic.      Nose: Nose normal.      Mouth/Throat:      " Mouth: Mucous membranes are moist.   Eyes:      Extraocular Movements: Extraocular movements intact.      Pupils: Pupils are equal, round, and reactive to light.   Cardiovascular:      Rate and Rhythm: Normal rate and regular rhythm.      Pulses: Normal pulses.      Heart sounds: Normal heart sounds.   Pulmonary:      Effort: Pulmonary effort is normal.      Breath sounds: Normal breath sounds.   Abdominal:      General: Abdomen is flat. Bowel sounds are normal.      Palpations: Abdomen is soft.   Musculoskeletal:         General: Normal range of motion.      Cervical back: Normal range of motion.   Skin:     General: Skin is warm and dry.      Capillary Refill: Capillary refill takes less than 2 seconds.   Neurological:      General: No focal deficit present.      Mental Status: He is alert and oriented to person, place, and time. Mental status is at baseline.   Psychiatric:         Mood and Affect: Mood normal.     Diabetic foot exam:   Left: Filament test present   Pulses Dorsalis Pedis:  present   Reflexes 3+    Vibratory sensation normal   Proprioception diminished   Sharp/dull discrimination diminished       Right: Filament test present   Pulses Dorsalis Pedis:  present   Reflexes 3+    Vibratory sensation normal   Proprioception diminished   Sharp/dull discrimination diminished      Assessment & Plan   Diagnoses and all orders for this visit:    1. Essential hypertension (Primary)    Other orders  -     lisinopril (PRINIVIL,ZESTRIL) 10 MG tablet; Take 1 tablet by mouth Daily.  Dispense: 30 tablet; Refill: 11        Monitor bp; and keep me oinfonrd       No orders of the defined types were placed in this encounter.      Follow up: 2 month(s)

## 2022-09-08 ENCOUNTER — OFFICE VISIT (OUTPATIENT)
Dept: GASTROENTEROLOGY | Facility: CLINIC | Age: 62
End: 2022-09-08

## 2022-09-08 VITALS
SYSTOLIC BLOOD PRESSURE: 130 MMHG | HEART RATE: 94 BPM | WEIGHT: 218 LBS | DIASTOLIC BLOOD PRESSURE: 80 MMHG | BODY MASS INDEX: 29.53 KG/M2 | OXYGEN SATURATION: 98 % | TEMPERATURE: 96.9 F | HEIGHT: 72 IN

## 2022-09-08 DIAGNOSIS — Z79.01 ANTICOAGULATED: ICD-10-CM

## 2022-09-08 DIAGNOSIS — I10 HTN (HYPERTENSION), BENIGN: ICD-10-CM

## 2022-09-08 DIAGNOSIS — E11.9 CONTROLLED TYPE 2 DIABETES MELLITUS WITHOUT COMPLICATION, WITHOUT LONG-TERM CURRENT USE OF INSULIN: ICD-10-CM

## 2022-09-08 DIAGNOSIS — R10.13 EPIGASTRIC PAIN: Primary | ICD-10-CM

## 2022-09-08 PROCEDURE — 99214 OFFICE O/P EST MOD 30 MIN: CPT | Performed by: CLINICAL NURSE SPECIALIST

## 2022-09-08 NOTE — PROGRESS NOTES
Ashley Bradley  1960    9/8/2022  Chief Complaint   Patient presents with   • GI Problem     Epigastric pain     Subjective   HPI  Ashley Bradley is a 62 y.o. male who presents with a complaint of epigastric pain he went to the ER first of August. Mid sternal and described as a pain not sharp or ache. He rates it a 10 out of 10. He takes Omeprazole daily BID. He did this for 14 days and then once per day and his pain went away. He has not had pain since starting this. No dysphagia. No nausea or vomiting. No wt loss. No fever chills or sweats.   He has a hx of CAD with stents last catheterization 5/2022 Dr Ocasio. He went to [place] and had more placed in August.   Past Medical History:   Diagnosis Date   • Coronary artery disease    • Diabetes mellitus (HCC)    • Diverticula of colon    • GERD (gastroesophageal reflux disease)    • Hyperlipidemia    • Hypertension      Past Surgical History:   Procedure Laterality Date   • CARDIAC CATHETERIZATION     • CARDIAC CATHETERIZATION Right 05/23/2022    Procedure: Left Heart  WITH POSS INTERVENTION, RIGHT RADIAL, US GUIDED;  Surgeon: Earl Flood DO;  Location:  [facility];  Service: Cardiovascular;  Laterality: Right;   • CARDIAC CATHETERIZATION N/A 05/24/2022    Procedure: Percutaneous Coronary Intervention;  Surgeon: Earl Flood DO;  Location:  [facility];  Service: Cardiovascular;  Laterality: N/A;   • COLONOSCOPY  11/01/2010    snare 70, 60, rectum tics recall 5 years   • COLONOSCOPY Left 11/03/2016    2 Tubular adenomas cecum and ascending colon, tics repeat exam in 5 years   • HEMORRHOIDECTOMY     • HERNIA REPAIR     • TONSILLECTOMY         Outpatient Medications Marked as Taking for the 9/8/22 encounter (Office Visit) with Chelly Villar APRN   Medication Sig Dispense Refill   • aspirin 81 MG EC tablet Take 1 tablet by mouth Daily. 90 tablet 1   • atorvastatin (LIPITOR) 40 MG  tablet Take 1 tablet by mouth Daily. 90 tablet 3   • diclofenac (VOLTAREN) 75 MG EC tablet TAKE 1 TABLET BY MOUTH TWICE A  tablet 0   • gabapentin (NEURONTIN) 100 MG capsule TAKE 1 CAPSULE BY MOUTH THREE TIMES A DAY 90 capsule 0   • HYDROcodone-acetaminophen (Norco) 5-325 MG per tablet Take 1 tablet by mouth Every 6 (Six) Hours As Needed for Severe Pain . 20 tablet 0   • isosorbide mononitrate (IMDUR) 30 MG 24 hr tablet Take 1 tablet by mouth Daily. 30 tablet 1   • lansoprazole (PREVACID) 30 MG capsule Take 30 mg by mouth Daily.     • lisinopril (PRINIVIL,ZESTRIL) 10 MG tablet Take 1 tablet by mouth Daily. 30 tablet 11   • metFORMIN ER (GLUCOPHAGE-XR) 500 MG 24 hr tablet TAKE 2 TABLETS BY MOUTH DAILY WITH BREAKFAST. 180 tablet 1   • metoprolol succinate XL (TOPROL-XL) 25 MG 24 hr tablet Take 1 tablet by mouth Daily. 30 tablet 11   • nitroglycerin (NITROSTAT) 0.4 MG SL tablet 1 under the tongue as needed for angina, may repeat q5mins for up three doses 30 tablet 0   • omeprazole (priLOSEC) 40 MG capsule Take 1 capsule by mouth Daily. 30 capsule 1   • ranolazine (RANEXA) 500 MG 12 hr tablet Take 1 tablet by mouth Every 12 (Twelve) Hours. 60 tablet 5   • Rybelsus 7 MG tablet TAKE 1 TABLET BY MOUTH DAILY 90 tablet 1   • ticagrelor (BRILINTA) 90 MG tablet tablet Take 1 tablet by mouth 2 (Two) Times a Day. 180 tablet 1   • tiZANidine (Zanaflex) 4 MG tablet Take 1 tablet by mouth At Night As Needed for Muscle Spasms. 20 tablet 0     Allergies   Allergen Reactions   • Penicillamine Unknown - High Severity   • Penicillins      Social History     Socioeconomic History   • Marital status: Single   Tobacco Use   • Smoking status: Former Smoker     Packs/day: 0.25     Years: 40.00     Pack years: 10.00     Types: Cigarettes   • Smokeless tobacco: Current User     Types: Snuff   Vaping Use   • Vaping Use: Never used   Substance and Sexual Activity   • Alcohol use: No   • Drug use: No   • Sexual activity: Defer     Family  History   Problem Relation Age of Onset   • Ulcerative colitis Mother    • Valvular heart disease Mother    • Heart murmur Father    • Colon cancer Neg Hx    • Colon polyps Neg Hx      Health Maintenance   Topic Date Due   • Pneumococcal Vaccine 0-64 (1 - PCV) Never done   • TDAP/TD VACCINES (1 - Tdap) Never done   • ZOSTER VACCINE (1 of 2) Never done   • COVID-19 Vaccine (3 - Booster for Pfizer series) 12/16/2021   • LIPID PANEL  08/27/2022   • URINE MICROALBUMIN  08/27/2022   • DIABETIC EYE EXAM  10/25/2022 (Originally 2/17/2022)   • INFLUENZA VACCINE  10/01/2022   • HEMOGLOBIN A1C  01/29/2023   • ANNUAL PHYSICAL  03/31/2023   • DIABETIC FOOT EXAM  09/06/2023   • COLORECTAL CANCER SCREENING  11/03/2026   • HEPATITIS C SCREENING  Completed     Review of Systems   Constitutional: Negative for activity change, appetite change, chills, diaphoresis, fatigue, fever and unexpected weight change.   HENT: Negative for ear pain, hearing loss, mouth sores, sore throat, trouble swallowing and voice change.    Eyes: Negative.    Respiratory: Negative for cough, choking, shortness of breath and wheezing.    Cardiovascular: Negative for chest pain and palpitations.   Gastrointestinal: Negative for abdominal pain, blood in stool, constipation, diarrhea, nausea and vomiting.   Endocrine: Negative for cold intolerance and heat intolerance.   Genitourinary: Negative for decreased urine volume, dysuria, frequency, hematuria and urgency.   Musculoskeletal: Negative for back pain, gait problem and myalgias.   Skin: Negative for color change, pallor and rash.   Allergic/Immunologic: Negative for food allergies and immunocompromised state.   Neurological: Negative for dizziness, tremors, seizures, syncope, weakness, light-headedness, numbness and headaches.   Hematological: Negative for adenopathy. Does not bruise/bleed easily.   Psychiatric/Behavioral: Negative for agitation and confusion. The patient is not nervous/anxious.    All  "other systems reviewed and are negative.    Objective   Vitals:    09/08/22 1000   BP: 130/80   Pulse: 94   Temp: 96.9 °F (36.1 °C)   SpO2: 98%   Weight: 98.9 kg (218 lb)   Height: 182.9 cm (72\")     Body mass index is 29.57 kg/m².  Physical Exam  Constitutional:       Appearance: He is well-developed.   HENT:      Head: Normocephalic and atraumatic.   Eyes:      Pupils: Pupils are equal, round, and reactive to light.   Neck:      Trachea: No tracheal deviation.   Cardiovascular:      Rate and Rhythm: Normal rate and regular rhythm.      Heart sounds: Normal heart sounds. No murmur heard.    No friction rub. No gallop.   Pulmonary:      Effort: Pulmonary effort is normal. No respiratory distress.      Breath sounds: Normal breath sounds. No wheezing or rales.   Chest:      Chest wall: No tenderness.   Abdominal:      General: Bowel sounds are normal. There is no distension.      Palpations: Abdomen is soft. Abdomen is not rigid.      Tenderness: There is no abdominal tenderness. There is no guarding or rebound.   Musculoskeletal:         General: No tenderness or deformity. Normal range of motion.      Cervical back: Normal range of motion and neck supple.   Skin:     General: Skin is warm and dry.      Coloration: Skin is not pale.      Findings: No rash.   Neurological:      Mental Status: He is alert and oriented to person, place, and time.      Deep Tendon Reflexes: Reflexes are normal and symmetric.   Psychiatric:         Behavior: Behavior normal.         Thought Content: Thought content normal.         Judgment: Judgment normal.       Assessment & Plan   Diagnoses and all orders for this visit:    1. Epigastric pain (Primary)  -     FL Upper GI With Air Contrast; Future    2. Anticoagulated  Comments:  Brilinta last stents were placed August 2022.    3. Controlled type 2 diabetes mellitus without complication, without long-term current use of insulin (HCC)    4. HTN (hypertension), benign    I discussed non " pharmaceutical treatment of gerd.  This includes gradually losing weight to achieve ideal body wt., elevation of the head of bed by 4-6 inches, nothing to eat or drink 3 hours prior to lying down, avoiding tight clothing, stress reduction, tobacco cessation, reduction of alcohol intake, and dietary restrictions (avoiding caffeine, coffee, fatty foods, mints, chocolate, spicy foods and tomato based sauces as much as possible).    Continue Omeprazole for his symptoms this is working for him  Gaviscon as needed  He is due for colonoscopy as well but with new stents the risk of bleeding and or restenosis of his new stents is greater than the benefit    Part of this note may be an electronic transcription/translation of spoken language to printed text using the Dragon Dictation System.  Body mass index is 29.57 kg/m².  Return in about 3 weeks (around 9/29/2022).    BMI is >= 25 and <30. (Overweight) The following options were offered after discussion;: weight loss educational material (shared in after visit summary)      All risks, benefits, alternatives, and indications of colonoscopy and/or Endoscopy procedure have been discussed with the patient. Risks to include perforation of the colon requiring possible surgery or colostomy, risk of bleeding from biopsies or removal of colon tissue, possibility of missing a colon polyp or cancer, or adverse drug reaction.  Benefits to include the diagnosis and management of disease of the colon and rectum. Alternatives to include barium enema, radiographic evaluation, lab testing or no intervention. Pt verbalizes understanding and agrees.     Chelly Villar, APRN  9/8/2022  11:03 CDT          If you smoke or use tobacco, 4 minutes reading provided  Steps to Quit Smoking  Smoking tobacco can be harmful to your health and can affect almost every organ in your body. Smoking puts you, and those around you, at risk for developing many serious chronic diseases. Quitting smoking is  difficult, but it is one of the best things that you can do for your health. It is never too late to quit.  What are the benefits of quitting smoking?  When you quit smoking, you lower your risk of developing serious diseases and conditions, such as:  · Lung cancer or lung disease, such as COPD.  · Heart disease.  · Stroke.  · Heart attack.  · Infertility.  · Osteoporosis and bone fractures.  Additionally, symptoms such as coughing, wheezing, and shortness of breath may get better when you quit. You may also find that you get sick less often because your body is stronger at fighting off colds and infections. If you are pregnant, quitting smoking can help to reduce your chances of having a baby of low birth weight.  How do I get ready to quit?  When you decide to quit smoking, create a plan to make sure that you are successful. Before you quit:  · Pick a date to quit. Set a date within the next two weeks to give you time to prepare.  · Write down the reasons why you are quitting. Keep this list in places where you will see it often, such as on your bathroom mirror or in your car or wallet.  · Identify the people, places, things, and activities that make you want to smoke (triggers) and avoid them. Make sure to take these actions:  ¨ Throw away all cigarettes at home, at work, and in your car.  ¨ Throw away smoking accessories, such as ashtrays and lighters.  ¨ Clean your car and make sure to empty the ashtray.  ¨ Clean your home, including curtains and carpets.  · Tell your family, friends, and coworkers that you are quitting. Support from your loved ones can make quitting easier.  · Talk with your health care provider about your options for quitting smoking.  · Find out what treatment options are covered by your health insurance.  What strategies can I use to quit smoking?  Talk with your healthcare provider about different strategies to quit smoking. Some strategies include:  · Quitting smoking altogether instead  of gradually lessening how much you smoke over a period of time. Research shows that quitting “cold turkey” is more successful than gradually quitting.  · Attending in-person counseling to help you build problem-solving skills. You are more likely to have success in quitting if you attend several counseling sessions. Even short sessions of 10 minutes can be effective.  · Finding resources and support systems that can help you to quit smoking and remain smoke-free after you quit. These resources are most helpful when you use them often. They can include:  ¨ Online chats with a counselor.  ¨ Telephone quitlines.  ¨ Printed self-help materials.  ¨ Support groups or group counseling.  ¨ Text messaging programs.  ¨ Mobile phone applications.  · Taking medicines to help you quit smoking. (If you are pregnant or breastfeeding, talk with your health care provider first.) Some medicines contain nicotine and some do not. Both types of medicines help with cravings, but the medicines that include nicotine help to relieve withdrawal symptoms. Your health care provider may recommend:  ¨ Nicotine patches, gum, or lozenges.  ¨ Nicotine inhalers or sprays.  ¨ Non-nicotine medicine that is taken by mouth.  Talk with your health care provider about combining strategies, such as taking medicines while you are also receiving in-person counseling. Using these two strategies together makes you more likely to succeed in quitting than if you used either strategy on its own.  If you are pregnant or breastfeeding, talk with your health care provider about finding counseling or other support strategies to quit smoking. Do not take medicine to help you quit smoking unless told to do so by your health care provider.  What things can I do to make it easier to quit?  Quitting smoking might feel overwhelming at first, but there is a lot that you can do to make it easier. Take these important actions:  · Reach out to your family and friends and ask  that they support and encourage you during this time. Call telephone quitlines, reach out to support groups, or work with a counselor for support.  · Ask people who smoke to avoid smoking around you.  · Avoid places that trigger you to smoke, such as bars, parties, or smoke-break areas at work.  · Spend time around people who do not smoke.  · Lessen stress in your life, because stress can be a smoking trigger for some people. To lessen stress, try:  ¨ Exercising regularly.  ¨ Deep-breathing exercises.  ¨ Yoga.  ¨ Meditating.  ¨ Performing a body scan. This involves closing your eyes, scanning your body from head to toe, and noticing which parts of your body are particularly tense. Purposefully relax the muscles in those areas.  · Download or purchase mobile phone or tablet apps (applications) that can help you stick to your quit plan by providing reminders, tips, and encouragement. There are many free apps, such as QuitGuide from the CDC (Centers for Disease Control and Prevention). You can find other support for quitting smoking (smoking cessation) through smokefree.gov and other websites.  How will I feel when I quit smoking?  Within the first 24 hours of quitting smoking, you may start to feel some withdrawal symptoms. These symptoms are usually most noticeable 2-3 days after quitting, but they usually do not last beyond 2-3 weeks. Changes or symptoms that you might experience include:  · Mood swings.  · Restlessness, anxiety, or irritation.  · Difficulty concentrating.  · Dizziness.  · Strong cravings for sugary foods in addition to nicotine.  · Mild weight gain.  · Constipation.  · Nausea.  · Coughing or a sore throat.  · Changes in how your medicines work in your body.  · A depressed mood.  · Difficulty sleeping (insomnia).  After the first 2-3 weeks of quitting, you may start to notice more positive results, such as:  · Improved sense of smell and taste.  · Decreased coughing and sore throat.  · Slower heart  rate.  · Lower blood pressure.  · Clearer skin.  · The ability to breathe more easily.  · Fewer sick days.  Quitting smoking is very challenging for most people. Do not get discouraged if you are not successful the first time. Some people need to make many attempts to quit before they achieve long-term success. Do your best to stick to your quit plan, and talk with your health care provider if you have any questions or concerns.  This information is not intended to replace advice given to you by your health care provider. Make sure you discuss any questions you have with your health care provider.  Document Released: 12/12/2002 Document Revised: 08/15/2017 Document Reviewed: 05/03/2016  Elsevier Interactive Patient Education © 2017 [employer]

## 2022-09-15 ENCOUNTER — HOSPITAL ENCOUNTER (OUTPATIENT)
Dept: GENERAL RADIOLOGY | Facility: HOSPITAL | Age: 62
Discharge: HOME | End: 2022-09-15
Admitting: CLINICAL NURSE SPECIALIST

## 2022-09-15 DIAGNOSIS — R10.13 EPIGASTRIC PAIN: ICD-10-CM

## 2022-09-15 PROCEDURE — 74246 X-RAY XM UPR GI TRC 2CNTRST: CPT

## 2022-09-15 RX ADMIN — BARIUM SULFATE 120 ML: 980 POWDER, FOR SUSPENSION ORAL at 08:10

## 2022-09-15 RX ADMIN — ANTACID/ANTIFLATULENT 1 PACKET: 380; 550; 10; 10 GRANULE, EFFERVESCENT ORAL at 08:10

## 2022-09-21 ENCOUNTER — TELEPHONE (OUTPATIENT)
Dept: CARDIOLOGY | Facility: CLINIC | Age: 62
End: 2022-09-21

## 2022-09-23 ENCOUNTER — TELEPHONE (OUTPATIENT)
Dept: GASTROENTEROLOGY | Facility: CLINIC | Age: 62
End: 2022-09-23

## 2022-09-24 DIAGNOSIS — M79.2 NEURALGIA AND NEURITIS, UNSPECIFIED: ICD-10-CM

## 2022-09-26 RX ORDER — GABAPENTIN 100 MG/1
CAPSULE ORAL
Qty: 90 CAPSULE | Refills: 0 | Status: SHIPPED | OUTPATIENT
Start: 2022-09-26

## 2022-09-28 RX ORDER — OMEPRAZOLE 40 MG/1
CAPSULE, DELAYED RELEASE ORAL
Qty: 30 CAPSULE | Refills: 1 | Status: SHIPPED | OUTPATIENT
Start: 2022-09-28 | End: 2022-09-29 | Stop reason: SDUPTHER

## 2022-09-29 RX ORDER — OMEPRAZOLE 40 MG/1
40 CAPSULE, DELAYED RELEASE ORAL DAILY
Qty: 90 CAPSULE | Refills: 1 | Status: SHIPPED | OUTPATIENT
Start: 2022-09-29 | End: 2022-10-05 | Stop reason: SDUPTHER

## 2022-10-03 RX ORDER — DICLOFENAC SODIUM 75 MG/1
TABLET, DELAYED RELEASE ORAL
Qty: 180 TABLET | Refills: 0 | Status: SHIPPED | OUTPATIENT
Start: 2022-10-03 | End: 2023-01-03

## 2022-10-05 RX ORDER — OMEPRAZOLE 40 MG/1
40 CAPSULE, DELAYED RELEASE ORAL DAILY
Qty: 90 CAPSULE | Refills: 1 | Status: SHIPPED | OUTPATIENT
Start: 2022-10-05

## 2022-10-06 NOTE — TELEPHONE ENCOUNTER
Send Dr Mccain the approval to come off. He is the one that did the last stent at [facility]. Let the patient know what we are doing and if he is still having epigastric pain we can offer upper GI or esophagram to valorie.   Thanks  Chelly Villar, APRN  10/6/2022

## 2022-10-13 ENCOUNTER — OFFICE VISIT (OUTPATIENT)
Dept: FAMILY MEDICINE CLINIC | Facility: CLINIC | Age: 62
End: 2022-10-13

## 2022-10-13 VITALS
WEIGHT: 218 LBS | DIASTOLIC BLOOD PRESSURE: 80 MMHG | HEART RATE: 89 BPM | HEIGHT: 72 IN | SYSTOLIC BLOOD PRESSURE: 142 MMHG | BODY MASS INDEX: 29.53 KG/M2 | OXYGEN SATURATION: 95 %

## 2022-10-13 DIAGNOSIS — E11.9 TYPE 2 DIABETES MELLITUS WITHOUT COMPLICATION, WITHOUT LONG-TERM CURRENT USE OF INSULIN: Primary | ICD-10-CM

## 2022-10-13 DIAGNOSIS — I10 BENIGN ESSENTIAL HYPERTENSION: ICD-10-CM

## 2022-10-13 DIAGNOSIS — Z23 NEED FOR VACCINATION: ICD-10-CM

## 2022-10-13 DIAGNOSIS — E78.2 MIXED HYPERLIPIDEMIA: ICD-10-CM

## 2022-10-13 DIAGNOSIS — I25.10 CORONARY ARTERY DISEASE INVOLVING NATIVE HEART, UNSPECIFIED VESSEL OR LESION TYPE, UNSPECIFIED WHETHER ANGINA PRESENT: ICD-10-CM

## 2022-10-13 PROCEDURE — 99213 OFFICE O/P EST LOW 20 MIN: CPT | Performed by: FAMILY MEDICINE

## 2022-10-13 PROCEDURE — 90686 IIV4 VACC NO PRSV 0.5 ML IM: CPT | Performed by: FAMILY MEDICINE

## 2022-10-13 PROCEDURE — 90471 IMMUNIZATION ADMIN: CPT | Performed by: FAMILY MEDICINE

## 2022-10-13 NOTE — PROGRESS NOTES
Subjective   Ashley Bradley is a 62 y.o. male.     Chief Complaint   Patient presents with   • Hypertension   • Hyperlipidemia   • Coronary Artery Disease   • Diabetes   • Heartburn       History of Present Illness     he noes good bp control witout cp or ha--he denies any angina symptoms--he thinks bs are good --he walks aprrox 2 mils per day--his gerd symtoms are stable wtihjotu dysphgia      Current Outpatient Medications:   •  aspirin 81 MG EC tablet, Take 1 tablet by mouth Daily., Disp: 90 tablet, Rfl: 1  •  atorvastatin (LIPITOR) 40 MG tablet, Take 1 tablet by mouth Daily., Disp: 90 tablet, Rfl: 3  •  diclofenac (VOLTAREN) 75 MG EC tablet, TAKE 1 TABLET BY MOUTH TWICE A DAY, Disp: 180 tablet, Rfl: 0  •  gabapentin (NEURONTIN) 100 MG capsule, TAKE 1 CAPSULE BY MOUTH THREE TIMES A DAY, Disp: 90 capsule, Rfl: 0  •  HYDROcodone-acetaminophen (Norco) 5-325 MG per tablet, Take 1 tablet by mouth Every 6 (Six) Hours As Needed for Severe Pain ., Disp: 20 tablet, Rfl: 0  •  lansoprazole (PREVACID) 30 MG capsule, Take 30 mg by mouth Daily., Disp: , Rfl:   •  lisinopril (PRINIVIL,ZESTRIL) 10 MG tablet, Take 1 tablet by mouth Daily., Disp: 30 tablet, Rfl: 11  •  metFORMIN ER (GLUCOPHAGE-XR) 500 MG 24 hr tablet, TAKE 2 TABLETS BY MOUTH DAILY WITH BREAKFAST., Disp: 180 tablet, Rfl: 1  •  metoprolol succinate XL (TOPROL-XL) 25 MG 24 hr tablet, Take 1 tablet by mouth Daily., Disp: 30 tablet, Rfl: 11  •  nitroglycerin (NITROSTAT) 0.4 MG SL tablet, 1 under the tongue as needed for angina, may repeat q5mins for up three doses, Disp: 30 tablet, Rfl: 0  •  omeprazole (priLOSEC) 40 MG capsule, Take 1 capsule by mouth Daily., Disp: 90 capsule, Rfl: 1  •  ranolazine (RANEXA) 500 MG 12 hr tablet, Take 1 tablet by mouth Every 12 (Twelve) Hours., Disp: 60 tablet, Rfl: 5  •  Rybelsus 7 MG tablet, TAKE 1 TABLET BY MOUTH DAILY, Disp: 90 tablet, Rfl: 1  •  ticagrelor (BRILINTA) 90 MG tablet tablet, Take 1 tablet by mouth 2 (Two) Times  a Day., Disp: 180 tablet, Rfl: 1  •  tiZANidine (Zanaflex) 4 MG tablet, Take 1 tablet by mouth At Night As Needed for Muscle Spasms., Disp: 20 tablet, Rfl: 0  •  isosorbide mononitrate (IMDUR) 30 MG 24 hr tablet, Take 1 tablet by mouth Daily., Disp: 30 tablet, Rfl: 1  Allergies   Allergen Reactions   • Penicillamine Unknown - High Severity   • Penicillins        BMI is >= 25 and <30. (Overweight) The following options were offered after discussion;: nutrition counseling/recommendations      Past Medical History:   Diagnosis Date   • Coronary artery disease    • Diabetes mellitus (HCC)    • Diverticula of colon    • GERD (gastroesophageal reflux disease)    • Hyperlipidemia    • Hypertension      Past Surgical History:   Procedure Laterality Date   • CARDIAC CATHETERIZATION     • CARDIAC CATHETERIZATION Right 05/23/2022    Procedure: Left Heart  WITH POSS INTERVENTION, RIGHT RADIAL, US GUIDED;  Surgeon: Earl Flood DO;  Location:  [facility];  Service: Cardiovascular;  Laterality: Right;   • CARDIAC CATHETERIZATION N/A 05/24/2022    Procedure: Percutaneous Coronary Intervention;  Surgeon: Earl Flood DO;  Location:  [facility];  Service: Cardiovascular;  Laterality: N/A;   • COLONOSCOPY  11/01/2010    snare 70, 60, rectum tics recall 5 years   • COLONOSCOPY Left 11/03/2016    2 Tubular adenomas cecum and ascending colon, tics repeat exam in 5 years   • HEMORRHOIDECTOMY     • HERNIA REPAIR     • TONSILLECTOMY         Review of Systems   Constitutional: Negative.    HENT: Negative.    Eyes: Negative.    Respiratory: Negative.    Cardiovascular: Negative.    Gastrointestinal: Negative.    Endocrine: Negative.    Genitourinary: Negative.    Musculoskeletal: Negative.    Skin: Negative.    Allergic/Immunologic: Negative.    Neurological: Negative.    Hematological: Negative.    Psychiatric/Behavioral: Negative.        Objective  /80   Pulse 89   Ht  "182.9 cm (72\")   Wt 98.9 kg (218 lb)   SpO2 95%   BMI 29.57 kg/m²   Physical Exam  Vitals and nursing note reviewed.   Constitutional:       Appearance: Normal appearance. He is normal weight.   HENT:      Head: Normocephalic and atraumatic.      Nose: Nose normal.      Mouth/Throat:      Mouth: Mucous membranes are moist.   Eyes:      Extraocular Movements: Extraocular movements intact.      Conjunctiva/sclera: Conjunctivae normal.      Pupils: Pupils are equal, round, and reactive to light.   Cardiovascular:      Rate and Rhythm: Normal rate and regular rhythm.      Pulses: Normal pulses.   Pulmonary:      Effort: Pulmonary effort is normal.      Breath sounds: Normal breath sounds.   Abdominal:      General: Abdomen is flat. Bowel sounds are normal.      Palpations: Abdomen is soft.   Musculoskeletal:         General: Normal range of motion.      Cervical back: Normal range of motion.   Skin:     General: Skin is warm.      Capillary Refill: Capillary refill takes less than 2 seconds.   Neurological:      General: No focal deficit present.      Mental Status: He is alert and oriented to person, place, and time. Mental status is at baseline.   Psychiatric:         Mood and Affect: Mood normal.         Assessment & Plan   Diagnoses and all orders for this visit:    1. Type 2 diabetes mellitus without complication, without long-term current use of insulin (HCC) (Primary)    2. Benign essential hypertension    3. Coronary artery disease involving native heart, unspecified vessel or lesion type, unspecified whether angina present    4. Mixed hyperlipidemia    Other orders  -     FluLaval/Fluzone >6 mos ([id number])      He will monitor bp and bs and keep me informd           Orders Placed This Encounter   Procedures   • FluLaval/Fluzone >6 mos ([id number])       Follow up: 2 month(s) with labs  "

## 2022-10-25 ENCOUNTER — TELEPHONE (OUTPATIENT)
Dept: GASTROENTEROLOGY | Facility: CLINIC | Age: 62
End: 2022-10-25

## 2022-11-23 RX ORDER — ASPIRIN 81 MG/1
TABLET ORAL
Qty: 90 TABLET | Refills: 1 | OUTPATIENT
Start: 2022-11-23

## 2022-12-13 RX ORDER — TICAGRELOR 90 MG/1
TABLET ORAL
Qty: 180 TABLET | Refills: 1 | OUTPATIENT
Start: 2022-12-13

## 2022-12-13 RX ORDER — RANOLAZINE 500 MG/1
TABLET, EXTENDED RELEASE ORAL
Qty: 180 TABLET | Refills: 1 | OUTPATIENT
Start: 2022-12-13

## 2023-01-03 RX ORDER — DICLOFENAC SODIUM 75 MG/1
TABLET, DELAYED RELEASE ORAL
Qty: 180 TABLET | Refills: 0 | Status: SHIPPED | OUTPATIENT
Start: 2023-01-03 | End: 2023-03-02

## 2023-01-03 RX ORDER — ORAL SEMAGLUTIDE 7 MG/1
TABLET ORAL
Qty: 90 TABLET | Refills: 1 | Status: SHIPPED | OUTPATIENT
Start: 2023-01-03

## 2023-01-03 RX ORDER — METFORMIN HYDROCHLORIDE 500 MG/1
TABLET, EXTENDED RELEASE ORAL
Qty: 180 TABLET | Refills: 1 | Status: SHIPPED | OUTPATIENT
Start: 2023-01-03

## 2023-01-03 NOTE — TELEPHONE ENCOUNTER
Rx Refill Note  Requested Prescriptions     Pending Prescriptions Disp Refills   • diclofenac (VOLTAREN) 75 MG EC tablet [Pharmacy Med Name: DICLOFENAC SOD EC 75 MG TAB] 180 tablet 0     Sig: TAKE 1 TABLET BY MOUTH TWICE A DAY   • metFORMIN ER (GLUCOPHAGE-XR) 500 MG 24 hr tablet [Pharmacy Med Name: METFORMIN HCL  MG TABLET] 180 tablet 1     Sig: TAKE 2 TABLETS BY MOUTH DAILY WITH BREAKFAST.   • Rybelsus 7 MG tablet [Pharmacy Med Name: RYBELSUS 7 MG TABLET] 90 tablet 1     Sig: TAKE 1 TABLET BY MOUTH EVERY DAY      Last office visit with prescribing clinician: 10/13/2022   Last telemedicine visit with prescribing clinician: Visit date not found   Next office visit with prescribing clinician: Visit date not found                         Would you like a call back once the refill request has been completed: [] Yes [] No    If the office needs to give you a call back, can they leave a voicemail: [] Yes [] No    Elaina Chavez, PCT  01/03/23, 08:41 CST

## 2023-02-04 DIAGNOSIS — K21.9 GASTRO-ESOPHAGEAL REFLUX DISEASE WITHOUT ESOPHAGITIS: ICD-10-CM

## 2023-02-06 RX ORDER — LANSOPRAZOLE 30 MG/1
CAPSULE, DELAYED RELEASE ORAL
Qty: 90 CAPSULE | Refills: 1 | Status: SHIPPED | OUTPATIENT
Start: 2023-02-06

## 2023-03-02 RX ORDER — RANOLAZINE 500 MG/1
TABLET, EXTENDED RELEASE ORAL
Qty: 180 TABLET | Refills: 1 | OUTPATIENT
Start: 2023-03-02

## 2023-03-02 RX ORDER — SALICYLIC ACID 40 %
ADHESIVE PATCH, MEDICATED TOPICAL
Qty: 90 TABLET | Refills: 1 | OUTPATIENT
Start: 2023-03-02

## 2023-03-02 RX ORDER — DICLOFENAC SODIUM 75 MG/1
TABLET, DELAYED RELEASE ORAL
Qty: 180 TABLET | Refills: 0 | Status: SHIPPED | OUTPATIENT
Start: 2023-03-02

## 2023-03-07 ENCOUNTER — OFFICE VISIT (OUTPATIENT)
Dept: FAMILY MEDICINE CLINIC | Facility: CLINIC | Age: 63
End: 2023-03-07
Payer: COMMERCIAL

## 2023-03-07 VITALS
HEART RATE: 80 BPM | SYSTOLIC BLOOD PRESSURE: 140 MMHG | DIASTOLIC BLOOD PRESSURE: 74 MMHG | WEIGHT: 235 LBS | BODY MASS INDEX: 31.83 KG/M2 | RESPIRATION RATE: 16 BRPM | TEMPERATURE: 98 F | OXYGEN SATURATION: 98 % | HEIGHT: 72 IN

## 2023-03-07 DIAGNOSIS — I25.10 CORONARY ARTERY DISEASE INVOLVING NATIVE HEART, UNSPECIFIED VESSEL OR LESION TYPE, UNSPECIFIED WHETHER ANGINA PRESENT: ICD-10-CM

## 2023-03-07 DIAGNOSIS — E78.2 MIXED HYPERLIPIDEMIA: ICD-10-CM

## 2023-03-07 DIAGNOSIS — Z12.5 SCREENING FOR MALIGNANT NEOPLASM OF PROSTATE: ICD-10-CM

## 2023-03-07 DIAGNOSIS — I10 ESSENTIAL HYPERTENSION: Primary | ICD-10-CM

## 2023-03-07 DIAGNOSIS — E11.9 TYPE 2 DIABETES MELLITUS WITHOUT COMPLICATION, WITHOUT LONG-TERM CURRENT USE OF INSULIN: ICD-10-CM

## 2023-03-07 DIAGNOSIS — Z72.0 TOBACCO USE: ICD-10-CM

## 2023-03-07 DIAGNOSIS — M54.50 ACUTE MIDLINE LOW BACK PAIN WITHOUT SCIATICA: ICD-10-CM

## 2023-03-07 PROCEDURE — 99214 OFFICE O/P EST MOD 30 MIN: CPT | Performed by: FAMILY MEDICINE

## 2023-03-07 PROCEDURE — 99406 BEHAV CHNG SMOKING 3-10 MIN: CPT | Performed by: FAMILY MEDICINE

## 2023-03-07 RX ORDER — METHYLPREDNISOLONE 4 MG/1
TABLET ORAL
Qty: 21 TABLET | Refills: 0 | Status: SHIPPED | OUTPATIENT
Start: 2023-03-07

## 2023-03-07 RX ORDER — ORPHENADRINE CITRATE 100 MG/1
100 TABLET, EXTENDED RELEASE ORAL 2 TIMES DAILY
Qty: 30 TABLET | Refills: 0 | Status: SHIPPED | OUTPATIENT
Start: 2023-03-07

## 2023-03-07 NOTE — PROGRESS NOTES
Subjective   Ashley Bradley is a 62 y.o. male.     Chief Complaint   Patient presents with   • Hypertension   • Diabetes       History of Present Illness     Ashley picked up his bull dog at home last week and injured his lower back --denieis any sciatica symptoms---his bs an bp have been good shome  He denies any angina symptoms    Current Outpatient Medications:   •  aspirin 81 MG EC tablet, Take 1 tablet by mouth Daily., Disp: 90 tablet, Rfl: 1  •  atorvastatin (LIPITOR) 40 MG tablet, Take 1 tablet by mouth Daily., Disp: 90 tablet, Rfl: 3  •  diclofenac (VOLTAREN) 75 MG EC tablet, TAKE 1 TABLET BY MOUTH TWICE A DAY, Disp: 180 tablet, Rfl: 0  •  gabapentin (NEURONTIN) 100 MG capsule, TAKE 1 CAPSULE BY MOUTH THREE TIMES A DAY, Disp: 90 capsule, Rfl: 0  •  lisinopril (PRINIVIL,ZESTRIL) 10 MG tablet, Take 1 tablet by mouth Daily., Disp: 30 tablet, Rfl: 11  •  metFORMIN ER (GLUCOPHAGE-XR) 500 MG 24 hr tablet, TAKE 2 TABLETS BY MOUTH DAILY WITH BREAKFAST., Disp: 180 tablet, Rfl: 1  •  metoprolol succinate XL (TOPROL-XL) 25 MG 24 hr tablet, Take 1 tablet by mouth Daily., Disp: 30 tablet, Rfl: 11  •  nitroglycerin (NITROSTAT) 0.4 MG SL tablet, 1 under the tongue as needed for angina, may repeat q5mins for up three doses, Disp: 30 tablet, Rfl: 0  •  omeprazole (priLOSEC) 40 MG capsule, Take 1 capsule by mouth Daily., Disp: 90 capsule, Rfl: 1  •  Rybelsus 7 MG tablet, TAKE 1 TABLET BY MOUTH EVERY DAY, Disp: 90 tablet, Rfl: 1  •  ticagrelor (BRILINTA) 90 MG tablet tablet, Take 1 tablet by mouth 2 (Two) Times a Day., Disp: 180 tablet, Rfl: 1  •  lansoprazole (PREVACID) 30 MG capsule, TAKE 1 CAPSULE BY MOUTH EVERY DAY, Disp: 90 capsule, Rfl: 1  •  methylPREDNISolone (MEDROL) 4 MG dose pack, Take as directed on package instructions., Disp: 21 tablet, Rfl: 0  •  orphenadrine (NORFLEX) 100 MG 12 hr tablet, Take 1 tablet by mouth 2 (Two) Times a Day., Disp: 30 tablet, Rfl: 0  Allergies   Allergen Reactions   • Penicillamine  "Unknown - High Severity   • Penicillins        BMI is >= 30 and <35. (Class 1 Obesity). The following options were offered after discussion;: nutrition counseling/recommendations      Past Medical History:   Diagnosis Date   • Coronary artery disease    • Diabetes mellitus (HCC)    • Diverticula of colon    • GERD (gastroesophageal reflux disease)    • Hyperlipidemia    • Hypertension      Past Surgical History:   Procedure Laterality Date   • CARDIAC CATHETERIZATION     • CARDIAC CATHETERIZATION Right 05/23/2022    Procedure: Left Heart  WITH POSS INTERVENTION, RIGHT RADIAL, US GUIDED;  Surgeon: Earl Flood DO;  Location:  [facility];  Service: Cardiovascular;  Laterality: Right;   • CARDIAC CATHETERIZATION N/A 05/24/2022    Procedure: Percutaneous Coronary Intervention;  Surgeon: Earl Flood DO;  Location:  [facility];  Service: Cardiovascular;  Laterality: N/A;   • COLONOSCOPY  11/01/2010    snare 70, 60, rectum tics recall 5 years   • COLONOSCOPY Left 11/03/2016    2 Tubular adenomas cecum and ascending colon, tics repeat exam in 5 years   • HEMORRHOIDECTOMY     • HERNIA REPAIR     • TONSILLECTOMY         Review of Systems   Constitutional: Negative.    HENT: Negative.    Eyes: Negative.    Respiratory: Negative.    Cardiovascular: Negative.    Gastrointestinal: Negative.    Endocrine: Negative.    Genitourinary: Negative.    Musculoskeletal: Positive for back pain.   Skin: Negative.    Allergic/Immunologic: Negative.    Neurological: Negative.    Hematological: Negative.    Psychiatric/Behavioral: Negative.        Objective  /74   Pulse 80   Temp 98 °F (36.7 °C)   Resp 16   Ht 182.9 cm (72.01\")   Wt 107 kg (235 lb)   SpO2 98%   BMI 31.86 kg/m²   Physical Exam  Vitals and nursing note reviewed.   Constitutional:       Appearance: Normal appearance.   HENT:      Head: Normocephalic and atraumatic.      Right Ear: Ear canal normal.      " Left Ear: Ear canal normal.      Nose: Nose normal.      Mouth/Throat:      Mouth: Mucous membranes are dry.   Eyes:      Extraocular Movements: Extraocular movements intact.      Pupils: Pupils are equal, round, and reactive to light.   Cardiovascular:      Rate and Rhythm: Normal rate and regular rhythm.      Pulses: Normal pulses.      Heart sounds: Normal heart sounds.   Pulmonary:      Effort: Pulmonary effort is normal.   Abdominal:      General: Abdomen is flat. Bowel sounds are normal.   Musculoskeletal:         General: Normal range of motion.      Cervical back: Normal range of motion and neck supple.   Skin:     General: Skin is warm and dry.      Capillary Refill: Capillary refill takes less than 2 seconds.   Neurological:      General: No focal deficit present.      Mental Status: He is alert and oriented to person, place, and time. Mental status is at baseline.   Psychiatric:         Mood and Affect: Mood normal.         Assessment & Plan   Diagnoses and all orders for this visit:    1. Essential hypertension (Primary)  -     CBC & Differential  -     Comprehensive metabolic panel  -     Lipid Panel With / Chol / HDL Ratio  -     Hemoglobin A1c  -     MicroAlbumin, Urine, Random - Urine, Clean Catch  -     PSA Screen    2. Mixed hyperlipidemia  -     CBC & Differential  -     Comprehensive metabolic panel  -     Lipid Panel With / Chol / HDL Ratio  -     Hemoglobin A1c  -     MicroAlbumin, Urine, Random - Urine, Clean Catch  -     PSA Screen  -     Sedimentation Rate    3. Coronary artery disease involving native heart, unspecified vessel or lesion type, unspecified whether angina present  -     CBC & Differential  -     Comprehensive metabolic panel  -     Lipid Panel With / Chol / HDL Ratio  -     Hemoglobin A1c  -     MicroAlbumin, Urine, Random - Urine, Clean Catch  -     PSA Screen    4. Type 2 diabetes mellitus without complication, without long-term current use of insulin (HCC)  -     CBC &  Differential  -     Comprehensive metabolic panel  -     Lipid Panel With / Chol / HDL Ratio  -     Hemoglobin A1c  -     MicroAlbumin, Urine, Random - Urine, Clean Catch  -     PSA Screen    5. Screening for malignant neoplasm of prostate  -     PSA Screen    6. Tobacco use    7. Acute midline low back pain without sciatica    Other orders  -     methylPREDNISolone (MEDROL) 4 MG dose pack; Take as directed on package instructions.  Dispense: 21 tablet; Refill: 0  -     orphenadrine (NORFLEX) 100 MG 12 hr tablet; Take 1 tablet by mouth 2 (Two) Times a Day.  Dispense: 30 tablet; Refill: 0    he will monitro bp and bs and keep me infome  He willl keep apt with cardiology      Ashley Barrientosne Mirna  reports that he has quit smoking. His smoking use included cigarettes. He has a 10.00 pack-year smoking history. His smokeless tobacco use includes snuff.. I have educated him on the risk of diseases from using tobacco products such as cancer, COPD and heart disease.     I advised him to quit and he t willing to quit.    I spent 5 minutes counseling the patient.  Let me kmnow if back not better by next week          Orders Placed This Encounter   Procedures   • Comprehensive metabolic panel     Order Specific Question:   Release to patient     Answer:   Routine Release   • Lipid Panel With / Chol / HDL Ratio     Order Specific Question:   Release to patient     Answer:   Routine Release   • Hemoglobin A1c     Order Specific Question:   Release to patient     Answer:   Routine Release   • MicroAlbumin, Urine, Random - Urine, Clean Catch     Order Specific Question:   Release to patient     Answer:   Routine Release   • PSA Screen     Order Specific Question:   Release to patient     Answer:   Routine Release   • Sedimentation Rate     Order Specific Question:   Release to patient     Answer:   Routine Release   • CBC & Differential       Follow up: 6 month(s)

## 2023-03-08 LAB
ALBUMIN SERPL-MCNC: 5 G/DL (ref 3.5–5.2)
ALBUMIN/GLOB SERPL: 2.2 G/DL
ALP SERPL-CCNC: 123 U/L (ref 39–117)
ALT SERPL-CCNC: 37 U/L (ref 1–41)
AST SERPL-CCNC: 26 U/L (ref 1–40)
BASOPHILS # BLD AUTO: 0.1 10*3/MM3 (ref 0–0.2)
BASOPHILS NFR BLD AUTO: 1.2 % (ref 0–1.5)
BILIRUB SERPL-MCNC: 0.6 MG/DL (ref 0–1.2)
BUN SERPL-MCNC: 9 MG/DL (ref 8–23)
BUN/CREAT SERPL: 8.6 (ref 7–25)
CALCIUM SERPL-MCNC: 9.9 MG/DL (ref 8.6–10.5)
CHLORIDE SERPL-SCNC: 101 MMOL/L (ref 98–107)
CHOLEST SERPL-MCNC: 118 MG/DL (ref 0–200)
CHOLEST/HDLC SERPL: 3.81 {RATIO}
CO2 SERPL-SCNC: 27.5 MMOL/L (ref 22–29)
CREAT SERPL-MCNC: 1.05 MG/DL (ref 0.76–1.27)
EGFRCR SERPLBLD CKD-EPI 2021: 79.8 ML/MIN/1.73
EOSINOPHIL # BLD AUTO: 0.13 10*3/MM3 (ref 0–0.4)
EOSINOPHIL NFR BLD AUTO: 1.6 % (ref 0.3–6.2)
ERYTHROCYTE [DISTWIDTH] IN BLOOD BY AUTOMATED COUNT: 11.7 % (ref 12.3–15.4)
ERYTHROCYTE [SEDIMENTATION RATE] IN BLOOD BY WESTERGREN METHOD: 5 MM/HR (ref 0–20)
GLOBULIN SER CALC-MCNC: 2.3 GM/DL
GLUCOSE SERPL-MCNC: 136 MG/DL (ref 65–99)
HBA1C MFR BLD: 7.3 % (ref 4.8–5.6)
HCT VFR BLD AUTO: 45.9 % (ref 37.5–51)
HDLC SERPL-MCNC: 31 MG/DL (ref 40–60)
HGB BLD-MCNC: 15.5 G/DL (ref 13–17.7)
IMM GRANULOCYTES # BLD AUTO: 0.03 10*3/MM3 (ref 0–0.05)
IMM GRANULOCYTES NFR BLD AUTO: 0.4 % (ref 0–0.5)
LDLC SERPL CALC-MCNC: 62 MG/DL (ref 0–100)
LYMPHOCYTES # BLD AUTO: 2.33 10*3/MM3 (ref 0.7–3.1)
LYMPHOCYTES NFR BLD AUTO: 28.8 % (ref 19.6–45.3)
MCH RBC QN AUTO: 32.5 PG (ref 26.6–33)
MCHC RBC AUTO-ENTMCNC: 33.8 G/DL (ref 31.5–35.7)
MCV RBC AUTO: 96.2 FL (ref 79–97)
MICROALBUMIN UR-MCNC: 214.1 UG/ML
MONOCYTES # BLD AUTO: 0.62 10*3/MM3 (ref 0.1–0.9)
MONOCYTES NFR BLD AUTO: 7.7 % (ref 5–12)
NEUTROPHILS # BLD AUTO: 4.88 10*3/MM3 (ref 1.7–7)
NEUTROPHILS NFR BLD AUTO: 60.3 % (ref 42.7–76)
NRBC BLD AUTO-RTO: 0 /100 WBC (ref 0–0.2)
PLATELET # BLD AUTO: 218 10*3/MM3 (ref 140–450)
POTASSIUM SERPL-SCNC: 4.8 MMOL/L (ref 3.5–5.2)
PROT SERPL-MCNC: 7.3 G/DL (ref 6–8.5)
PSA SERPL-MCNC: 1.8 NG/ML (ref 0–4)
RBC # BLD AUTO: 4.77 10*6/MM3 (ref 4.14–5.8)
SODIUM SERPL-SCNC: 140 MMOL/L (ref 136–145)
TRIGL SERPL-MCNC: 144 MG/DL (ref 0–150)
VLDLC SERPL CALC-MCNC: 25 MG/DL (ref 5–40)
WBC # BLD AUTO: 8.09 10*3/MM3 (ref 3.4–10.8)

## 2023-04-20 DIAGNOSIS — M79.2 NEURALGIA AND NEURITIS, UNSPECIFIED: ICD-10-CM

## 2023-04-21 RX ORDER — GABAPENTIN 100 MG/1
CAPSULE ORAL
Qty: 90 CAPSULE | Refills: 1 | Status: SHIPPED | OUTPATIENT
Start: 2023-04-21

## 2023-06-01 RX ORDER — METFORMIN HYDROCHLORIDE 500 MG/1
TABLET, EXTENDED RELEASE ORAL
Qty: 180 TABLET | Refills: 1 | Status: SHIPPED | OUTPATIENT
Start: 2023-06-01

## 2023-06-01 RX ORDER — ORPHENADRINE CITRATE 100 MG/1
TABLET, EXTENDED RELEASE ORAL
Qty: 30 TABLET | Refills: 0 | Status: SHIPPED | OUTPATIENT
Start: 2023-06-01

## 2023-06-01 RX ORDER — OMEPRAZOLE 40 MG/1
CAPSULE, DELAYED RELEASE ORAL
Qty: 90 CAPSULE | Refills: 1 | Status: SHIPPED | OUTPATIENT
Start: 2023-06-01

## 2023-08-18 DIAGNOSIS — K21.9 GASTRO-ESOPHAGEAL REFLUX DISEASE WITHOUT ESOPHAGITIS: ICD-10-CM

## 2023-08-18 RX ORDER — LISINOPRIL 10 MG/1
TABLET ORAL
Qty: 90 TABLET | Refills: 3 | Status: SHIPPED | OUTPATIENT
Start: 2023-08-18

## 2023-08-18 RX ORDER — DICLOFENAC SODIUM 75 MG/1
TABLET, DELAYED RELEASE ORAL
Qty: 180 TABLET | Refills: 0 | Status: SHIPPED | OUTPATIENT
Start: 2023-08-18

## 2023-08-18 RX ORDER — ATORVASTATIN CALCIUM 40 MG/1
TABLET, FILM COATED ORAL
Qty: 90 TABLET | Refills: 3 | Status: SHIPPED | OUTPATIENT
Start: 2023-08-18

## 2023-08-18 RX ORDER — LANSOPRAZOLE 30 MG/1
CAPSULE, DELAYED RELEASE ORAL
Qty: 90 CAPSULE | Refills: 1 | Status: SHIPPED | OUTPATIENT
Start: 2023-08-18

## 2024-01-02 DIAGNOSIS — M79.2 NEURALGIA AND NEURITIS, UNSPECIFIED: ICD-10-CM

## 2024-01-02 DIAGNOSIS — K21.9 GASTRO-ESOPHAGEAL REFLUX DISEASE WITHOUT ESOPHAGITIS: ICD-10-CM

## 2024-01-02 RX ORDER — DICLOFENAC SODIUM 75 MG/1
TABLET, DELAYED RELEASE ORAL
Qty: 60 TABLET | Refills: 0 | Status: SHIPPED | OUTPATIENT
Start: 2024-01-02

## 2024-01-02 RX ORDER — LANSOPRAZOLE 30 MG/1
CAPSULE, DELAYED RELEASE ORAL
Qty: 30 CAPSULE | Refills: 0 | Status: SHIPPED | OUTPATIENT
Start: 2024-01-02

## 2024-01-02 RX ORDER — METFORMIN HYDROCHLORIDE 500 MG/1
TABLET, EXTENDED RELEASE ORAL
Qty: 60 TABLET | Refills: 0 | Status: SHIPPED | OUTPATIENT
Start: 2024-01-02

## 2024-01-02 RX ORDER — GABAPENTIN 100 MG/1
CAPSULE ORAL
Qty: 90 CAPSULE | Refills: 0 | Status: SHIPPED | OUTPATIENT
Start: 2024-01-02

## 2024-02-28 DIAGNOSIS — K21.9 GASTRO-ESOPHAGEAL REFLUX DISEASE WITHOUT ESOPHAGITIS: ICD-10-CM

## 2024-02-28 RX ORDER — METFORMIN HYDROCHLORIDE 500 MG/1
1000 TABLET, EXTENDED RELEASE ORAL
Qty: 60 TABLET | Refills: 0 | Status: SHIPPED | OUTPATIENT
Start: 2024-02-28

## 2024-02-28 RX ORDER — LANSOPRAZOLE 30 MG/1
30 CAPSULE, DELAYED RELEASE ORAL DAILY
Qty: 30 CAPSULE | Refills: 0 | Status: SHIPPED | OUTPATIENT
Start: 2024-02-28

## 2024-04-01 RX ORDER — OMEPRAZOLE 40 MG/1
40 CAPSULE, DELAYED RELEASE ORAL DAILY
Qty: 90 CAPSULE | Refills: 0 | Status: SHIPPED | OUTPATIENT
Start: 2024-04-01

## 2024-04-10 RX ORDER — METFORMIN HYDROCHLORIDE 500 MG/1
1000 TABLET, EXTENDED RELEASE ORAL
Qty: 14 TABLET | Refills: 0 | Status: SHIPPED | OUTPATIENT
Start: 2024-04-10

## 2024-05-22 DIAGNOSIS — M79.2 NEURALGIA AND NEURITIS, UNSPECIFIED: ICD-10-CM

## 2024-05-22 RX ORDER — GABAPENTIN 100 MG/1
CAPSULE ORAL
Qty: 90 CAPSULE | Refills: 0 | Status: SHIPPED | OUTPATIENT
Start: 2024-05-22

## 2024-05-23 ENCOUNTER — TELEPHONE (OUTPATIENT)
Dept: FAMILY MEDICINE CLINIC | Facility: CLINIC | Age: 64
End: 2024-05-23
Payer: COMMERCIAL

## 2024-06-07 DIAGNOSIS — K21.9 GASTRO-ESOPHAGEAL REFLUX DISEASE WITHOUT ESOPHAGITIS: ICD-10-CM

## 2024-06-07 RX ORDER — LANSOPRAZOLE 30 MG/1
30 CAPSULE, DELAYED RELEASE ORAL DAILY
Qty: 30 CAPSULE | Refills: 0 | OUTPATIENT
Start: 2024-06-07

## 2024-06-07 RX ORDER — METFORMIN HYDROCHLORIDE 500 MG/1
1000 TABLET, EXTENDED RELEASE ORAL
Qty: 60 TABLET | OUTPATIENT
Start: 2024-06-07

## 2024-06-10 ENCOUNTER — OFFICE VISIT (OUTPATIENT)
Dept: FAMILY MEDICINE CLINIC | Facility: CLINIC | Age: 64
End: 2024-06-10
Payer: MEDICARE

## 2024-06-10 VITALS
SYSTOLIC BLOOD PRESSURE: 124 MMHG | TEMPERATURE: 97.7 F | HEART RATE: 86 BPM | DIASTOLIC BLOOD PRESSURE: 86 MMHG | BODY MASS INDEX: 29.53 KG/M2 | WEIGHT: 218 LBS | RESPIRATION RATE: 18 BRPM | HEIGHT: 72 IN | OXYGEN SATURATION: 97 %

## 2024-06-10 DIAGNOSIS — I10 ESSENTIAL HYPERTENSION: ICD-10-CM

## 2024-06-10 DIAGNOSIS — K21.9 GASTRO-ESOPHAGEAL REFLUX DISEASE WITHOUT ESOPHAGITIS: ICD-10-CM

## 2024-06-10 DIAGNOSIS — Z23 NEED FOR STREPTOCOCCUS PNEUMONIAE VACCINATION: ICD-10-CM

## 2024-06-10 DIAGNOSIS — E11.9 TYPE 2 DIABETES MELLITUS WITHOUT COMPLICATION, WITHOUT LONG-TERM CURRENT USE OF INSULIN: ICD-10-CM

## 2024-06-10 DIAGNOSIS — N18.2 CHRONIC KIDNEY DISEASE, STAGE II (MILD): ICD-10-CM

## 2024-06-10 DIAGNOSIS — E78.2 MIXED HYPERLIPIDEMIA: ICD-10-CM

## 2024-06-10 DIAGNOSIS — Z12.5 SCREENING FOR PROSTATE CANCER: Primary | ICD-10-CM

## 2024-06-10 PROCEDURE — 3079F DIAST BP 80-89 MM HG: CPT | Performed by: NURSE PRACTITIONER

## 2024-06-10 PROCEDURE — 3074F SYST BP LT 130 MM HG: CPT | Performed by: NURSE PRACTITIONER

## 2024-06-10 PROCEDURE — 99214 OFFICE O/P EST MOD 30 MIN: CPT | Performed by: NURSE PRACTITIONER

## 2024-06-10 PROCEDURE — 90677 PCV20 VACCINE IM: CPT | Performed by: NURSE PRACTITIONER

## 2024-06-10 PROCEDURE — G0009 ADMIN PNEUMOCOCCAL VACCINE: HCPCS | Performed by: NURSE PRACTITIONER

## 2024-06-10 PROCEDURE — 1126F AMNT PAIN NOTED NONE PRSNT: CPT | Performed by: NURSE PRACTITIONER

## 2024-06-10 RX ORDER — CLOPIDOGREL BISULFATE 75 MG/1
1 TABLET ORAL DAILY
COMMUNITY
Start: 2024-03-18

## 2024-06-10 NOTE — PROGRESS NOTES
Subjective   Chief Complaint:  Medication checkup    History of Present Illness  The patient is a 64-year-old male presenting for regular medication checkup.    The patient has a documented history of type 2 diabetes, managed with metformin. He is due for laboratory tests.    The patient has a medical history of hyperlipidemia, for which he is currently on statin therapy. He reports no reported intolerance.    History of hypertension-no chest pain palpitations headaches-compliant with meds-due for labs.    Review of Systems    Past Medical, Surgical, Social, and Family History:  Allergies   Allergen Reactions    Penicillamine Unknown - High Severity    Penicillins       Current Outpatient Medications on File Prior to Visit   Medication Sig    aspirin 81 MG EC tablet Take 1 tablet by mouth Daily.    atorvastatin (LIPITOR) 40 MG tablet TAKE 1 TABLET BY MOUTH EVERY DAY    clopidogrel (PLAVIX) 75 MG tablet Take 1 tablet by mouth Daily.    diclofenac (VOLTAREN) 75 MG EC tablet TAKE 1 TABLET BY MOUTH TWICE A DAY    gabapentin (NEURONTIN) 100 MG capsule TAKE 1 CAPSULE BY MOUTH THREE TIMES A DAY    lansoprazole (PREVACID) 30 MG capsule Take 1 capsule by mouth Daily.    lisinopril (PRINIVIL,ZESTRIL) 10 MG tablet TAKE 1 TABLET BY MOUTH EVERY DAY    metFORMIN ER (GLUCOPHAGE-XR) 500 MG 24 hr tablet Take 2 tablets by mouth Daily With Breakfast.    metoprolol succinate XL (TOPROL-XL) 25 MG 24 hr tablet Take 1 tablet by mouth Daily.    omeprazole (priLOSEC) 40 MG capsule Take 1 capsule by mouth Daily. Please contact office for an appointment for further refills    Rybelsus 7 MG tablet TAKE 1 TABLET BY MOUTH EVERY DAY    [DISCONTINUED] methylPREDNISolone (MEDROL) 4 MG dose pack Take as directed on package instructions. (Patient not taking: Reported on 6/10/2024)    [DISCONTINUED] nitroglycerin (NITROSTAT) 0.4 MG SL tablet 1 under the tongue as needed for angina, may repeat q5mins for up three doses (Patient not taking: Reported on  6/10/2024)    [DISCONTINUED] orphenadrine (NORFLEX) 100 MG 12 hr tablet TAKE 1 TABLET BY MOUTH TWICE A DAY (Patient not taking: Reported on 6/10/2024)    [DISCONTINUED] ticagrelor (BRILINTA) 90 MG tablet tablet Take 1 tablet by mouth 2 (Two) Times a Day. (Patient not taking: Reported on 6/10/2024)     No current facility-administered medications on file prior to visit.      Past Medical History:   Diagnosis Date    Coronary artery disease     Diabetes mellitus     Diverticula of colon     GERD (gastroesophageal reflux disease)     Hyperlipidemia     Hypertension      Past Surgical History:   Procedure Laterality Date    CARDIAC CATHETERIZATION      CARDIAC CATHETERIZATION Right 2022    Procedure: Left Heart  WITH POSS INTERVENTION, RIGHT RADIAL, US GUIDED;  Surgeon: Earl Flood DO;  Location:  [facility];  Service: Cardiovascular;  Laterality: Right;    CARDIAC CATHETERIZATION N/A 2022    Procedure: Percutaneous Coronary Intervention;  Surgeon: Earl Flood DO;  Location:  [facility];  Service: Cardiovascular;  Laterality: N/A;    COLONOSCOPY  2010    snare 70, 60, rectum tics recall 5 years    COLONOSCOPY Left 2016    2 Tubular adenomas cecum and ascending colon, tics repeat exam in 5 years    HEMORRHOIDECTOMY      HERNIA REPAIR      TONSILLECTOMY       Social History     Socioeconomic History    Marital status: Single   Tobacco Use    Smoking status: Former     Current packs/day: 0.00     Average packs/day: 0.3 packs/day for 28.0 years (7.0 ttl pk-yrs)     Types: Cigarettes     Start date:      Quit date:      Years since quittin.4     Passive exposure: Past    Smokeless tobacco: Current     Types: Snuff   Vaping Use    Vaping status: Never Used   Substance and Sexual Activity    Alcohol use: No    Drug use: No    Sexual activity: Defer     Family History   Problem Relation Age of Onset    Ulcerative colitis Mother  "    Valvular heart disease Mother     Heart murmur Father     Colon cancer Neg Hx     Colon polyps Neg Hx        Prior Visit Notes/Records, Lab, Imaging, and Diagnostic Results Reviewed:  A1C:  Lab Results - Last 18 Months   Lab Units 03/07/23  1050   HEMOGLOBIN A1C % 7.30*     GLUCOSE:  Lab Results - Last 18 Months   Lab Units 03/07/23  1050   GLUCOSE mg/dL 136*     LIPID:  Lab Results - Last 18 Months   Lab Units 03/07/23  1050   CHOLESTEROL mg/dL 118   LDL CHOL mg/dL 62   HDL CHOL mg/dL 31*   TRIGLYCERIDES mg/dL 144     PSA:  Lab Results - Last 18 Months   Lab Units 03/07/23  1050   PSA ng/mL 1.800     CBC:  Lab Results - Last 18 Months   Lab Units 03/07/23  1050   WBC 10*3/mm3 8.09   HEMOGLOBIN g/dL 15.5   HEMATOCRIT % 45.9   PLATELETS 10*3/mm3 218      BMP/CMP:  Lab Results - Last 18 Months   Lab Units 03/07/23  1050   SODIUM mmol/L 140   POTASSIUM mmol/L 4.8   CHLORIDE mmol/L 101   CO2 mmol/L 27.5   GLUCOSE mg/dL 136*   BUN mg/dL 9   CREATININE mg/dL 1.05   EGFR RESULT mL/min/1.73 79.8   CALCIUM mg/dL 9.9     HEPATIC:  Lab Results - Last 18 Months   Lab Units 03/07/23  1050   ALT (SGPT) U/L 37   AST (SGOT) U/L 26   ALK PHOS U/L 123*     Vit D:No results for input(s): \"PQNU32QK\" in the last 72910 hours.  THYROID:No results for input(s): \"TSH\", \"FREET4\", \"FTI\" in the last 94225 hours.    Invalid input(s): \"FREET3\", \"T3\", \"T4\", \"TEUP\", \"TOTALT4\"  BMI Trend:  BMI Readings from Last 10 Encounters:   06/10/24 29.56 kg/m²   03/07/23 31.86 kg/m²   10/13/22 29.57 kg/m²   09/08/22 29.57 kg/m²   09/06/22 29.83 kg/m²   08/05/22 30.05 kg/m²   08/03/22 29.84 kg/m²   07/29/22 30.38 kg/m²   07/22/22 30.38 kg/m²   07/14/22 29.56 kg/m²     Objective   Vital Signs  /86 (BP Location: Left arm, Patient Position: Sitting, Cuff Size: Large Adult)   Pulse 86   Temp 97.7 °F (36.5 °C) (Infrared)   Resp 18   Ht 182.9 cm (72.01\")   Wt 98.9 kg (218 lb)   SpO2 97%   BMI 29.56 kg/m²   Physical Exam  Constitutional:       " General: He is not in acute distress.  Neck:      Vascular: No carotid bruit.   Cardiovascular:      Rate and Rhythm: Normal rate and regular rhythm.      Pulses: Normal pulses.           Dorsalis pedis pulses are 2+ on the right side and 2+ on the left side.        Posterior tibial pulses are 2+ on the right side and 2+ on the left side.      Heart sounds: No murmur heard.     No friction rub. No gallop.   Pulmonary:      Effort: Pulmonary effort is normal. No respiratory distress.      Breath sounds: Normal breath sounds. No wheezing or rhonchi.   Musculoskeletal:      Right lower leg: No edema.      Left lower leg: No edema.   Neurological:      Mental Status: He is alert.       Assessment & Plan   Diagnoses and all orders for this visit:    1. Screening for prostate cancer (Primary)  -     PSA Screen    2. Mixed hyperlipidemia  -     Lipid Panel    3. Essential hypertension  -     CBC & Differential  -     Comprehensive Metabolic Panel  -     TSH    4. Chronic kidney disease, stage II (mild)    5. Type 2 diabetes mellitus without complication, without long-term current use of insulin  -     Hemoglobin A1c  -     Microalbumin / Creatinine Urine Ratio - Urine, Clean Catch    6. Need for Streptococcus pneumoniae vaccination  -     Pneumococcal Conjugate Vaccine 20-Valent (PCV20)    Discussions & Anticipatory Guidance:  Advised and educated plan of care.      The patient will Return in about 6 months (around 12/10/2024) for AWV (make sure 366 days from last).      [employer] Co- used for dictation.    Electronically signed by MICKEY Soto, 06/10/24, 2:35 PM CDT.

## 2024-06-11 ENCOUNTER — TELEPHONE (OUTPATIENT)
Dept: FAMILY MEDICINE CLINIC | Facility: CLINIC | Age: 64
End: 2024-06-11
Payer: MEDICARE

## 2024-06-11 LAB
ALBUMIN SERPL-MCNC: 4.6 G/DL (ref 3.5–5.2)
ALBUMIN/CREAT UR: 124 MG/G CREAT (ref 0–29)
ALBUMIN/GLOB SERPL: 2 G/DL
ALP SERPL-CCNC: 103 U/L (ref 39–117)
ALT SERPL-CCNC: 34 U/L (ref 1–41)
AST SERPL-CCNC: 26 U/L (ref 1–40)
BASOPHILS # BLD AUTO: 0.09 10*3/MM3 (ref 0–0.2)
BASOPHILS NFR BLD AUTO: 0.9 % (ref 0–1.5)
BILIRUB SERPL-MCNC: 0.5 MG/DL (ref 0–1.2)
BUN SERPL-MCNC: 11 MG/DL (ref 8–23)
BUN/CREAT SERPL: 10.8 (ref 7–25)
CALCIUM SERPL-MCNC: 9.9 MG/DL (ref 8.6–10.5)
CHLORIDE SERPL-SCNC: 100 MMOL/L (ref 98–107)
CHOLEST SERPL-MCNC: 135 MG/DL (ref 0–200)
CO2 SERPL-SCNC: 27.7 MMOL/L (ref 22–29)
CREAT SERPL-MCNC: 1.02 MG/DL (ref 0.76–1.27)
CREAT UR-MCNC: 203.1 MG/DL
EGFRCR SERPLBLD CKD-EPI 2021: 81.6 ML/MIN/1.73
EOSINOPHIL # BLD AUTO: 0.12 10*3/MM3 (ref 0–0.4)
EOSINOPHIL NFR BLD AUTO: 1.3 % (ref 0.3–6.2)
ERYTHROCYTE [DISTWIDTH] IN BLOOD BY AUTOMATED COUNT: 11.4 % (ref 12.3–15.4)
GLOBULIN SER CALC-MCNC: 2.3 GM/DL
GLUCOSE SERPL-MCNC: 110 MG/DL (ref 65–99)
HBA1C MFR BLD: 6.2 % (ref 4.8–5.6)
HCT VFR BLD AUTO: 45.9 % (ref 37.5–51)
HDLC SERPL-MCNC: 33 MG/DL (ref 40–60)
HGB BLD-MCNC: 15.5 G/DL (ref 13–17.7)
IMM GRANULOCYTES # BLD AUTO: 0.05 10*3/MM3 (ref 0–0.05)
IMM GRANULOCYTES NFR BLD AUTO: 0.5 % (ref 0–0.5)
LDLC SERPL CALC-MCNC: 74 MG/DL (ref 0–100)
LYMPHOCYTES # BLD AUTO: 2.1 10*3/MM3 (ref 0.7–3.1)
LYMPHOCYTES NFR BLD AUTO: 22.1 % (ref 19.6–45.3)
MCH RBC QN AUTO: 32.2 PG (ref 26.6–33)
MCHC RBC AUTO-ENTMCNC: 33.8 G/DL (ref 31.5–35.7)
MCV RBC AUTO: 95.4 FL (ref 79–97)
MICROALBUMIN UR-MCNC: 252.3 UG/ML
MONOCYTES # BLD AUTO: 0.54 10*3/MM3 (ref 0.1–0.9)
MONOCYTES NFR BLD AUTO: 5.7 % (ref 5–12)
NEUTROPHILS # BLD AUTO: 6.61 10*3/MM3 (ref 1.7–7)
NEUTROPHILS NFR BLD AUTO: 69.5 % (ref 42.7–76)
NRBC BLD AUTO-RTO: 0 /100 WBC (ref 0–0.2)
PLATELET # BLD AUTO: 228 10*3/MM3 (ref 140–450)
POTASSIUM SERPL-SCNC: 5.2 MMOL/L (ref 3.5–5.2)
PROT SERPL-MCNC: 6.9 G/DL (ref 6–8.5)
PSA SERPL-MCNC: 1.74 NG/ML (ref 0–4)
RBC # BLD AUTO: 4.81 10*6/MM3 (ref 4.14–5.8)
SODIUM SERPL-SCNC: 139 MMOL/L (ref 136–145)
TRIGL SERPL-MCNC: 160 MG/DL (ref 0–150)
TSH SERPL DL<=0.005 MIU/L-ACNC: 2.39 UIU/ML (ref 0.27–4.2)
VLDLC SERPL CALC-MCNC: 28 MG/DL (ref 5–40)
WBC # BLD AUTO: 9.51 10*3/MM3 (ref 3.4–10.8)

## 2024-06-11 RX ORDER — METFORMIN HYDROCHLORIDE 500 MG/1
1000 TABLET, EXTENDED RELEASE ORAL
Qty: 60 TABLET | Refills: 5 | Status: SHIPPED | OUTPATIENT
Start: 2024-06-11

## 2024-06-11 RX ORDER — LANSOPRAZOLE 30 MG/1
30 CAPSULE, DELAYED RELEASE ORAL DAILY
Qty: 30 CAPSULE | Refills: 0 | Status: SHIPPED | OUTPATIENT
Start: 2024-06-11

## 2024-06-11 NOTE — PROGRESS NOTES
Reviewed results - [employer] message sent.  If not seen in 3 days (3 day alert set), will send to pool to call the message.      Electronically signed by MICKEY Soto, 06/11/24, 8:28 AM CDT.

## 2024-06-11 NOTE — TELEPHONE ENCOUNTER
I would not recommend it.  They asked different ways but I do not believe that the Jardiance would be enough to manage his sugars alone.    Electronically signed by MICKEY Soto, 06/11/24, 10:05 AM CDT.

## 2024-06-11 NOTE — TELEPHONE ENCOUNTER
Patient seen his labs on mychart and is wanting to know if when he starts the jardiance can he stop the rybelsus

## 2024-07-08 DIAGNOSIS — K21.9 GASTRO-ESOPHAGEAL REFLUX DISEASE WITHOUT ESOPHAGITIS: ICD-10-CM

## 2024-07-08 RX ORDER — LANSOPRAZOLE 30 MG/1
30 CAPSULE, DELAYED RELEASE ORAL DAILY
Qty: 30 CAPSULE | Refills: 0 | Status: SHIPPED | OUTPATIENT
Start: 2024-07-08

## 2024-07-08 NOTE — TELEPHONE ENCOUNTER
Not on Clopidogrel (Plavix) or if on, refill is for Pantoprazole (Protonix)     Rx Refill Note  Requested Prescriptions     Pending Prescriptions Disp Refills    lansoprazole (PREVACID) 30 MG capsule [Pharmacy Med Name: LANSOPRAZOLE DR 30 MG CAPSULE] 30 capsule 0     Sig: TAKE 1 CAPSULE BY MOUTH EVERY DAY      Last office visit with office: 06/10/2024  Next office visit with office: 12/10/2024    UDS:     DATE OF LAST REFILL: 06/11/2024    Controlled Substance Agreement:     [place]:          {TIP  Is Refill Pharmacy correct?:  Lorri Sow MA  07/08/24, 07:32 CDT

## 2024-08-01 RX ORDER — ORAL SEMAGLUTIDE 7 MG/1
1 TABLET ORAL DAILY
Qty: 90 TABLET | Refills: 1 | Status: SHIPPED | OUTPATIENT
Start: 2024-08-01

## 2024-08-14 DIAGNOSIS — K21.9 GASTRO-ESOPHAGEAL REFLUX DISEASE WITHOUT ESOPHAGITIS: ICD-10-CM

## 2024-08-14 RX ORDER — LANSOPRAZOLE 30 MG/1
30 CAPSULE, DELAYED RELEASE ORAL DAILY
Qty: 30 CAPSULE | Refills: 0 | Status: SHIPPED | OUTPATIENT
Start: 2024-08-14

## 2024-08-14 NOTE — TELEPHONE ENCOUNTER
Rx Refill Note  Requested Prescriptions     Pending Prescriptions Disp Refills    lansoprazole (PREVACID) 30 MG capsule [Pharmacy Med Name: LANSOPRAZOLE DR 30 MG CAPSULE] 30 capsule 0     Sig: TAKE 1 CAPSULE BY MOUTH EVERY DAY      Last office visit with office: 06/10/24  Next office visit with office: 12/10/24    UDS:     DATE OF LAST REFILL: 07/08/24    Controlled Substance Agreement:     [place] OR [place]:          {TIP  Is Refill Pharmacy correct?:  Ivana Reich MA  08/14/24, 09:13 CDT

## 2024-09-14 DIAGNOSIS — K21.9 GASTRO-ESOPHAGEAL REFLUX DISEASE WITHOUT ESOPHAGITIS: ICD-10-CM

## 2024-09-16 RX ORDER — LANSOPRAZOLE 30 MG/1
30 CAPSULE, DELAYED RELEASE ORAL DAILY
Qty: 30 CAPSULE | Refills: 0 | Status: SHIPPED | OUTPATIENT
Start: 2024-09-16

## 2024-09-20 RX ORDER — LISINOPRIL 10 MG/1
10 TABLET ORAL DAILY
Qty: 90 TABLET | Refills: 3 | Status: SHIPPED | OUTPATIENT
Start: 2024-09-20

## 2024-09-25 RX ORDER — ATORVASTATIN CALCIUM 40 MG/1
40 TABLET, FILM COATED ORAL DAILY
Qty: 90 TABLET | Refills: 3 | Status: SHIPPED | OUTPATIENT
Start: 2024-09-25

## 2024-10-12 DIAGNOSIS — K21.9 GASTRO-ESOPHAGEAL REFLUX DISEASE WITHOUT ESOPHAGITIS: ICD-10-CM

## 2024-10-14 RX ORDER — LANSOPRAZOLE 30 MG/1
30 CAPSULE, DELAYED RELEASE ORAL DAILY
Qty: 30 CAPSULE | Refills: 0 | Status: SHIPPED | OUTPATIENT
Start: 2024-10-14

## 2024-10-14 NOTE — TELEPHONE ENCOUNTER
Rx Refill Note  Requested Prescriptions     Pending Prescriptions Disp Refills    lansoprazole (PREVACID) 30 MG capsule [Pharmacy Med Name: LANSOPRAZOLE DR 30 MG CAPSULE] 30 capsule 0     Sig: TAKE 1 CAPSULE BY MOUTH EVERY DAY      Last office visit with office: 06/10/24  Next office visit with office: 12/10/24    UDS:     DATE OF LAST REFILL: 09-16-24    Controlled Substance Agreement:     [place] OR [place]:          {TIP  Is Refill Pharmacy correct?:  Ivana Reich MA  10/14/24, 09:22 CDT

## 2024-10-30 ENCOUNTER — TELEPHONE (OUTPATIENT)
Dept: FAMILY MEDICINE CLINIC | Facility: CLINIC | Age: 64
End: 2024-10-30

## 2024-10-30 DIAGNOSIS — M79.2 NEURALGIA AND NEURITIS, UNSPECIFIED: ICD-10-CM

## 2024-10-30 RX ORDER — GABAPENTIN 100 MG/1
100 CAPSULE ORAL 3 TIMES DAILY
Qty: 90 CAPSULE | Refills: 0 | Status: SHIPPED | OUTPATIENT
Start: 2024-10-30

## 2024-10-30 RX ORDER — OMEPRAZOLE 40 MG/1
40 CAPSULE, DELAYED RELEASE ORAL DAILY
Qty: 90 CAPSULE | Refills: 1 | Status: SHIPPED | OUTPATIENT
Start: 2024-10-30

## 2024-10-30 NOTE — TELEPHONE ENCOUNTER
Rx Refill Note  Requested Prescriptions     Pending Prescriptions Disp Refills    gabapentin (NEURONTIN) 100 MG capsule 90 capsule 0     Sig: Take 1 capsule by mouth 3 (Three) Times a Day.      Last office visit with office: 06/10/2024  Next office visit with office: 12/10/2024    UDS: NONE    DATE OF LAST REFILL: 05/22/2024    Controlled Substance Agreement: not up to date    [place]         {TIP  Is Refill Pharmacy correct?:  Lorri Sow MA  10/30/24, 11:38 CDT

## 2024-10-30 NOTE — TELEPHONE ENCOUNTER
Caller: Ashley Bradley    Relationship: Self    Best call back number: [phone]     Requested Prescriptions:   Requested Prescriptions      No prescriptions requested or ordered in this encounter    OMEPRAZOLE 40 MG (NOT SHOWING IN CURRENT LIST)    Pharmacy where request should be sent: [facility]/PHARMACY #[id number] - [place], [place] - [address] - [phone] [facility] [phone] FX     Last office visit with prescribing clinician: 6/10/2024   Last telemedicine visit with prescribing clinician: Visit date not found   Next office visit with prescribing clinician: 12/10/2024     Additional details provided by patient:     Does the patient have less than a 3 day supply:  [x] Yes  [] No    Would you like a call back once the refill request has been completed: [] Yes [x] No    If the office needs to give you a call back, can they leave a voicemail: [] Yes [x] No    Anderson Varner III, RegSched Rep   10/30/24 11:37 CDT

## 2024-10-30 NOTE — TELEPHONE ENCOUNTER
Okay to fill but needs to stop the Nexium.    Electronically signed by MICKEY Soto, 10/30/24, 11:52 AM CDT.

## 2024-10-30 NOTE — TELEPHONE ENCOUNTER
Caller: Ashley Bradley    Relationship: Self    Best call back number: [phone]     Requested Prescriptions:   Requested Prescriptions     Pending Prescriptions Disp Refills    gabapentin (NEURONTIN) 100 MG capsule 90 capsule 0     Sig: Take 1 capsule by mouth 3 (Three) Times a Day.        Pharmacy where request should be sent: [facility]/PHARMACY #[id number] - [place], [place] - [address] - [phone] [facility] [phone]      Last office visit with prescribing clinician: 6/10/2024   Last telemedicine visit with prescribing clinician: Visit date not found   Next office visit with prescribing clinician: 12/10/2024     Additional details provided by patient: COMPLETELY OUT     Does the patient have less than a 3 day supply:  [x] Yes  [] No    Would you like a call back once the refill request has been completed: [] Yes [x] No    If the office needs to give you a call back, can they leave a voicemail: [] Yes [x] No    Anderson Varner III, Minh Rep   10/30/24 11:34 CDT

## 2024-10-30 NOTE — TELEPHONE ENCOUNTER
Called pt to let him know about CSA agreement. Pt states he will not be coming in soon to sign. Pt was made aware he will not receive his medication in the future without this agreement signed. Pt states he will wait until December.

## 2024-11-21 DIAGNOSIS — K21.9 GASTRO-ESOPHAGEAL REFLUX DISEASE WITHOUT ESOPHAGITIS: ICD-10-CM

## 2024-11-21 RX ORDER — LANSOPRAZOLE 30 MG/1
30 CAPSULE, DELAYED RELEASE ORAL DAILY
Qty: 30 CAPSULE | Refills: 0 | OUTPATIENT
Start: 2024-11-21

## 2024-12-10 ENCOUNTER — OFFICE VISIT (OUTPATIENT)
Dept: FAMILY MEDICINE CLINIC | Facility: CLINIC | Age: 64
End: 2024-12-10
Payer: MEDICARE

## 2024-12-10 VITALS
HEART RATE: 98 BPM | BODY MASS INDEX: 29.93 KG/M2 | SYSTOLIC BLOOD PRESSURE: 122 MMHG | DIASTOLIC BLOOD PRESSURE: 78 MMHG | OXYGEN SATURATION: 98 % | TEMPERATURE: 96.5 F | WEIGHT: 221 LBS | HEIGHT: 72 IN

## 2024-12-10 DIAGNOSIS — M79.2 NEURALGIA AND NEURITIS, UNSPECIFIED: Primary | ICD-10-CM

## 2024-12-10 PROCEDURE — 1159F MED LIST DOCD IN RCRD: CPT | Performed by: NURSE PRACTITIONER

## 2024-12-10 PROCEDURE — 3078F DIAST BP <80 MM HG: CPT | Performed by: NURSE PRACTITIONER

## 2024-12-10 PROCEDURE — 3044F HG A1C LEVEL LT 7.0%: CPT | Performed by: NURSE PRACTITIONER

## 2024-12-10 PROCEDURE — 1126F AMNT PAIN NOTED NONE PRSNT: CPT | Performed by: NURSE PRACTITIONER

## 2024-12-10 PROCEDURE — 3074F SYST BP LT 130 MM HG: CPT | Performed by: NURSE PRACTITIONER

## 2024-12-10 PROCEDURE — G0402 INITIAL PREVENTIVE EXAM: HCPCS | Performed by: NURSE PRACTITIONER

## 2024-12-10 PROCEDURE — 1160F RVW MEDS BY RX/DR IN RCRD: CPT | Performed by: NURSE PRACTITIONER

## 2024-12-10 PROCEDURE — 1170F FXNL STATUS ASSESSED: CPT | Performed by: NURSE PRACTITIONER

## 2024-12-10 NOTE — PATIENT INSTRUCTIONS
Advance Care Planning and Advance Directives     You make decisions on a daily basis - decisions about where you want to live, your career, your home, your life. Perhaps one of the most important decisions you face is your choice for future medical care. Take time to talk with your family and your healthcare team and start planning today.  Advance Care Planning is a process that can help you:  Understand possible future healthcare decisions in light of your own experiences  Reflect on those decision in light of your goals and values  Discuss your decisions with those closest to you and the healthcare professionals that care for you  Make a plan by creating a document that reflects your wishes    Surrogate Decision Maker  In the event of a medical emergency, which has left you unable to communicate or to make your own decisions, you would need someone to make decisions for you.  It is important to discuss your preferences for medical treatment with this person while you are in good health.     Qualities of a surrogate decision maker:  Willing to take on this role and responsibility  Knows what you want for future medical care  Willing to follow your wishes even if they don't agree with them  Able to make difficult medical decisions under stressful circumstances    Advance Directives  These are legal documents you can create that will guide your healthcare team and decision maker(s) when needed. These documents can be stored in the electronic medical record.    Living Will - a legal document to guide your care if you have a terminal condition or a serious illness and are unable to communicate. States vary by statute in document names/types, but most forms may include one or more of the following:        -  Directions regarding life-prolonging treatments        -  Directions regarding artificially provided nutrition/hydration        -  Choosing a healthcare decision maker        -  Direction regarding organ/tissue  donation    Durable Power of  for Healthcare - this document names an -in-fact to make medical decisions for you, but it may also allow this person to make personal and financial decisions for you. Please seek the advice of an  if you need this type of document.    **Advance Directives are not required and no one may discriminate against you if you do not sign one.    Medical Orders  Many states allow specific forms/orders signed by your physician to record your wishes for medical treatment in your current state of health. This form, signed in personal communication with your physician, addresses resuscitation and other medical interventions that you may or may not want.      For more information or to schedule a time with a [facility] Advance Care Planning Facilitator contact: [email or web address] or call [phone] and someone will contact you directly.

## 2024-12-10 NOTE — PROGRESS NOTES
Subjective   The ABCs of the Annual Wellness Visit  Medicare Wellness Visit-Welcome to Medicare      Mobile Thomas Bradley is a 64 y.o. patient who presents for a Medicare Wellness Visit.    The following portions of the patient's history were reviewed and   updated as appropriate: allergies, current medications, past family history, past medical history, past social history, past surgical history, and problem list.    Compared to one year ago, the patient's physical   health is the same.  Compared to one year ago, the patient's mental   health is the same.    Recent Hospitalizations:  He was not admitted to the hospital during the last year.     Current Medical Providers:  Patient Care Team:  Bk Teixeira APRN as PCP - General (Nurse Practitioner)  Lisandro Holt MD as Consulting Physician (Gastroenterology)    Outpatient Medications Prior to Visit   Medication Sig Dispense Refill    aspirin 81 MG EC tablet Take 1 tablet by mouth Daily. 90 tablet 1    atorvastatin (LIPITOR) 40 MG tablet Take 1 tablet by mouth Daily. 90 tablet 3    clopidogrel (PLAVIX) 75 MG tablet Take 1 tablet by mouth Daily.      diclofenac (VOLTAREN) 75 MG EC tablet TAKE 1 TABLET BY MOUTH TWICE A DAY 60 tablet 0    empagliflozin (Jardiance) 25 MG tablet tablet Take 1 tablet by mouth Daily. 30 tablet 5    gabapentin (NEURONTIN) 100 MG capsule Take 1 capsule by mouth 3 (Three) Times a Day. 90 capsule 0    lisinopril (PRINIVIL,ZESTRIL) 10 MG tablet Take 1 tablet by mouth Daily. 90 tablet 3    metFORMIN ER (GLUCOPHAGE-XR) 500 MG 24 hr tablet TAKE 2 TABLETS BY MOUTH DAILY WITH BREAKFAST. 60 tablet 5    metoprolol succinate XL (TOPROL-XL) 25 MG 24 hr tablet Take 1 tablet by mouth Daily. 30 tablet 11    omeprazole (priLOSEC) 40 MG capsule Take 1 capsule by mouth Daily. 90 capsule 1    Semaglutide (Rybelsus) 7 MG tablet Take 7 mg by mouth Daily. 90 tablet 1     No facility-administered medications prior to visit.     No opioid medication identified  "on active medication list. I have reviewed chart for other potential  high risk medication/s and harmful drug interactions in the elderly.      Aspirin is on active medication list. Aspirin use is indicated based on review of current medical condition/s. Pros and cons of this therapy have been discussed today. Benefits of this medication outweigh potential harm.  Patient has been encouraged to continue taking this medication.  .      Patient Active Problem List   Diagnosis    Essential hypertension    Chronic kidney disease, stage II (mild)    Mixed hyperlipidemia    Skin lesion    Wellness examination    Chronic midline low back pain without sciatica    Dry skin dermatitis    Type 2 diabetes mellitus without complication, without long-term current use of insulin    Irritant contact dermatitis due to plants, except food    Bronchitis    Acute sinusitis    Degeneration of intervertebral disc    Smoker    Heart murmur    Abnormal nuclear stress test    CAD (coronary artery disease)    Coronary artery disease involving native coronary artery of native heart with unstable angina pectoris    Syncope and collapse    GERD (gastroesophageal reflux disease)    Atherosclerosis of native coronary artery of native heart with angina pectoris    CKD (chronic kidney disease), stage II    Type 2 diabetes mellitus without complication, without long-term current use of insulin    Benign essential hypertension    HLD (hyperlipidemia)     Advance Care Planning Advance Directive is not on file.  ACP discussion was declined by the patient. Patient does not have an advance directive, declines further assistance.            Objective   Vitals:    12/10/24 1052   BP: 122/78   Pulse: 98   Temp: 96.5 °F (35.8 °C)   TempSrc: Infrared   SpO2: 98%   Weight: 100 kg (221 lb)   Height: 182.9 cm (72.01\")   PainSc: 0-No pain       Estimated body mass index is 29.96 kg/m² as calculated from the following:    Height as of this encounter: 182.9 cm " "(72.01\").    Weight as of this encounter: 100 kg (221 lb).           Gait and Balance Evaluation:  Normal  Does the patient have evidence of cognitive impairment? No                                                                                               Health  Risk Assessment    Smoking Status:  Social History     Tobacco Use   Smoking Status Former    Current packs/day: 0.00    Average packs/day: 0.3 packs/day for 28.0 years (7.0 ttl pk-yrs)    Types: Cigarettes    Start date:     Quit date:     Years since quittin.9    Passive exposure: Past   Smokeless Tobacco Current    Types: Snuff     Alcohol Consumption:  Social History     Substance and Sexual Activity   Alcohol Use No       Fall Risk Screen  STEADI Fall Risk Assessment was completed, and patient is at LOW risk for falls.Assessment completed on:12/10/2024    Depression Screening   Little interest or pleasure in doing things? Not at all   Feeling down, depressed, or hopeless? Not at all   PHQ-2 Total Score 0      Health Habits and Functional and Cognitive Screenin/10/2024    10:57 AM   Functional & Cognitive Status   Do you have difficulty preparing food and eating? No   Do you have difficulty bathing yourself, getting dressed or grooming yourself? No   Do you have difficulty using the toilet? No   Do you have difficulty moving around from place to place? No   Do you have trouble with steps or getting out of a bed or a chair? No   Current Diet Well Balanced Diet   Dental Exam Not up to date   Eye Exam Up to date   Exercise (times per week) 7 times per week   Current Exercises Include Walking   Do you need help using the phone?  No   Are you deaf or do you have serious difficulty hearing?  No   Do you need help to go to places out of walking distance? No   Do you need help shopping? No   Do you need help preparing meals?  No   Do you need help with housework?  No   Do you need help with laundry? No   Do you need help taking your " medications? No   Do you need help managing money? No   Do you ever drive or ride in a car without wearing a seat belt? No   Have you felt unusual stress, anger or loneliness in the last month? No   Who do you live with? Other   If you need help, do you have trouble finding someone available to you? No   Have you been bothered in the last four weeks by sexual problems? No   Do you have difficulty concentrating, remembering or making decisions? No   Visual Acuity:  Vision Screening - Comments:: Patient sees Ophthalmologist   Age-appropriate Screening Schedule:  Refer to the list below for future screening recommendations based on patient's age, sex and/or medical conditions. Orders for these recommended tests are listed in the plan section. The patient has been provided with a written plan.    Health Maintenance List  Health Maintenance   Topic Date Due    TDAP/TD VACCINES (1 - Tdap) Never done    ZOSTER VACCINE (1 of 2) Never done    DIABETIC EYE EXAM  07/13/2023    DIABETIC FOOT EXAM  09/06/2023    AAA SCREEN (ONE-TIME)  Never done    COVID-19 Vaccine (5 - 2024-25 season) 09/01/2024    HEMOGLOBIN A1C  12/10/2024    INFLUENZA VACCINE  03/31/2025 (Originally 7/1/2024)    LIPID PANEL  06/10/2025    BMI FOLLOWUP  08/01/2025    ANNUAL WELLNESS VISIT  12/10/2025    COLORECTAL CANCER SCREENING  11/03/2026    HEPATITIS C SCREENING  Completed    Pneumococcal Vaccine 65+  Completed    URINE MICROALBUMIN  Discontinued                                                                                                                                                CMS Preventative Services Quick Reference  Risk Factors Identified During Encounter  Immunizations Discussed/Encouraged: Influenza  Dental Screening Recommended  Going to get flu when feeling better and elsewhere d/t insurance    The above risks/problems have been discussed with the patient.  Pertinent information has been shared with the patient in the After Visit  Summary.  An After Visit Summary and PPPS were made available to the patient.    Follow Up:   Next Medicare Wellness visit to be scheduled in 1 year.     Assessment & Plan  Neuralgia and neuritis, unspecified    Orders:    COMPLIANCE DRUG ANALYSIS, NO THC - Urine, Clean Catch    Patient has diabetes, hyperlipidemia, and hypertension,-offered acute visit to do medication management-he reports he is doing well and wants to do labs and everything like that all the next visit.    Follow Up:   Return for 6 month CS medication management.

## 2024-12-11 RX ORDER — METFORMIN HYDROCHLORIDE 500 MG/1
1000 TABLET, EXTENDED RELEASE ORAL
Qty: 180 TABLET | Refills: 1 | Status: SHIPPED | OUTPATIENT
Start: 2024-12-11

## 2024-12-11 NOTE — TELEPHONE ENCOUNTER
Rx Refill Note  Requested Prescriptions     Pending Prescriptions Disp Refills    metFORMIN ER (GLUCOPHAGE-XR) 500 MG 24 hr tablet [Pharmacy Med Name: METFORMIN HCL  MG TABLET] 180 tablet 1     Sig: TAKE 2 TABLETS BY MOUTH DAILY WITH BREAKFAST.      Last office visit with prescribing clinician: 12/10/2024   Last telemedicine visit with prescribing clinician: Visit date not found   Next office visit with prescribing clinician: Visit date not found                         Would you like a call back once the refill request has been completed: [] Yes [x] No    If the office needs to give you a call back, can they leave a voicemail: [] Yes [x] No    Lorena Pearl MA  12/11/24, 07:03 CST

## 2024-12-18 LAB — DRUGS UR: NORMAL

## 2025-01-29 RX ORDER — EMPAGLIFLOZIN 25 MG/1
25 TABLET, FILM COATED ORAL DAILY
Qty: 30 TABLET | Refills: 5 | Status: SHIPPED | OUTPATIENT
Start: 2025-01-29

## 2025-04-11 ENCOUNTER — TELEPHONE (OUTPATIENT)
Dept: FAMILY MEDICINE CLINIC | Facility: CLINIC | Age: 65
End: 2025-04-11
Payer: MEDICARE

## 2025-04-11 DIAGNOSIS — E11.65 TYPE 2 DIABETES MELLITUS WITH HYPERGLYCEMIA, WITHOUT LONG-TERM CURRENT USE OF INSULIN: Primary | ICD-10-CM

## 2025-04-11 RX ORDER — LANCETS 28 GAUGE
EACH MISCELLANEOUS
Qty: 100 EACH | Refills: 2 | Status: SHIPPED | OUTPATIENT
Start: 2025-04-11

## 2025-04-11 NOTE — TELEPHONE ENCOUNTER
Patient called in stating his machine for GM has went out. He reports he was using the Verio brand but was not a fan. Is asking for a new machine with test strips and lancets to check sugars. He was seen last on 12/2024 and has an appointment on 04/16/2025

## 2025-04-11 NOTE — TELEPHONE ENCOUNTER
Orders are sent and I did do a new order with a diagnosis code for the kit.    Electronically signed by MICKEY Soto, 04/11/25, 12:26 PM CDT.

## 2025-04-11 NOTE — TELEPHONE ENCOUNTER
Caller: Ashley Bradley    Relationship: Self    Best call back number: [phone]     What is the best time to reach you: ANY    Who are you requesting to speak with (clinical staff, provider,  specific staff member): NONE SPECIFIED     What was the call regarding:     PATIENT STATES THE PHARMACY IS HAVING ISSUES REFILLING PATIENT'S DIABETES MONITORING KIT. PHARMACY STATES THERE IS ISSUE WITH THE DIAGNOSIS CODE ON THIS PRESCRIPTION. ALSO, PHARMACY HAS NOT RECEIVED PRESCRIPTIONS FOR TEST STRIPS OR THE LANCETS.     Is it okay if the provider responds through [facility]: PLEASE CALL PATIENT TO FOLLOW-UP ON THIS ISSUE.

## 2025-04-11 NOTE — TELEPHONE ENCOUNTER
Sounds good, I have got a new kit sent in to his pharmacy.    Electronically signed by MICKEY Soto, 04/11/25, 9:17 AM CDT.

## 2025-04-16 ENCOUNTER — OFFICE VISIT (OUTPATIENT)
Dept: FAMILY MEDICINE CLINIC | Facility: CLINIC | Age: 65
End: 2025-04-16
Payer: MEDICARE

## 2025-04-16 VITALS
WEIGHT: 220 LBS | DIASTOLIC BLOOD PRESSURE: 72 MMHG | HEART RATE: 77 BPM | HEIGHT: 72 IN | OXYGEN SATURATION: 96 % | BODY MASS INDEX: 29.8 KG/M2 | SYSTOLIC BLOOD PRESSURE: 118 MMHG

## 2025-04-16 DIAGNOSIS — I10 ESSENTIAL HYPERTENSION: ICD-10-CM

## 2025-04-16 DIAGNOSIS — E11.65 TYPE 2 DIABETES MELLITUS WITH HYPERGLYCEMIA, WITHOUT LONG-TERM CURRENT USE OF INSULIN: Primary | ICD-10-CM

## 2025-04-16 DIAGNOSIS — K21.9 GASTRO-ESOPHAGEAL REFLUX DISEASE WITHOUT ESOPHAGITIS: ICD-10-CM

## 2025-04-16 DIAGNOSIS — E78.2 MIXED HYPERLIPIDEMIA: ICD-10-CM

## 2025-04-17 LAB
ALBUMIN SERPL-MCNC: 4.8 G/DL (ref 3.5–5.2)
ALBUMIN/CREAT UR: 405 MG/G CREAT (ref 0–29)
ALBUMIN/GLOB SERPL: 1.7 G/DL
ALP SERPL-CCNC: 130 U/L (ref 39–117)
ALT SERPL-CCNC: 31 U/L (ref 1–41)
AST SERPL-CCNC: 29 U/L (ref 1–40)
BASOPHILS # BLD AUTO: 0.1 10*3/MM3 (ref 0–0.2)
BASOPHILS NFR BLD AUTO: 0.9 % (ref 0–1.5)
BILIRUB SERPL-MCNC: 0.5 MG/DL (ref 0–1.2)
BUN SERPL-MCNC: 14 MG/DL (ref 8–23)
BUN/CREAT SERPL: 14.6 (ref 7–25)
CALCIUM SERPL-MCNC: 10.5 MG/DL (ref 8.6–10.5)
CHLORIDE SERPL-SCNC: 98 MMOL/L (ref 98–107)
CHOLEST SERPL-MCNC: 165 MG/DL (ref 0–200)
CO2 SERPL-SCNC: 27.1 MMOL/L (ref 22–29)
CREAT SERPL-MCNC: 0.96 MG/DL (ref 0.76–1.27)
CREAT UR-MCNC: 58.3 MG/DL
EGFRCR SERPLBLD CKD-EPI 2021: 87.2 ML/MIN/1.73
EOSINOPHIL # BLD AUTO: 0.18 10*3/MM3 (ref 0–0.4)
EOSINOPHIL NFR BLD AUTO: 1.6 % (ref 0.3–6.2)
ERYTHROCYTE [DISTWIDTH] IN BLOOD BY AUTOMATED COUNT: 11.8 % (ref 12.3–15.4)
GLOBULIN SER CALC-MCNC: 2.9 GM/DL
GLUCOSE SERPL-MCNC: 95 MG/DL (ref 65–99)
HBA1C MFR BLD: 7.6 % (ref 4.8–5.6)
HCT VFR BLD AUTO: 45.1 % (ref 37.5–51)
HDLC SERPL-MCNC: 32 MG/DL (ref 40–60)
HGB BLD-MCNC: 15.4 G/DL (ref 13–17.7)
IMM GRANULOCYTES # BLD AUTO: 0.04 10*3/MM3 (ref 0–0.05)
IMM GRANULOCYTES NFR BLD AUTO: 0.4 % (ref 0–0.5)
LDLC SERPL CALC-MCNC: 78 MG/DL (ref 0–100)
LYMPHOCYTES # BLD AUTO: 2.96 10*3/MM3 (ref 0.7–3.1)
LYMPHOCYTES NFR BLD AUTO: 26.4 % (ref 19.6–45.3)
MCH RBC QN AUTO: 32.4 PG (ref 26.6–33)
MCHC RBC AUTO-ENTMCNC: 34.1 G/DL (ref 31.5–35.7)
MCV RBC AUTO: 94.9 FL (ref 79–97)
MICROALBUMIN UR-MCNC: 236.4 UG/ML
MONOCYTES # BLD AUTO: 0.75 10*3/MM3 (ref 0.1–0.9)
MONOCYTES NFR BLD AUTO: 6.7 % (ref 5–12)
NEUTROPHILS # BLD AUTO: 7.19 10*3/MM3 (ref 1.7–7)
NEUTROPHILS NFR BLD AUTO: 64 % (ref 42.7–76)
NRBC BLD AUTO-RTO: 0 /100 WBC (ref 0–0.2)
PLATELET # BLD AUTO: 238 10*3/MM3 (ref 140–450)
POTASSIUM SERPL-SCNC: 4.6 MMOL/L (ref 3.5–5.2)
PROT SERPL-MCNC: 7.7 G/DL (ref 6–8.5)
RBC # BLD AUTO: 4.75 10*6/MM3 (ref 4.14–5.8)
SODIUM SERPL-SCNC: 138 MMOL/L (ref 136–145)
TRIGL SERPL-MCNC: 342 MG/DL (ref 0–150)
TSH SERPL DL<=0.005 MIU/L-ACNC: 1.78 UIU/ML (ref 0.27–4.2)
VLDLC SERPL CALC-MCNC: 55 MG/DL (ref 5–40)
WBC # BLD AUTO: 11.22 10*3/MM3 (ref 3.4–10.8)

## 2025-04-18 ENCOUNTER — RESULTS FOLLOW-UP (OUTPATIENT)
Dept: FAMILY MEDICINE CLINIC | Facility: CLINIC | Age: 65
End: 2025-04-18
Payer: MEDICARE

## 2025-04-18 DIAGNOSIS — E11.65 TYPE 2 DIABETES MELLITUS WITH HYPERGLYCEMIA, WITHOUT LONG-TERM CURRENT USE OF INSULIN: Primary | ICD-10-CM

## 2025-04-18 RX ORDER — DULAGLUTIDE 0.75 MG/.5ML
0.75 INJECTION, SOLUTION SUBCUTANEOUS WEEKLY
Qty: 2 ML | Refills: 5 | Status: SHIPPED | OUTPATIENT
Start: 2025-04-18

## 2025-04-18 NOTE — PROGRESS NOTES
Reviewed results - Ampext message sent.  If not seen in 3 days (3 day alert set), will send to pool to call the message.      Electronically signed by MICKEY Soto, 04/18/25, 7:17 AM CDT.

## 2025-04-19 NOTE — PROGRESS NOTES
Subjective   Chief Complaint:  Case management    History of Present Illness  The patient is a 65-year-old male presenting today for medication management. He has a history of type 2 diabetes, GERD, hypertension, and mixed hyperlipidemia. He is on Prilosec, Toprol-XL, metformin, lisinopril, Jardiance, gabapentin, and atorvastatin. Additionally, he is on aspirin through cardiology.    Past Medical, Surgical, Social, and Family History:  Allergies   Allergen Reactions    Penicillamine Unknown - High Severity    Penicillins       Past Medical History:   Diagnosis Date    Chronic kidney disease     Colon polyp     Coronary artery disease     Diabetes mellitus     Diverticula of colon     GERD (gastroesophageal reflux disease)     Hernia 06/15/2017    Hyperlipidemia     Hypertension       Past Surgical History:   Procedure Laterality Date    CARDIAC CATHETERIZATION      CARDIAC CATHETERIZATION Right 05/23/2022    Procedure: Left Heart  WITH POSS INTERVENTION, RIGHT RADIAL, US GUIDED;  Surgeon: Earl Flood DO;  Location:  [facility];  Service: Cardiovascular;  Laterality: Right;    CARDIAC CATHETERIZATION N/A 05/24/2022    Procedure: Percutaneous Coronary Intervention;  Surgeon: Earl Flood DO;  Location:  [facility];  Service: Cardiovascular;  Laterality: N/A;    COLONOSCOPY  11/01/2010    snare 70, 60, rectum tics recall 5 years    COLONOSCOPY Left 11/03/2016    2 Tubular adenomas cecum and ascending colon, tics repeat exam in 5 years    HEMORRHOIDECTOMY      HERNIA REPAIR      TONSILLECTOMY        Social History     Socioeconomic History    Marital status: Single   Tobacco Use    Smoking status: Former     Current packs/day: 0.00     Average packs/day: 0.3 packs/day for 28.0 years (7.0 ttl pk-yrs)     Types: Cigarettes     Start date: 1994     Quit date: 2022     Years since quitting: 3.2     Passive exposure: Past    Smokeless tobacco: Current      "Types: Snuff   Vaping Use    Vaping status: Never Used   Substance and Sexual Activity    Alcohol use: No    Drug use: No    Sexual activity: Defer      Family History   Problem Relation Age of Onset    Ulcerative colitis Mother     Valvular heart disease Mother     Heart murmur Father     Colon cancer Neg Hx     Colon polyps Neg Hx        Objective   Vital Signs  /72   Pulse 77   Ht 182.9 cm (72.01\")   Wt 99.8 kg (220 lb)   SpO2 96%   BMI 29.83 kg/m²    Physical Exam  Constitutional:       General: He is not in acute distress.  Neck:      Vascular: No carotid bruit.   Cardiovascular:      Rate and Rhythm: Normal rate and regular rhythm.      Pulses: Normal pulses.           Dorsalis pedis pulses are 2+ on the right side and 2+ on the left side.        Posterior tibial pulses are 2+ on the right side and 2+ on the left side.      Heart sounds: No murmur heard.     No friction rub. No gallop.   Pulmonary:      Effort: Pulmonary effort is normal. No respiratory distress.      Breath sounds: Normal breath sounds. No wheezing or rhonchi.   Musculoskeletal:      Right lower leg: No edema.      Left lower leg: No edema.   Neurological:      Mental Status: He is alert.       Assessment & Plan   Assessment & Plan  1. Type 2 diabetes with hyperglycemia without long-term use of insulin.  He is on metformin and Jardiance. Continue the same medications. Labs are ordered including A1c and urine microalbumin.    2. Chronic GERD.  He is on Prilosec. Continue the same medication.    3. Essential hypertension.  He is on Toprol-XL and lisinopril. Continue the same medications.    4. Mixed hyperlipidemia.  He is on atorvastatin. Continue the same medication.    5. Health maintenance.  Labs are ordered including CBC, CMP, TSH, and lipids.    Follow-up:  The patient will Return for 6 month medication management.    Records and Results Reviewed:  I reviewed current medications as given by patient and allergy list.    Data " Entry: Hybrid ONE[employer]- and Dragon Speech Recognition - No recording technology was used in the exam room during encounter.    Electronically signed by MICKEY Soto, 04/19/25, 7:48 AM CDT.

## 2025-04-28 RX ORDER — OMEPRAZOLE 40 MG/1
40 CAPSULE, DELAYED RELEASE ORAL DAILY
Qty: 90 CAPSULE | Refills: 1 | Status: SHIPPED | OUTPATIENT
Start: 2025-04-28

## 2025-05-06 DIAGNOSIS — E11.65 TYPE 2 DIABETES MELLITUS WITH HYPERGLYCEMIA, WITHOUT LONG-TERM CURRENT USE OF INSULIN: ICD-10-CM

## 2025-05-06 RX ORDER — OMEPRAZOLE 40 MG/1
40 CAPSULE, DELAYED RELEASE ORAL DAILY
Qty: 90 CAPSULE | Refills: 1 | OUTPATIENT
Start: 2025-05-06

## 2025-05-06 NOTE — TELEPHONE ENCOUNTER
Everything is green to go but when I try to send it shows some meds that may contradict with one another.

## 2025-05-06 NOTE — TELEPHONE ENCOUNTER
[facility] [address] [phone] [place] [phone] FX    Relationship: Pharmacy    Best call back number: [phone]     Requested Prescriptions:   Requested Prescriptions     Pending Prescriptions Disp Refills    omeprazole (priLOSEC) 40 MG capsule 90 capsule 1     Sig: Take 1 capsule by mouth Daily.    empagliflozin (Jardiance) 25 MG tablet tablet 30 tablet 5     Sig: Take 1 tablet by mouth Daily.    atorvastatin (LIPITOR) 40 MG tablet 90 tablet 3     Sig: Take 1 tablet by mouth Daily.    metFORMIN ER (GLUCOPHAGE-XR) 500 MG 24 hr tablet 180 tablet 1     Sig: Take 2 tablets by mouth Daily With Breakfast.    glucose blood test strip 100 each 2     Sig: Use as instructed daily to check blood sugar    Lancets (freestyle) lancets 100 each 2     Sig: Use daily as needed to check blood sugar    lisinopril (PRINIVIL,ZESTRIL) 10 MG tablet 90 tablet 3     Sig: Take 1 tablet by mouth Daily.    diclofenac (VOLTAREN) 75 MG EC tablet 60 tablet 0     Sig: Take 1 tablet by mouth 2 (Two) Times a Day.    metoprolol succinate XL (TOPROL-XL) 25 MG 24 hr tablet 30 tablet 11     Sig: Take 1 tablet by mouth Daily.        [id number] [address] [place] [address] [phone] [place] [phone] FX     Last office visit with prescribing clinician: 4/16/2025   Last telemedicine visit with prescribing clinician: Visit date not found   Next office visit with prescribing clinician: Visit date not found     Additional details provided by patient:     Does the patient have less than a 3 day supply:  [x] Yes  [] No    Would you like a call back once the refill request has been completed: [x] Yes [] No    If the office needs to give you a call back, can they leave a voicemail: [] Yes [] No    Minh Baltazar Rep   05/06/25 15:31 CDT

## 2025-05-07 RX ORDER — LISINOPRIL 10 MG/1
10 TABLET ORAL DAILY
Qty: 90 TABLET | Refills: 3 | Status: SHIPPED | OUTPATIENT
Start: 2025-05-07

## 2025-05-07 RX ORDER — METFORMIN HYDROCHLORIDE 500 MG/1
1000 TABLET, EXTENDED RELEASE ORAL
Qty: 180 TABLET | Refills: 1 | Status: SHIPPED | OUTPATIENT
Start: 2025-05-07

## 2025-05-07 RX ORDER — LANCETS 28 GAUGE
EACH MISCELLANEOUS
Qty: 100 EACH | Refills: 2 | Status: SHIPPED | OUTPATIENT
Start: 2025-05-07

## 2025-05-07 RX ORDER — METOPROLOL SUCCINATE 25 MG/1
25 TABLET, EXTENDED RELEASE ORAL DAILY
Qty: 30 TABLET | Refills: 11 | Status: SHIPPED | OUTPATIENT
Start: 2025-05-07

## 2025-05-07 RX ORDER — ATORVASTATIN CALCIUM 40 MG/1
40 TABLET, FILM COATED ORAL DAILY
Qty: 90 TABLET | Refills: 3 | Status: SHIPPED | OUTPATIENT
Start: 2025-05-07

## 2025-05-07 RX ORDER — DICLOFENAC SODIUM 75 MG/1
75 TABLET, DELAYED RELEASE ORAL 2 TIMES DAILY
Qty: 60 TABLET | Refills: 0 | OUTPATIENT
Start: 2025-05-07

## 2025-05-07 NOTE — TELEPHONE ENCOUNTER
I am declining the Voltaren.  It does not mix well with aspirin.  This is a high bleeding risk.    Electronically signed by MICKEY Soto, 05/07/25, 7:22 AM CDT.

## 2025-06-09 RX ORDER — METFORMIN HYDROCHLORIDE 500 MG/1
1000 TABLET, EXTENDED RELEASE ORAL
Qty: 180 TABLET | Refills: 1 | Status: SHIPPED | OUTPATIENT
Start: 2025-06-09

## (undated) DEVICE — PAD, DEFIB, ADULT, RADIOTRANS, PHYSIO: Brand: MEDLINE

## (undated) DEVICE — SUP ARMBRD ART/LINE BLU

## (undated) DEVICE — PTCA DILATATION CATHETER: Brand: EMERGE™

## (undated) DEVICE — 6F .070 XB 3.5 100CM: Brand: VISTA BRITE TIP

## (undated) DEVICE — MODEL BT2000 P/N 700287-012KIT CONTENTS: MANIFOLD WITH SALINE AND CONTRAST PORTS, SALINE TUBING WITH SPIKE AND HAND SYRINGE, TRANSDUCER: Brand: BT2000 AUTOMATED MANIFOLD KIT

## (undated) DEVICE — GW STARTER FXD CORE J .035 3X260CM 3MM

## (undated) DEVICE — TREK CORONARY DILATATION CATHETER 2.50 MM X 8 MM / RAPID-EXCHANGE: Brand: TREK

## (undated) DEVICE — TR BAND RADIAL ARTERY COMPRESSION DEVICE: Brand: TR BAND

## (undated) DEVICE — CANN NASL ETCO2 LO/FLO A/

## (undated) DEVICE — PK CATH CARD 30 CA/4

## (undated) DEVICE — DRSNG SURESITE WNDW 4X4.5

## (undated) DEVICE — TREK CORONARY DILATATION CATHETER 2.25 MM X 8 MM / RAPID-EXCHANGE: Brand: TREK

## (undated) DEVICE — 6F .070 AR 1 SH 100CM: Brand: VISTA BRITE TIP

## (undated) DEVICE — GW STARTER FXD CORE STR .035 3X150CM

## (undated) DEVICE — TREK CORONARY DILATATION CATHETER 2.50 MM X 12 MM / RAPID-EXCHANGE: Brand: TREK

## (undated) DEVICE — DEV TORQ GW HOT/PINK

## (undated) DEVICE — SOL IRR NACL 0.9PCT BT 1000ML

## (undated) DEVICE — HI-TORQUE BALANCE MIDDLEWEIGHT UNIVERSAL II GUIDE WIRE STRAIGHT TIP PAK  190 CM: Brand: HI-TORQUE BALANCE MIDDLEWEIGHT UNIVERSAL II

## (undated) DEVICE — KT NDL GUIDE STRL 18GA

## (undated) DEVICE — 6F .070 JR 4 100CM: Brand: CORDIS

## (undated) DEVICE — 6F .070 AL. 1 SH 100CM: Brand: CORDIS

## (undated) DEVICE — GLIDESHEATH SLENDER STAINLESS STEEL KIT: Brand: GLIDESHEATH SLENDER

## (undated) DEVICE — NC TREK CORONARY DILATATION CATHETER 2.0 MM X 12 MM / RAPID-EXCHANGE: Brand: NC TREK

## (undated) DEVICE — TOOL INSRT GW MTL OR PLSTC

## (undated) DEVICE — SOLIDIFIER LIQUI LOC PLUS 2000CC

## (undated) DEVICE — TIBURON BRACHIAL ANGIOGRAPHY DRAPE: Brand: CONVERTORS

## (undated) DEVICE — HI-TORQUE WHISPER ES GUIDE WIRE .014 STRAIGHTTIP 3.0 CM X 190 CM: Brand: HI-TORQUE WHISPER

## (undated) DEVICE — CATH F6INF TL JR 4 100CM: Brand: INFINITI

## (undated) DEVICE — PINNACLE INTRODUCER SHEATH: Brand: PINNACLE

## (undated) DEVICE — CATH F6INF PIG 145 110CM 6SH: Brand: INFINITI

## (undated) DEVICE — 6F .070 JR 4 SH 100CM: Brand: VISTA BRITE TIP

## (undated) DEVICE — RADIFOCUS OPTITORQUE ANGIOGRAPHIC CATHETER: Brand: OPTITORQUE

## (undated) DEVICE — ANGIO-SEAL VIP VASCULAR CLOSURE DEVICE: Brand: ANGIO-SEAL

## (undated) DEVICE — 6F .070 AL.75 100CM: Brand: CORDIS

## (undated) DEVICE — COPILOT BLEEDBACK CONTROL VALVE: Brand: COPILOT

## (undated) DEVICE — TREK CORONARY DILATATION CATHETER 2.25 MM X 12 MM / RAPID-EXCHANGE: Brand: TREK

## (undated) DEVICE — NC TREK CORONARY DILATATION CATHETER 2.0 MM X 8 MM / RAPID-EXCHANGE: Brand: NC TREK

## (undated) DEVICE — GW STARTER FXD CORE J .035 3X150CM 3MM

## (undated) DEVICE — MODEL AT P65, P/N 701554-001KIT CONTENTS: HAND CONTROLLER, 3-WAY HIGH-PRESSURE STOPCOCK WITH ROTATING END AND PREMIUM HIGH-PRESSURE TUBING: Brand: ANGIOTOUCH® KIT

## (undated) DEVICE — MINI TREK CORONARY DILATATION CATHETER 2.0 MM X 15 MM / RAPID-EXCHANGE: Brand: MINI TREK

## (undated) DEVICE — INFLATION DEVICE: Brand: ENCORE™ 26

## (undated) DEVICE — GUIDE EXTENSION CATHETER: Brand: GUIDEZILLA™ II

## (undated) DEVICE — PERCLOSE™ PROSTYLE™ SUTURE-MEDIATED CLOSURE AND REPAIR SYSTEM: Brand: PERCLOSE™ PROSTYLE™

## (undated) DEVICE — 6F .070 JL4 100CM: Brand: CORDIS